# Patient Record
Sex: FEMALE | Race: WHITE | NOT HISPANIC OR LATINO | Employment: OTHER | URBAN - METROPOLITAN AREA
[De-identification: names, ages, dates, MRNs, and addresses within clinical notes are randomized per-mention and may not be internally consistent; named-entity substitution may affect disease eponyms.]

---

## 2018-01-01 ENCOUNTER — LAB REQUISITION (OUTPATIENT)
Dept: LAB | Facility: HOSPITAL | Age: 83
End: 2018-01-01
Payer: MEDICARE

## 2018-01-01 DIAGNOSIS — D48.9 NEOPLASM OF UNCERTAIN BEHAVIOR: ICD-10-CM

## 2018-01-01 PROCEDURE — 88305 TISSUE EXAM BY PATHOLOGIST: CPT | Performed by: PATHOLOGY

## 2018-04-30 ENCOUNTER — APPOINTMENT (EMERGENCY)
Dept: RADIOLOGY | Facility: HOSPITAL | Age: 83
DRG: 189 | End: 2018-04-30
Payer: MEDICARE

## 2018-04-30 ENCOUNTER — HOSPITAL ENCOUNTER (INPATIENT)
Facility: HOSPITAL | Age: 83
LOS: 8 days | Discharge: NURSING, REHAB OR LONG-TERM CARE | DRG: 189 | End: 2018-05-08
Attending: EMERGENCY MEDICINE | Admitting: FAMILY MEDICINE
Payer: MEDICARE

## 2018-04-30 DIAGNOSIS — R09.02 HYPOXIA: Primary | ICD-10-CM

## 2018-04-30 DIAGNOSIS — J45.909 ASTHMATIC BRONCHITIS: ICD-10-CM

## 2018-04-30 DIAGNOSIS — J98.01 ACUTE BRONCHOSPASM DUE TO VIRAL INFECTION: ICD-10-CM

## 2018-04-30 DIAGNOSIS — B34.9 ACUTE BRONCHOSPASM DUE TO VIRAL INFECTION: ICD-10-CM

## 2018-04-30 PROBLEM — I87.2 VENOUS INSUFFICIENCY OF BOTH LOWER EXTREMITIES: Status: ACTIVE | Noted: 2018-04-30

## 2018-04-30 PROBLEM — G25.81 RESTLESS LEG: Status: ACTIVE | Noted: 2018-04-30

## 2018-04-30 PROBLEM — M41.9 SCOLIOSIS: Status: ACTIVE | Noted: 2018-04-30

## 2018-04-30 PROBLEM — G25.0 BENIGN ESSENTIAL TREMOR: Status: ACTIVE | Noted: 2018-04-30

## 2018-04-30 PROBLEM — E03.9 HYPOTHYROID: Status: ACTIVE | Noted: 2018-04-30

## 2018-04-30 LAB
ALBUMIN SERPL BCP-MCNC: 3.2 G/DL (ref 3.5–5)
ALP SERPL-CCNC: 82 U/L (ref 46–116)
ALT SERPL W P-5'-P-CCNC: 31 U/L (ref 12–78)
ANION GAP SERPL CALCULATED.3IONS-SCNC: 3 MMOL/L (ref 4–13)
APTT PPP: 30 SECONDS (ref 23–35)
AST SERPL W P-5'-P-CCNC: 35 U/L (ref 5–45)
BASOPHILS # BLD AUTO: 0.03 THOUSANDS/ΜL (ref 0–0.1)
BASOPHILS NFR BLD AUTO: 0 % (ref 0–1)
BILIRUB DIRECT SERPL-MCNC: 0.04 MG/DL (ref 0–0.2)
BILIRUB SERPL-MCNC: 0.3 MG/DL (ref 0.2–1)
BUN SERPL-MCNC: 16 MG/DL (ref 5–25)
CALCIUM SERPL-MCNC: 8.7 MG/DL (ref 8.3–10.1)
CHLORIDE SERPL-SCNC: 96 MMOL/L (ref 100–108)
CO2 SERPL-SCNC: 35 MMOL/L (ref 21–32)
CREAT SERPL-MCNC: 0.47 MG/DL (ref 0.6–1.3)
EOSINOPHIL # BLD AUTO: 0.06 THOUSAND/ΜL (ref 0–0.61)
EOSINOPHIL NFR BLD AUTO: 1 % (ref 0–6)
ERYTHROCYTE [DISTWIDTH] IN BLOOD BY AUTOMATED COUNT: 14.2 % (ref 11.6–15.1)
GFR SERPL CREATININE-BSD FRML MDRD: 87 ML/MIN/1.73SQ M
GLUCOSE SERPL-MCNC: 116 MG/DL (ref 65–140)
HCT VFR BLD AUTO: 43.3 % (ref 34.8–46.1)
HGB BLD-MCNC: 13.6 G/DL (ref 11.5–15.4)
INR PPP: 0.94 (ref 0.86–1.16)
LYMPHOCYTES # BLD AUTO: 1.89 THOUSANDS/ΜL (ref 0.6–4.47)
LYMPHOCYTES NFR BLD AUTO: 22 % (ref 14–44)
MCH RBC QN AUTO: 29.9 PG (ref 26.8–34.3)
MCHC RBC AUTO-ENTMCNC: 31.4 G/DL (ref 31.4–37.4)
MCV RBC AUTO: 95 FL (ref 82–98)
MONOCYTES # BLD AUTO: 1.06 THOUSAND/ΜL (ref 0.17–1.22)
MONOCYTES NFR BLD AUTO: 13 % (ref 4–12)
NEUTROPHILS # BLD AUTO: 5.42 THOUSANDS/ΜL (ref 1.85–7.62)
NEUTS SEG NFR BLD AUTO: 64 % (ref 43–75)
NT-PROBNP SERPL-MCNC: 707 PG/ML
PLATELET # BLD AUTO: 268 THOUSANDS/UL (ref 149–390)
PMV BLD AUTO: 9.9 FL (ref 8.9–12.7)
POTASSIUM SERPL-SCNC: 4.1 MMOL/L (ref 3.5–5.3)
PROT SERPL-MCNC: 7.6 G/DL (ref 6.4–8.2)
PROTHROMBIN TIME: 12.8 SECONDS (ref 12.1–14.4)
RBC # BLD AUTO: 4.55 MILLION/UL (ref 3.81–5.12)
SODIUM SERPL-SCNC: 134 MMOL/L (ref 136–145)
TROPONIN I SERPL-MCNC: 0.04 NG/ML
WBC # BLD AUTO: 8.46 THOUSAND/UL (ref 4.31–10.16)

## 2018-04-30 PROCEDURE — 87205 SMEAR GRAM STAIN: CPT | Performed by: INTERNAL MEDICINE

## 2018-04-30 PROCEDURE — 94640 AIRWAY INHALATION TREATMENT: CPT

## 2018-04-30 PROCEDURE — 99285 EMERGENCY DEPT VISIT HI MDM: CPT

## 2018-04-30 PROCEDURE — 93005 ELECTROCARDIOGRAM TRACING: CPT

## 2018-04-30 PROCEDURE — 85610 PROTHROMBIN TIME: CPT | Performed by: EMERGENCY MEDICINE

## 2018-04-30 PROCEDURE — 87070 CULTURE OTHR SPECIMN AEROBIC: CPT | Performed by: INTERNAL MEDICINE

## 2018-04-30 PROCEDURE — 94664 DEMO&/EVAL PT USE INHALER: CPT

## 2018-04-30 PROCEDURE — 71046 X-RAY EXAM CHEST 2 VIEWS: CPT

## 2018-04-30 PROCEDURE — 94760 N-INVAS EAR/PLS OXIMETRY 1: CPT

## 2018-04-30 PROCEDURE — 80076 HEPATIC FUNCTION PANEL: CPT | Performed by: EMERGENCY MEDICINE

## 2018-04-30 PROCEDURE — 85730 THROMBOPLASTIN TIME PARTIAL: CPT | Performed by: EMERGENCY MEDICINE

## 2018-04-30 PROCEDURE — 85025 COMPLETE CBC W/AUTO DIFF WBC: CPT | Performed by: EMERGENCY MEDICINE

## 2018-04-30 PROCEDURE — 99223 1ST HOSP IP/OBS HIGH 75: CPT | Performed by: INTERNAL MEDICINE

## 2018-04-30 PROCEDURE — 84484 ASSAY OF TROPONIN QUANT: CPT | Performed by: EMERGENCY MEDICINE

## 2018-04-30 PROCEDURE — 87040 BLOOD CULTURE FOR BACTERIA: CPT | Performed by: EMERGENCY MEDICINE

## 2018-04-30 PROCEDURE — 80048 BASIC METABOLIC PNL TOTAL CA: CPT | Performed by: EMERGENCY MEDICINE

## 2018-04-30 PROCEDURE — 36415 COLL VENOUS BLD VENIPUNCTURE: CPT | Performed by: EMERGENCY MEDICINE

## 2018-04-30 PROCEDURE — 83880 ASSAY OF NATRIURETIC PEPTIDE: CPT | Performed by: EMERGENCY MEDICINE

## 2018-04-30 PROCEDURE — 96374 THER/PROPH/DIAG INJ IV PUSH: CPT

## 2018-04-30 RX ORDER — BENZONATATE 100 MG/1
100 CAPSULE ORAL 3 TIMES DAILY PRN
Status: DISCONTINUED | OUTPATIENT
Start: 2018-04-30 | End: 2018-05-02

## 2018-04-30 RX ORDER — METHYLPREDNISOLONE SODIUM SUCCINATE 125 MG/2ML
125 INJECTION, POWDER, LYOPHILIZED, FOR SOLUTION INTRAMUSCULAR; INTRAVENOUS ONCE
Status: COMPLETED | OUTPATIENT
Start: 2018-04-30 | End: 2018-04-30

## 2018-04-30 RX ORDER — TRAMADOL HYDROCHLORIDE 50 MG/1
50 TABLET ORAL 3 TIMES DAILY
COMMUNITY

## 2018-04-30 RX ORDER — LEVOTHYROXINE SODIUM 0.07 MG/1
75 TABLET ORAL
Status: DISCONTINUED | OUTPATIENT
Start: 2018-05-01 | End: 2018-05-08 | Stop reason: HOSPADM

## 2018-04-30 RX ORDER — DOCUSATE SODIUM 100 MG/1
100 CAPSULE, LIQUID FILLED ORAL 2 TIMES DAILY
Status: DISCONTINUED | OUTPATIENT
Start: 2018-04-30 | End: 2018-05-08 | Stop reason: HOSPADM

## 2018-04-30 RX ORDER — PRIMIDONE 50 MG/1
50 TABLET ORAL EVERY 12 HOURS SCHEDULED
Status: DISCONTINUED | OUTPATIENT
Start: 2018-04-30 | End: 2018-05-08 | Stop reason: HOSPADM

## 2018-04-30 RX ORDER — TRAMADOL HYDROCHLORIDE 50 MG/1
50 TABLET ORAL 3 TIMES DAILY
Status: DISCONTINUED | OUTPATIENT
Start: 2018-04-30 | End: 2018-05-08 | Stop reason: HOSPADM

## 2018-04-30 RX ORDER — LEVALBUTEROL 1.25 MG/.5ML
1.25 SOLUTION, CONCENTRATE RESPIRATORY (INHALATION)
Status: DISCONTINUED | OUTPATIENT
Start: 2018-04-30 | End: 2018-04-30

## 2018-04-30 RX ORDER — SODIUM CHLORIDE FOR INHALATION 0.9 %
3 VIAL, NEBULIZER (ML) INHALATION
Status: DISCONTINUED | OUTPATIENT
Start: 2018-04-30 | End: 2018-05-02

## 2018-04-30 RX ORDER — PRIMIDONE 50 MG/1
TABLET ORAL 2 TIMES DAILY
COMMUNITY

## 2018-04-30 RX ORDER — LEVOTHYROXINE SODIUM 0.07 MG/1
75 TABLET ORAL DAILY
COMMUNITY

## 2018-04-30 RX ORDER — LEVALBUTEROL 1.25 MG/.5ML
1.25 SOLUTION, CONCENTRATE RESPIRATORY (INHALATION) EVERY 4 HOURS PRN
Status: DISCONTINUED | OUTPATIENT
Start: 2018-04-30 | End: 2018-04-30

## 2018-04-30 RX ORDER — GUAIFENESIN 600 MG
600 TABLET, EXTENDED RELEASE 12 HR ORAL EVERY 12 HOURS SCHEDULED
Status: DISCONTINUED | OUTPATIENT
Start: 2018-04-30 | End: 2018-05-02

## 2018-04-30 RX ORDER — FUROSEMIDE 40 MG/1
40 TABLET ORAL 2 TIMES DAILY
COMMUNITY
End: 2019-01-01

## 2018-04-30 RX ORDER — CEFPODOXIME PROXETIL 100 MG/1
100 TABLET, FILM COATED ORAL 2 TIMES DAILY
COMMUNITY
End: 2019-01-01

## 2018-04-30 RX ORDER — FUROSEMIDE 40 MG/1
40 TABLET ORAL 2 TIMES DAILY
Status: DISCONTINUED | OUTPATIENT
Start: 2018-04-30 | End: 2018-05-08 | Stop reason: HOSPADM

## 2018-04-30 RX ORDER — BENZONATATE 100 MG/1
100 CAPSULE ORAL 3 TIMES DAILY PRN
COMMUNITY

## 2018-04-30 RX ORDER — PRAMIPEXOLE DIHYDROCHLORIDE 0.12 MG/1
0.12 TABLET ORAL 3 TIMES DAILY
COMMUNITY

## 2018-04-30 RX ORDER — LEVALBUTEROL 1.25 MG/.5ML
1.25 SOLUTION, CONCENTRATE RESPIRATORY (INHALATION)
Status: DISCONTINUED | OUTPATIENT
Start: 2018-04-30 | End: 2018-05-08 | Stop reason: HOSPADM

## 2018-04-30 RX ORDER — PRAMIPEXOLE DIHYDROCHLORIDE 0.25 MG/1
0.12 TABLET ORAL 3 TIMES DAILY
Status: DISCONTINUED | OUTPATIENT
Start: 2018-04-30 | End: 2018-05-08 | Stop reason: HOSPADM

## 2018-04-30 RX ORDER — DOCUSATE SODIUM 100 MG/1
100 CAPSULE, LIQUID FILLED ORAL 2 TIMES DAILY
COMMUNITY
End: 2019-01-01

## 2018-04-30 RX ORDER — METHYLPREDNISOLONE SODIUM SUCCINATE 40 MG/ML
40 INJECTION, POWDER, LYOPHILIZED, FOR SOLUTION INTRAMUSCULAR; INTRAVENOUS EVERY 12 HOURS SCHEDULED
Status: DISCONTINUED | OUTPATIENT
Start: 2018-04-30 | End: 2018-05-02

## 2018-04-30 RX ORDER — DOXYCYCLINE HYCLATE 100 MG/1
100 CAPSULE ORAL EVERY 12 HOURS SCHEDULED
Status: DISCONTINUED | OUTPATIENT
Start: 2018-04-30 | End: 2018-05-06

## 2018-04-30 RX ORDER — SACCHAROMYCES BOULARDII 250 MG
250 CAPSULE ORAL 2 TIMES DAILY
Status: DISCONTINUED | OUTPATIENT
Start: 2018-04-30 | End: 2018-05-08 | Stop reason: HOSPADM

## 2018-04-30 RX ORDER — L.RHAMNOSUS/B.ANIMALIS(LACTIS) 3B CELL
CAPSULE ORAL
COMMUNITY

## 2018-04-30 RX ORDER — ALBUTEROL SULFATE 2.5 MG/3ML
2.5 SOLUTION RESPIRATORY (INHALATION) EVERY 6 HOURS PRN
Status: DISCONTINUED | OUTPATIENT
Start: 2018-04-30 | End: 2018-05-02

## 2018-04-30 RX ORDER — RIBOFLAVIN (VITAMIN B2) 100 MG
100 TABLET ORAL DAILY
COMMUNITY

## 2018-04-30 RX ADMIN — METHYLPREDNISOLONE SODIUM SUCCINATE 40 MG: 40 INJECTION, POWDER, FOR SOLUTION INTRAMUSCULAR; INTRAVENOUS at 20:39

## 2018-04-30 RX ADMIN — GUAIFENESIN 600 MG: 600 TABLET, EXTENDED RELEASE ORAL at 20:39

## 2018-04-30 RX ADMIN — DOCUSATE SODIUM 100 MG: 100 CAPSULE, LIQUID FILLED ORAL at 19:40

## 2018-04-30 RX ADMIN — METHYLPREDNISOLONE SODIUM SUCCINATE 125 MG: 125 INJECTION, POWDER, FOR SOLUTION INTRAMUSCULAR; INTRAVENOUS at 16:29

## 2018-04-30 RX ADMIN — DOXYCYCLINE 100 MG: 100 CAPSULE ORAL at 20:39

## 2018-04-30 RX ADMIN — IPRATROPIUM BROMIDE 0.5 MG: 0.5 SOLUTION RESPIRATORY (INHALATION) at 16:29

## 2018-04-30 RX ADMIN — PRIMIDONE 50 MG: 50 TABLET ORAL at 20:40

## 2018-04-30 RX ADMIN — BENZONATATE 100 MG: 100 CAPSULE ORAL at 21:15

## 2018-04-30 RX ADMIN — Medication 250 MG: at 19:40

## 2018-04-30 RX ADMIN — PRAMIPEXOLE DIHYDROCHLORIDE 0.12 MG: 0.25 TABLET ORAL at 20:40

## 2018-04-30 RX ADMIN — ISODIUM CHLORIDE 3 ML: 0.03 SOLUTION RESPIRATORY (INHALATION) at 20:48

## 2018-04-30 RX ADMIN — ALBUTEROL SULFATE 5 MG: 2.5 SOLUTION RESPIRATORY (INHALATION) at 16:28

## 2018-04-30 RX ADMIN — LEVALBUTEROL HYDROCHLORIDE 1.25 MG: 1.25 SOLUTION, CONCENTRATE RESPIRATORY (INHALATION) at 20:48

## 2018-04-30 RX ADMIN — FUROSEMIDE 40 MG: 40 TABLET ORAL at 19:39

## 2018-04-30 NOTE — ED PROVIDER NOTES
History  Chief Complaint   Patient presents with    Shortness of Breath     Pt  started with cough on Friday, had cxr was wnl  Pt  reports coughing and congestion getting worse, pox 70% on room air  Pox on 6 liters 99%       History provided by:  Patient and EMS personnel  Shortness of Breath   Severity:  Severe  Onset quality:  Gradual  Duration:  2 days  Timing:  Intermittent  Progression:  Waxing and waning  Chronicity:  New  Context comment:  Patient with O2 saturation of 70 percent per paramedics 80 percent by arrival here patient has been having a cough over the past few days and feeling ill  Relieved by: Pre-hospital duo nebs  Worsened by: Activity  Associated symptoms: cough and wheezing    Associated symptoms: no abdominal pain, no chest pain, no diaphoresis, no fever, no headaches, no neck pain, no rash, no sore throat, no sputum production and no vomiting        None       Past Medical History:   Diagnosis Date    Arthritis     Cellulitis     Disease of thyroid gland     Edema     lower ext   Scoliosis        Past Surgical History:   Procedure Laterality Date    HYSTERECTOMY         History reviewed  No pertinent family history  I have reviewed and agree with the history as documented  Social History   Substance Use Topics    Smoking status: Former Smoker    Smokeless tobacco: Never Used    Alcohol use 0 6 oz/week     1 Glasses of wine per week      Comment: social        Review of Systems   Constitutional: Negative for activity change, chills, diaphoresis and fever  HENT: Negative for congestion, sinus pressure and sore throat  Eyes: Negative for pain and visual disturbance  Respiratory: Positive for cough, shortness of breath and wheezing  Negative for sputum production, chest tightness and stridor  Cardiovascular: Negative for chest pain and palpitations  Gastrointestinal: Negative for abdominal distention, abdominal pain, constipation, diarrhea, nausea and vomiting  Genitourinary: Negative for dysuria and frequency  Musculoskeletal: Negative for neck pain and neck stiffness  Skin: Negative for rash  Neurological: Negative for dizziness, speech difficulty, light-headedness, numbness and headaches  Physical Exam  ED Triage Vitals   Temperature Pulse Respirations Blood Pressure SpO2   04/30/18 1506 04/30/18 1506 04/30/18 1506 04/30/18 1510 04/30/18 1506   98 9 °F (37 2 °C) (!) 109 18 108/54 98 %      Temp Source Heart Rate Source Patient Position - Orthostatic VS BP Location FiO2 (%)   04/30/18 1506 04/30/18 1506 04/30/18 1510 04/30/18 1510 --   Oral Monitor Lying Right arm       Pain Score       04/30/18 1506       No Pain           Orthostatic Vital Signs  Vitals:    04/30/18 1506 04/30/18 1510 04/30/18 1627   BP:  108/54 (!) 114/49   Pulse: (!) 109  98   Patient Position - Orthostatic VS:  Lying Lying       Physical Exam   Constitutional: She is oriented to person, place, and time  She appears well-developed  She appears distressed  HENT:   Head: Normocephalic and atraumatic  Eyes: Pupils are equal, round, and reactive to light  Neck: Normal range of motion  Neck supple  No tracheal deviation present  Cardiovascular: Normal rate, regular rhythm, normal heart sounds and intact distal pulses  No murmur heard  Pulmonary/Chest: No stridor  She is in respiratory distress  She has no decreased breath sounds  She has wheezes  She has rhonchi  She has no rales  Abdominal: Soft  She exhibits no distension  There is no tenderness  There is no rebound and no guarding  Musculoskeletal: Normal range of motion  Neurological: She is alert and oriented to person, place, and time  Skin: Skin is warm and dry  She is not diaphoretic  No erythema  No pallor  Psychiatric: She has a normal mood and affect  Vitals reviewed        ED Medications  Medications    EMS REPLENISHMENT MED ( Does not apply Given to EMS 4/30/18 1510)   ipratropium (ATROVENT) 0 02 % inhalation solution 0 5 mg (0 5 mg Nebulization Given 4/30/18 1629)   albuterol inhalation solution 5 mg (5 mg Nebulization Given 4/30/18 1628)   methylPREDNISolone sodium succinate (Solu-MEDROL) injection 125 mg (125 mg Intravenous Given 4/30/18 1629)       Diagnostic Studies  Results Reviewed     Procedure Component Value Units Date/Time    Blood culture #1 [[id number]] Collected:  04/30/18 1626    Lab Status: In process Specimen:  Blood from Arm, Right Updated:  04/30/18 3474    Basic metabolic panel [[id number]]  (Abnormal) Collected:  04/30/18 1535    Lab Status:  Final result Specimen:  Blood from Arm, Right Updated:  04/30/18 1611     Sodium 134 (L) mmol/L      Potassium 4 1 mmol/L      Chloride 96 (L) mmol/L      CO2 35 (H) mmol/L      Anion Gap 3 (L) mmol/L      BUN 16 mg/dL      Creatinine 0 47 (L) mg/dL      Glucose 116 mg/dL      Calcium 8 7 mg/dL      eGFR 87 ml/min/1 73sq m     Narrative:         National Kidney Disease Education Program recommendations are as follows:  GFR calculation is accurate only with a steady state creatinine  Chronic Kidney disease less than 60 ml/min/1 73 sq  meters  Kidney failure less than 15 ml/min/1 73 sq  meters      Hepatic function panel [[id number]]  (Abnormal) Collected:  04/30/18 1535    Lab Status:  Final result Specimen:  Blood from Arm, Right Updated:  04/30/18 1611     Total Bilirubin 0 30 mg/dL      Bilirubin, Direct 0 04 mg/dL      Alkaline Phosphatase 82 U/L      AST 35 U/L      ALT 31 U/L      Total Protein 7 6 g/dL      Albumin 3 2 (L) g/dL     B-type natriuretic peptide [[id number]]  (Abnormal) Collected:  04/30/18 1535    Lab Status:  Final result Specimen:  Blood from Arm, Right Updated:  04/30/18 1611     NT-proBNP 707 (H) pg/mL     Troponin I [[id number]]  (Normal) Collected:  04/30/18 1535    Lab Status:  Final result Specimen:  Blood from Arm, Right Updated:  04/30/18 1604     Troponin I 0 04 ng/mL     Narrative:         [employer] Chemistry analyzer 99% cutoff is > 0 04 ng/mL in network labs    o cTnI 99% cutoff is useful only when applied to patients in the clinical setting of myocardial ischemia  o cTnI 99% cutoff should be interpreted in the context of clinical history, ECG findings and possibly cardiac imaging to establish correct diagnosis  o cTnI 99% cutoff may be suggestive but clearly not indicative of a coronary event without the clinical setting of myocardial ischemia  Protime-INR [[id number]]  (Normal) Collected:  04/30/18 1535    Lab Status:  Final result Specimen:  Blood from Arm, Right Updated:  04/30/18 1559     Protime 12 8 seconds      INR 0 94    APTT [[id number]]  (Normal) Collected:  04/30/18 1535    Lab Status:  Final result Specimen:  Blood from Arm, Right Updated:  04/30/18 1559     PTT 30 seconds     CBC and differential [[id number]]  (Abnormal) Collected:  04/30/18 1535    Lab Status:  Final result Specimen:  Blood from Arm, Right Updated:  04/30/18 1546     WBC 8 46 Thousand/uL      RBC 4 55 Million/uL      Hemoglobin 13 6 g/dL      Hematocrit 43 3 %      MCV 95 fL      MCH 29 9 pg      MCHC 31 4 g/dL      RDW 14 2 %      MPV 9 9 fL      Platelets 424 Thousands/uL      Neutrophils Relative 64 %      Lymphocytes Relative 22 %      Monocytes Relative 13 (H) %      Eosinophils Relative 1 %      Basophils Relative 0 %      Neutrophils Absolute 5 42 Thousands/µL      Lymphocytes Absolute 1 89 Thousands/µL      Monocytes Absolute 1 06 Thousand/µL      Eosinophils Absolute 0 06 Thousand/µL      Basophils Absolute 0 03 Thousands/µL     Blood culture #2 [[id number]] Collected:  04/30/18 1535    Lab Status: In process Specimen:  Blood from Arm, Right Updated:  04/30/18 1539                 XR chest 2 views   Final Result by Sawyer Olivares MD (04/30 1636)      No significant infiltrate or pleural effusion identified              Workstation performed: [id number]                    Procedures  Procedures       Phone Contacts  ED Phone Contact    ED Course  ED Course as of Apr 30 1701 Mon Apr 30, 2018   1605  No evidence of pneumonia on x-ray patient afebrile, no leukocytosis, will treat as a bronchospasm, will add Solu-Medrol, will continue  albuterol                                MDM  Number of Diagnoses or Management Options  Acute bronchospasm due to viral infection: new and requires workup  Hypoxia: new and requires workup  Diagnosis management comments:       Initial ED assessment:   69-year-old female brought in hypoxic in respiratory distress, cough productive of sputum    Initial DDx includes but is not limited to:    Pneumonia, bronchitis, congestive heart failure, bronchospasm, no formal diagnosis of COPD, but patient former smoker, possible COPD exacerbation    Initial ED plan:    Blood work, chest x-ray, nasal cannula oxygen due to hypoxia, patient will require admission to hospital        Final ED summary/disposition:   After evaluation and workup in the emergency department,  Hypoxia improved with nasal cannula oxygen, no evidence of pneumonia will admit for continued breathing treatments        Amount and/or Complexity of Data Reviewed  Clinical lab tests: reviewed and ordered  Tests in the radiology section of CPT®: ordered and reviewed  Decide to obtain previous medical records or to obtain history from someone other than the patient: yes  Obtain history from someone other than the patient: yes  Review and summarize past medical records: yes  Discuss the patient with other providers: yes  Independent visualization of images, tracings, or specimens: yes      The patient presented with a condition in which there was a high probability of imminent or life-threatening deterioration, and critical care services (excluding separately billable procedures) totalled 30-74 minutes          Disposition  Final diagnoses:   Hypoxia   Acute bronchospasm due to viral infection     Time reflects when diagnosis was documented in both MDM as applicable and the Disposition within this note     Time User Action Codes Description Comment    4/30/2018  4:59 PM Oly Torres [R09 02] Hypoxia     4/30/2018  4:59 PM Govindirina Al, [facility] [J98 01, J34 9] Acute bronchospasm due to viral infection       ED Disposition     ED Disposition Condition Comment    Admit  Case was discussed with DR Julienne Lerner  and the patient's admission status was agreed to be Admission Status: inpatient status to the service of Dr Julienne Lerner   Follow-up Information    None       Patient's Medications    No medications on file     No discharge procedures on file      ED Provider  Electronically Signed by           Sandy Morales DO  04/30/18 3735

## 2018-04-30 NOTE — ED NOTES
Dr Dorado Spore decreased oxygen to 4 liters nc   Pox 95%     Mayco Plummer, RN  04/30/18 151 Tawny Rios, POLLY  04/30/18 2330

## 2018-04-30 NOTE — H&P
H&P- Chiquita Oconnor 9/14/1926, 80 y o  female MRN: [id number]    Unit/Bed#: -01 Encounter: [id number]    Primary Care Provider: Carletha Lennox, DO   Date and time admitted to hospital: 4/30/2018  2:53 PM        * Asthmatic bronchitis   Assessment & Plan    Suspect acute viral infection/bronchitis with underlying COPD,   h/o tobacco use from age [age] less then a pack/day  Admit for inpatient treatment given hypoxia on admission and failure of outpatient treatment  Change ABX to doxycycline 100mg BID  IV solumedrol 40mg Q12h, taper when breathing improves  Continue bronchodilators around the clock and PRN  Add mucinex  Check sputum culture  Outpatient PFT when condition resolves  Scoliosis   Assessment & Plan    Known diagnosis  Likely contributing to shortness of breath given its severity        Venous insufficiency of both lower extremities   Assessment & Plan    Continue home dose of lasix        Restless leg   Assessment & Plan    Continue mirapex        Hypothyroid   Assessment & Plan    Continue levothyroxine        Benign essential tremor   Assessment & Plan    Continue primidone              History of Present Illness     HPI:  Chiquita Oconnor is a 80 y o  female who presents with shortness of breath since Friday  She saw her PCP who prescribed an antibiotic and cough suppressant  She took two doses and has not improved  She was noted to be hypoxic upon arrival to ED and given bronchodilators and IV steroid with improvement  She reports white sputum when coughing  Denies nasal congestion, sore throat, body aches  Denies weight gain, edema  Denies travel or sick contacts  Denies exposure to toxins, gases, dusts, or fumes  Review of Systems   All other systems reviewed and are negative  Historical Information   Past Medical History:   Diagnosis Date    Arthritis     Cellulitis     Disease of thyroid gland     Edema     lower ext      Scoliosis      Past Surgical History: Procedure Laterality Date    HYSTERECTOMY       Social History   History   Alcohol Use    0 6 oz/week    1 Glasses of wine per week     Comment: social     History   Drug Use No     History   Smoking Status    Former Smoker   Smokeless Tobacco    Never Used     Family History: non-contributory    Meds/Allergies   all medications and allergies reviewed  Allergies   Allergen Reactions    Sulfa Antibiotics        Objective   Vitals: Blood pressure 109/55, pulse 95, temperature 99 9 °F (37 7 °C), temperature source Rectal, resp  rate 18, weight 65 4 kg (144 lb 2 9 oz), SpO2 94 %  No intake or output data in the 24 hours ending 04/30/18 1910    Invasive Devices     Peripheral Intravenous Line            Peripheral IV 04/30/18 Left Forearm less than 1 day                Physical Exam   Constitutional: She is oriented to person, place, and time  She appears well-developed and well-nourished  HENT:   Head: Normocephalic and atraumatic  Eyes: EOM are normal  Pupils are equal, round, and reactive to light  No scleral icterus  Neck: Neck supple  No JVD present  Cardiovascular: Normal rate and regular rhythm  Pulmonary/Chest: She has wheezes  She has no rales  Abdominal: Soft  She exhibits no distension  There is no tenderness  There is no rebound  Musculoskeletal: She exhibits deformity  She exhibits no edema  Neurological: She is alert and oriented to person, place, and time  Skin: Skin is warm and dry  Lab Results: I have personally reviewed pertinent reports  Imaging: I have personally reviewed pertinent films in PACS  EKG, Pathology, and Other Studies: I have personally reviewed pertinent reports  Code Status: Level 1 - Full Code  Advance Directive and Living Will:      Power of :    POLST:      Counseling / Coordination of Care  Total floor / unit time spent today 45 minutes    Greater than 50% of total time was spent with the patient and / or family counseling and / or coordination of care  A description of the counseling / coordination of care: discussed plan of care with the patient at the bedside

## 2018-04-30 NOTE — ASSESSMENT & PLAN NOTE
Suspect acute viral infection/bronchitis with underlying COPD,   h/o tobacco use from age [age] less then a pack/day  Admit for inpatient treatment given hypoxia on admission and failure of outpatient treatment  Change ABX to doxycycline 100mg BID  IV solumedrol 40mg Q12h, taper when breathing improves  Continue bronchodilators around the clock and PRN  Add mucinex  Check sputum culture  Outpatient PFT when condition resolves

## 2018-05-01 PROBLEM — J96.01 ACUTE RESPIRATORY FAILURE WITH HYPOXIA (HCC): Status: ACTIVE | Noted: 2018-05-01

## 2018-05-01 LAB
ANION GAP SERPL CALCULATED.3IONS-SCNC: 3 MMOL/L (ref 4–13)
ATRIAL RATE: 115 BPM
ATRIAL RATE: 116 BPM
BASOPHILS # BLD AUTO: 0.01 THOUSANDS/ΜL (ref 0–0.1)
BASOPHILS NFR BLD AUTO: 0 % (ref 0–1)
BUN SERPL-MCNC: 16 MG/DL (ref 5–25)
CALCIUM SERPL-MCNC: 8.8 MG/DL (ref 8.3–10.1)
CHLORIDE SERPL-SCNC: 97 MMOL/L (ref 100–108)
CO2 SERPL-SCNC: 37 MMOL/L (ref 21–32)
CREAT SERPL-MCNC: 0.53 MG/DL (ref 0.6–1.3)
EOSINOPHIL # BLD AUTO: 0.01 THOUSAND/ΜL (ref 0–0.61)
EOSINOPHIL NFR BLD AUTO: 0 % (ref 0–6)
ERYTHROCYTE [DISTWIDTH] IN BLOOD BY AUTOMATED COUNT: 14.2 % (ref 11.6–15.1)
GFR SERPL CREATININE-BSD FRML MDRD: 83 ML/MIN/1.73SQ M
GLUCOSE SERPL-MCNC: 125 MG/DL (ref 65–140)
HCT VFR BLD AUTO: 41.5 % (ref 34.8–46.1)
HGB BLD-MCNC: 13.1 G/DL (ref 11.5–15.4)
LYMPHOCYTES # BLD AUTO: 0.98 THOUSANDS/ΜL (ref 0.6–4.47)
LYMPHOCYTES NFR BLD AUTO: 15 % (ref 14–44)
MCH RBC QN AUTO: 30.5 PG (ref 26.8–34.3)
MCHC RBC AUTO-ENTMCNC: 31.6 G/DL (ref 31.4–37.4)
MCV RBC AUTO: 97 FL (ref 82–98)
MONOCYTES # BLD AUTO: 0.67 THOUSAND/ΜL (ref 0.17–1.22)
MONOCYTES NFR BLD AUTO: 10 % (ref 4–12)
NEUTROPHILS # BLD AUTO: 4.87 THOUSANDS/ΜL (ref 1.85–7.62)
NEUTS SEG NFR BLD AUTO: 75 % (ref 43–75)
P AXIS: 71 DEGREES
P AXIS: 85 DEGREES
PLATELET # BLD AUTO: 280 THOUSANDS/UL (ref 149–390)
PMV BLD AUTO: 10 FL (ref 8.9–12.7)
POTASSIUM SERPL-SCNC: 4.2 MMOL/L (ref 3.5–5.3)
PR INTERVAL: 154 MS
PR INTERVAL: 156 MS
QRS AXIS: -4 DEGREES
QRS AXIS: -6 DEGREES
QRSD INTERVAL: 74 MS
QRSD INTERVAL: 74 MS
QT INTERVAL: 300 MS
QT INTERVAL: 318 MS
QTC INTERVAL: 410 MS
QTC INTERVAL: 433 MS
RBC # BLD AUTO: 4.29 MILLION/UL (ref 3.81–5.12)
SODIUM SERPL-SCNC: 137 MMOL/L (ref 136–145)
T WAVE AXIS: -7 DEGREES
T WAVE AXIS: 3 DEGREES
VENTRICULAR RATE: 111 BPM
VENTRICULAR RATE: 112 BPM
WBC # BLD AUTO: 6.54 THOUSAND/UL (ref 4.31–10.16)

## 2018-05-01 PROCEDURE — 94760 N-INVAS EAR/PLS OXIMETRY 1: CPT

## 2018-05-01 PROCEDURE — 94640 AIRWAY INHALATION TREATMENT: CPT

## 2018-05-01 PROCEDURE — 94664 DEMO&/EVAL PT USE INHALER: CPT

## 2018-05-01 PROCEDURE — 85025 COMPLETE CBC W/AUTO DIFF WBC: CPT | Performed by: INTERNAL MEDICINE

## 2018-05-01 PROCEDURE — 94668 MNPJ CHEST WALL SBSQ: CPT

## 2018-05-01 PROCEDURE — 99232 SBSQ HOSP IP/OBS MODERATE 35: CPT | Performed by: INTERNAL MEDICINE

## 2018-05-01 PROCEDURE — 80048 BASIC METABOLIC PNL TOTAL CA: CPT | Performed by: INTERNAL MEDICINE

## 2018-05-01 PROCEDURE — 93010 ELECTROCARDIOGRAM REPORT: CPT | Performed by: INTERNAL MEDICINE

## 2018-05-01 RX ADMIN — LEVALBUTEROL HYDROCHLORIDE 1.25 MG: 1.25 SOLUTION, CONCENTRATE RESPIRATORY (INHALATION) at 08:49

## 2018-05-01 RX ADMIN — ISODIUM CHLORIDE 3 ML: 0.03 SOLUTION RESPIRATORY (INHALATION) at 01:12

## 2018-05-01 RX ADMIN — LEVALBUTEROL HYDROCHLORIDE 1.25 MG: 1.25 SOLUTION, CONCENTRATE RESPIRATORY (INHALATION) at 01:12

## 2018-05-01 RX ADMIN — Medication 250 MG: at 08:58

## 2018-05-01 RX ADMIN — Medication 250 MG: at 19:33

## 2018-05-01 RX ADMIN — PRAMIPEXOLE DIHYDROCHLORIDE 0.12 MG: 0.25 TABLET ORAL at 08:58

## 2018-05-01 RX ADMIN — METHYLPREDNISOLONE SODIUM SUCCINATE 40 MG: 40 INJECTION, POWDER, FOR SOLUTION INTRAMUSCULAR; INTRAVENOUS at 08:59

## 2018-05-01 RX ADMIN — PRAMIPEXOLE DIHYDROCHLORIDE 0.12 MG: 0.25 TABLET ORAL at 16:10

## 2018-05-01 RX ADMIN — DOCUSATE SODIUM 100 MG: 100 CAPSULE, LIQUID FILLED ORAL at 19:33

## 2018-05-01 RX ADMIN — METHYLPREDNISOLONE SODIUM SUCCINATE 40 MG: 40 INJECTION, POWDER, FOR SOLUTION INTRAMUSCULAR; INTRAVENOUS at 22:21

## 2018-05-01 RX ADMIN — PRIMIDONE 50 MG: 50 TABLET ORAL at 08:59

## 2018-05-01 RX ADMIN — ISODIUM CHLORIDE 3 ML: 0.03 SOLUTION RESPIRATORY (INHALATION) at 20:23

## 2018-05-01 RX ADMIN — GUAIFENESIN 600 MG: 600 TABLET, EXTENDED RELEASE ORAL at 22:20

## 2018-05-01 RX ADMIN — TRAMADOL HYDROCHLORIDE 50 MG: 50 TABLET ORAL at 08:59

## 2018-05-01 RX ADMIN — DOCUSATE SODIUM 100 MG: 100 CAPSULE, LIQUID FILLED ORAL at 08:59

## 2018-05-01 RX ADMIN — ISODIUM CHLORIDE 3 ML: 0.03 SOLUTION RESPIRATORY (INHALATION) at 13:36

## 2018-05-01 RX ADMIN — ISODIUM CHLORIDE 3 ML: 0.03 SOLUTION RESPIRATORY (INHALATION) at 08:49

## 2018-05-01 RX ADMIN — LEVALBUTEROL HYDROCHLORIDE 1.25 MG: 1.25 SOLUTION, CONCENTRATE RESPIRATORY (INHALATION) at 13:36

## 2018-05-01 RX ADMIN — ENOXAPARIN SODIUM 40 MG: 40 INJECTION SUBCUTANEOUS at 08:59

## 2018-05-01 RX ADMIN — DOXYCYCLINE 100 MG: 100 CAPSULE ORAL at 08:59

## 2018-05-01 RX ADMIN — DOXYCYCLINE 100 MG: 100 CAPSULE ORAL at 22:19

## 2018-05-01 RX ADMIN — BENZONATATE 100 MG: 100 CAPSULE ORAL at 05:20

## 2018-05-01 RX ADMIN — PRAMIPEXOLE DIHYDROCHLORIDE 0.12 MG: 0.25 TABLET ORAL at 22:19

## 2018-05-01 RX ADMIN — PRIMIDONE 50 MG: 50 TABLET ORAL at 22:21

## 2018-05-01 RX ADMIN — GUAIFENESIN 600 MG: 600 TABLET, EXTENDED RELEASE ORAL at 08:58

## 2018-05-01 RX ADMIN — TRAMADOL HYDROCHLORIDE 50 MG: 50 TABLET ORAL at 16:10

## 2018-05-01 RX ADMIN — LEVALBUTEROL HYDROCHLORIDE 1.25 MG: 1.25 SOLUTION, CONCENTRATE RESPIRATORY (INHALATION) at 20:23

## 2018-05-01 RX ADMIN — FUROSEMIDE 40 MG: 40 TABLET ORAL at 19:33

## 2018-05-01 RX ADMIN — LEVOTHYROXINE SODIUM 75 MCG: 75 TABLET ORAL at 05:20

## 2018-05-01 NOTE — CASE MANAGEMENT
Initial Clinical Review    Admission: Date/Time/Statement: 4/30/18 @ 1700     Orders Placed This Encounter   Procedures    Inpatient Admission (expected length of stay for this patient is greater than two midnights)     Standing Status:   Standing     Number of Occurrences:   1     Order Specific Question:   Admitting Physician     Answer:   Enoc Mc [[id number]]     Order Specific Question:   Level of Care     Answer:   Med Surg [[id number]]     Order Specific Question:   Estimated length of stay     Answer:   More than 2 Midnights     Order Specific Question:   Certification     Answer:   I certify that inpatient services are medically necessary for this patient for a duration of greater than two midnights  See H&P and MD Progress Notes for additional information about the patient's course of treatment  ED: Date/Time/Mode of Arrival:   ED Arrival Information     Expected Arrival Acuity Means of Arrival Escorted By Service Admission Type    - 4/30/2018 14:53 [facility]gent [facility] [address] Emergency    Arrival Complaint    sob          Chief Complaint:   Chief Complaint   Patient presents with    Shortness of Breath     Pt  started with cough on Friday, had cxr was wnl  Pt  reports coughing and congestion getting worse, pox 70% on room air  Pox on 6 liters 99%       History of Illness: 80 y o  female who presents with shortness of breath since Friday  She saw her PCP who prescribed an antibiotic and cough suppressant  She took two doses and has not improved  She was noted to be hypoxic upon arrival to ED and given bronchodilators and IV steroid with improvement  She reports white sputum when coughing      ED Vital Signs:   ED Triage Vitals   Temperature Pulse Respirations Blood Pressure SpO2   04/30/18 1506 04/30/18 1506 04/30/18 1506 04/30/18 1510 04/30/18 1506   98 9 °F (37 2 °C) (!) 109 18 108/54 98 %      Temp Source Heart Rate Source Patient Position - Orthostatic VS BP Location FiO2 (%) 04/30/18 1506 04/30/18 1506 04/30/18 1510 04/30/18 1510 --   Oral Monitor Lying Right arm       Pain Score       04/30/18 1506       No Pain        Wt Readings from Last 1 Encounters:   04/30/18 65 4 kg (144 lb 2 9 oz)       Vital Signs (abnormal):  Low sat 85%  Maximum pulse 109  Maximum respiratory rate 25  Exam respiratory distress  Wheezes  Rhonchi  Abnormal Labs/Diagnostic Test Results:   Na 134  Cl 96  CO2-35  Anion gap 3  Bun 16  Creatinine 0 47  Albumin 3 2  NT-proBNP 707    Labs 5/1- cl 97  CO2-37  Anion gap 3  Bun 16  Creatinine 0 53  ED Treatment:   Medication Administration from 04/30/2018 1453 to 04/30/2018 1820       Date/Time Order Dose Route Action Action by Comments     04/30/2018 1510  EMS REPLENISHMENT MED 0  Does not apply Given to EMS Marielos Silverio RN      04/30/2018 1629 ipratropium (ATROVENT) 0 02 % inhalation solution 0 5 mg 0 5 mg Nebulization Given Hansel Calderon RN      04/30/2018 1628 albuterol inhalation solution 5 mg 5 mg Nebulization Given Hansel Calderon RN      04/30/2018 1629 methylPREDNISolone sodium succinate (Solu-MEDROL) injection 125 mg 125 mg Intravenous Given Hansle Calderon RN           Past Medical/Surgical History:    Active Ambulatory Problems     Diagnosis Date Noted    No Active Ambulatory Problems     Resolved Ambulatory Problems     Diagnosis Date Noted    No Resolved Ambulatory Problems     Past Medical History:   Diagnosis Date    Arthritis     Cellulitis     Disease of thyroid gland     Edema     Scoliosis        Admitting Diagnosis: Shortness of breath [R06 02]  Hypoxia [R09 02]  Acute bronchospasm due to viral infection [J98 01, B34 9]    Age/Sex: 80 y o  female    Assessment/Plan:   Asthmatic bronchitis   Assessment & Plan     Suspect acute viral infection/bronchitis with underlying COPD,   h/o tobacco use from age [age] less then a pack/day  Admit for inpatient treatment given hypoxia on admission and failure of outpatient treatment  Change ABX to doxycycline 100mg BID  IV solumedrol 40mg Q12h, taper when breathing improves  Continue bronchodilators around the clock and PRN  Add mucinex  Check sputum culture  Outpatient PFT when condition resolves           Scoliosis   Assessment & Plan     Known diagnosis  Likely contributing to shortness of breath given its severity          Venous insufficiency of both lower extremities   Assessment & Plan     Continue home dose of lasix          Restless leg   Assessment & Plan     Continue mirapex          Hypothyroid   Assessment & Plan     Continue levothyroxine          Benign essential tremor   Assessment & Plan     Continue primidone         Admission Orders: 4/30/2018  1701 INPATIENT   Scheduled Meds:   Current Facility-Administered Medications:  albuterol 2 5 mg Nebulization Q6H PRN Cipriano Fuentes MD   benzonatate 100 mg Oral TID PRN Charlene Aguirre MD   docusate sodium 100 mg Oral BID Charlene Aguirre MD   doxycycline hyclate 100 mg Oral Q12H [address] Charlene Aguirre MD   enoxaparin 40 mg Subcutaneous Daily Charlene Aguirre MD   furosemide 40 mg Oral BID Charlene Aguirre MD   guaiFENesin 600 mg Oral Q12H [address] Charlene Aguirre MD   levalbuterol 1 25 mg Nebulization Q6H Cipriano Fuentes MD   levothyroxine 75 mcg Oral Early Morning Charlene Aguirre MD   methylPREDNISolone sodium succinate 40 mg Intravenous Q12H Lazarus Glenn, MD   pramipexole 0 125 mg Oral TID Charlene Aguirre MD   primidone 50 mg Oral Q12H Lazarus Glenn, MD   saccharomyces boulardii 250 mg Oral BID Charlene Aguirre MD   sodium chloride 3 mL Nebulization Q6H Cipriano Fuentes MD   traMADol 50 mg Oral TID Charlene Aguirre MD     Continuous Infusions:    PRN Meds:   albuterol    Benzonatate - used x 2    Pulse oximetry with ambulation  scds  Respiratory protocol - oxygen 4 liters     Oscillatory positive expiratory pressure tid

## 2018-05-01 NOTE — PLAN OF CARE
Problem: DISCHARGE PLANNING - CARE MANAGEMENT  Goal: Discharge to post-acute care or home with appropriate resources  INTERVENTIONS:  - Conduct assessment to determine patient/family and health care team treatment goals, and need for post-acute services based on payer coverage, community resources, and patient preferences, and barriers to discharge  - Address psychosocial, clinical, and financial barriers to discharge as identified in assessment in conjunction with the patient/family and health care team  - Arrange appropriate level of post-acute services according to patients   needs and preference and payer coverage in collaboration with the physician and health care team  - Communicate with and update the patient/family, physician, and health care team regarding progress on the discharge plan  - Arrange appropriate transportation to post-acute venues  Outcome: Progressing  LOS: 1 day, Bundle Patient: No, 30 day Readmission: No    CM met with pt at bedside  Pt is alert, oriented  Pt states she lives in [facility]  Pt states she uses a walker and also has a WC  Pt needs assistance with bathing and some help with dressing herself  Pt stated she has had a VN in past but does not recall the name of the agency  She stated she was in a STR long ago  Pt's medications are provide by [facility]  Pt denies hx of alcohol, drug or MH  Pt's health care rep is her daughter, Em Fox  Pt stated she is retired and collect SS benefits and that she no longer drives  CM name and role reviewed and Discharge Checklist provided  Encouraged patient and caregiver to review prior to discharge        CM reviewed discharge planning process including the following: identifying caregivers at home, preference for d/c planning needs, [facility] Meds to Bed program, availability of treatment team to discuss questions or concerns patient and/or family may have regarding diagnosis, plan of care, old or new medications and discharge planning   CM will continue to follow for care coordination and update assessment as necessary

## 2018-05-01 NOTE — PROGRESS NOTES
[facility] Internal Medicine Progress Note  Patient: Toribio Kenny 80 y o  female   MRN: [id number]  PCP: Kevin Barbosa DO  Unit/Bed#: -[facility] Encounter: [id number]  Date Of Visit: 18    Assessment:    Principal Problem:    Asthmatic bronchitis  Active Problems:    Scoliosis    Benign essential tremor    Restless leg    Venous insufficiency of both lower extremities    Hypothyroid    Acute respiratory failure with hypoxia ([facility] [address] )      Plan:    · Acute hypoxic respiratory failure likely due to acute bronchitis continue supplemental oxygen, wean as tolerated to keep O2 sats more than 90-92%  · Acute bronchitis continue, doxycycline to complete 5 day course Solu-Medrol 40 mg q 12 hours, respiratory treatments, supportive care  · Scoliosis stable  · Hypothyroid  · Lower extremity chronic venous insufficiency  · Benign essential tremor  · Out of bed as able, ambulation as able       VTE Pharmacologic Prophylaxis:   Pharmacologic: Enoxaparin (Lovenox)  Mechanical VTE Prophylaxis in Place: Yes    Patient Centered Rounds: I have performed bedside rounds with nursing staff today  Discussions with Specialists or Other Care Team Provider:     Education and Discussions with Family / Patient: pt, called and updated daughter Matthew Ventura over phone    Time Spent for Care: 30 minutes  More than 50% of total time spent on counseling and coordination of care as described above      Current Length of Stay: 1 day(s)    Current Patient Status: Inpatient   Certification Statement: The patient will continue to require additional inpatient hospital stay due to As mentioned    Discharge Plan / Estimated Discharge Date:  Likely discharge next 24-48 hours clinical and symptomatic improvement    Code Status: Level 1 - Full Code      Subjective:     Patient reports mild improvement in her dyspnea and cough  History chart labs medications reviewed    Objective:     Vitals:   Temp (24hrs), Av °F (37 2 °C), Min:98 4 °F (36 9 °C), Max:99 9 °F (37 7 °C)    HR:  [] 88  Resp:  [18-25] 19  BP: (106-115)/(49-57) 109/56  SpO2:  [85 %-98 %] 93 %  Body mass index is 31 2 kg/m²  Input and Output Summary (last 24 hours):     No intake or output data in the 24 hours ending 05/01/18 1352    Physical Exam:     Physical Exam    Comfortably sitting up in chair  Neck supple  Lungs bilateral rhonchi diminished breath sounds at bases  Heart sounds S1-S2 noted  Abdomen soft nontender  Pulses present  Awake alert obeys simple commands  No rash    Additional Data:     Labs:      Results from last 7 days  Lab Units 05/01/18  0458   WBC Thousand/uL 6 54   HEMOGLOBIN g/dL 13 1   HEMATOCRIT % 41 5   PLATELETS Thousands/uL 280   NEUTROS PCT % 75   LYMPHS PCT % 15   MONOS PCT % 10   EOS PCT % 0       Results from last 7 days  Lab Units 05/01/18  0458 04/30/18  1535   SODIUM mmol/L 137 134*   POTASSIUM mmol/L 4 2 4 1   CHLORIDE mmol/L 97* 96*   CO2 mmol/L 37* 35*   BUN mg/dL 16 16   CREATININE mg/dL 0 53* 0 47*   CALCIUM mg/dL 8 8 8 7   TOTAL PROTEIN g/dL  --  7 6   BILIRUBIN TOTAL mg/dL  --  0 30   ALK PHOS U/L  --  82   ALT U/L  --  31   AST U/L  --  35   GLUCOSE RANDOM mg/dL 125 116       Results from last 7 days  Lab Units 04/30/18  1535   INR  0 94       * I Have Reviewed All Lab Data Listed Above  * Additional Pertinent Lab Tests Reviewed:  [facility] Admission Reviewed    Imaging:    Imaging Reports Reviewed Today Include:   Imaging Personally Reviewed by Myself Includes:  Chest x-ray personally reviewed no active lung disease scoliosis noted    Recent Cultures (last 7 days):           Last 24 Hours Medication List:     Current Facility-Administered Medications:  albuterol 2 5 mg Nebulization Q6H PRN Nahid Elmore MD   benzonatate 100 mg Oral TID PRN Heather Silverman MD   docusate sodium 100 mg Oral BID Heather Silverman MD   doxycycline hyclate 100 mg Oral Q12H Noemi Rebollar MD   enoxaparin 40 mg Subcutaneous Daily Heather Silverman MD furosemide 40 mg Oral BID Howard Atkinson MD   guaiFENesin 600 mg Oral Q12H Robert Black MD   levalbuterol 1 25 mg Nebulization Q6H Laxmi Luu MD   levothyroxine 75 mcg Oral Early Morning Howard Atkinson MD   methylPREDNISolone sodium succinate 40 mg Intravenous Q12H [facility] Howard Atkinson MD   pramipexole 0 125 mg Oral TID Howard Atkinson MD   primidone 50 mg Oral Q12H [facility] Howard Atkinson MD   saccharomyces boulardii 250 mg Oral BID Howard Atkinson MD   sodium chloride 3 mL Nebulization Q6H Laxmi Luu MD   traMADol 50 mg Oral TID Howard Atkinson MD        Today, Patient Was Seen By: Dom Adam MD    ** Please Note: This note has been constructed using a voice recognition system   **
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
## 2018-05-01 NOTE — PLAN OF CARE
Problem: PAIN - ADULT  Goal: Verbalizes/displays adequate comfort level or baseline comfort level  Interventions:  - Encourage patient to monitor pain and request assistance  - Assess pain using appropriate pain scale  - Administer analgesics based on type and severity of pain and evaluate response  - Implement non-pharmacological measures as appropriate and evaluate response  - Consider cultural and social influences on pain and pain management  - Notify physician/advanced practitioner if interventions unsuccessful or patient reports new pain  Outcome: Progressing      Problem: SAFETY ADULT  Goal: Patient will remain free of falls  INTERVENTIONS:  - Assess patient frequently for physical needs  -  Identify cognitive and physical deficits and behaviors that affect risk of falls    -  [place] fall precautions as indicated by assessment   - Educate patient/family on patient safety including physical limitations  - Instruct patient to call for assistance with activity based on assessment  - Modify environment to reduce risk of injury  - Consider OT/PT consult to assist with strengthening/mobility  Outcome: Progressing    Goal: Maintain or return to baseline ADL function  INTERVENTIONS:  -  Assess patient's ability to carry out ADLs; assess patient's baseline for ADL function and identify physical deficits which impact ability to perform ADLs (bathing, care of mouth/teeth, toileting, grooming, dressing, etc )  - Assess/evaluate cause of self-care deficits   - Assess range of motion  - Assess patient's mobility; develop plan if impaired  - Assess patient's need for assistive devices and provide as appropriate  - Encourage maximum independence but intervene and supervise when necessary  ¯ Involve family in performance of ADLs  ¯ Assess for home care needs following discharge   ¯ Request OT consult to assist with ADL evaluation and planning for discharge  ¯ Provide patient education as appropriate  Outcome: Progressing    Goal: Maintain or return mobility status to optimal level  INTERVENTIONS:  - Assess patient's baseline mobility status (ambulation, transfers, stairs, etc )    - Identify cognitive and physical deficits and behaviors that affect mobility  - Identify mobility aids required to assist with transfers and/or ambulation (gait belt, sit-to-stand, lift, walker, cane, etc )  - [place] fall precautions as indicated by assessment  - Record patient progress and toleration of activity level on Mobility SBAR; progress patient to next Phase/Stage  - Instruct patient to call for assistance with activity based on assessment  - Request Rehabilitation consult to assist with strengthening/weightbearing, etc   Outcome: Progressing      Problem: Nutrition/Hydration-ADULT  Goal: Nutrient/Hydration intake appropriate for improving, restoring or maintaining nutritional needs  Monitor and assess patient's nutrition/hydration status for malnutrition (ex- brittle hair, bruises, dry skin, pale skin and conjunctiva, muscle wasting, smooth red tongue, and disorientation)  Collaborate with interdisciplinary team and initiate plan and interventions as ordered  Monitor patient's weight and dietary intake as ordered or per policy  Utilize nutrition screening tool and intervene per policy  Determine patient's food preferences and provide high-protein, high-caloric foods as appropriate       INTERVENTIONS:  - Monitor oral intake, urinary output, labs, and treatment plans  - Assess nutrition and hydration status and recommend course of action  - Evaluate amount of meals eaten  - Assist patient with eating if necessary   - Allow adequate time for meals  - Recommend/ encourage appropriate diets, oral nutritional supplements, and vitamin/mineral supplements  - Order, calculate, and assess calorie counts as needed  - Recommend, monitor, and adjust tube feedings and TPN/PPN based on assessed needs  - Assess need for intravenous fluids  - Provide specific nutrition/hydration education as appropriate  - Include patient/family/caregiver in decisions related to nutrition  Outcome: Progressing

## 2018-05-01 NOTE — PHYSICIAN ADVISOR
This patient is a 80 y o  y/o female who is admitted to the hospital for Shortness of Breath (Pt  started with cough on Friday, had cxr was wnl  Pt  reports coughing and congestion getting worse, pox 70% on room air  Pox on 6 liters 99%)       The patient presented to the ED on 4/30/18 at 1453 and was admitted to the hospital on 4/30/2018 1453  History of Present Illness includes: Rosa Elena Sandoval is a 80 y o  female who presents with shortness of breath since Friday  She saw her PCP who prescribed an antibiotic and cough suppressant  She took two doses and has not improved  She was noted to be hypoxic upon arrival to ED and given bronchodilators and IV steroid with improvement  She reports white sputum when coughing  Denies nasal congestion, sore throat, body aches  Denies weight gain, edema  Denies travel or sick contacts  Denies exposure to toxins, gases, dusts, or fumes  Vital signs in the ER are as follows ED Triage Vitals   Temperature Pulse Respirations Blood Pressure SpO2   04/30/18 1506 04/30/18 1506 04/30/18 1506 04/30/18 1510 04/30/18 1506   98 9 °F (37 2 °C) (!) 109 18 108/54 98 %      Temp Source Heart Rate Source Patient Position - Orthostatic VS BP Location FiO2 (%)   04/30/18 1506 04/30/18 1506 04/30/18 1510 04/30/18 1510 --   Oral Monitor Lying Right arm       Pain Score       04/30/18 1506       No Pain             Treatment in the ER included: basic labs, imaging, nebs, IV steroids  XR chest 2 views   Final Result by Rozetta Nissen, MD (04/30 1636)       No significant infiltrate or pleural effusion identified  The patient is admitted as INPATIENT  and has remained hospitalized for 1 day(s)  Last vital signs were Blood pressure 109/56, pulse 88, temperature 99 1 °F (37 3 °C), temperature source Oral, resp  rate 19, height 4' 9" (1 448 m), weight 65 4 kg (144 lb 2 9 oz), SpO2 93 %       Treatment includes: IV steroids Q12 hrs, nebs, po abx, resp protocol and supplemental O2  Results include:       0  Lab Value Date/Time   TROPONINI 0 04 04/30/2018 1535                 Results from last 7 days  Lab Units 05/01/18  0458 04/30/18  1535   WBC Thousand/uL 6 54 8 46   HEMOGLOBIN g/dL 13 1 13 6   HEMATOCRIT % 41 5 43 3   PLATELETS Thousands/uL 280 268         Results from last 7 days  Lab Units 05/01/18  0458 04/30/18  1535   SODIUM mmol/L 137 134*   POTASSIUM mmol/L 4 2 4 1   CHLORIDE mmol/L 97* 96*   CO2 mmol/L 37* 35*   BUN mg/dL 16 16   CREATININE mg/dL 0 53* 0 47*   GLUCOSE RANDOM mg/dL 125 116   CALCIUM mg/dL 8 8 8 7       Recent Cultures: The patient is appropriate for  Inpatient Admission  The rationale is as follows: The patient is a 81 y/o female who presented with SOB and hypoxia  She was admitted for acute bronchitis and required IV steroids Q12 hrs, nebs, po abx, resp protocol and supplemental O2  There is documentation by the admitting physician that the patient would require greater than two midnight stay based on medical necessity  Hospitalization is necessary to prevent further deterioration of her clinical condition  After review of the medical chart, labs, imaging, and notes - I agree that the patient meets criteria for INPATIENT ADMISSION

## 2018-05-02 PROCEDURE — 99223 1ST HOSP IP/OBS HIGH 75: CPT | Performed by: INTERNAL MEDICINE

## 2018-05-02 PROCEDURE — 94668 MNPJ CHEST WALL SBSQ: CPT

## 2018-05-02 PROCEDURE — 87631 RESP VIRUS 3-5 TARGETS: CPT | Performed by: NURSE PRACTITIONER

## 2018-05-02 PROCEDURE — 94640 AIRWAY INHALATION TREATMENT: CPT

## 2018-05-02 PROCEDURE — 94760 N-INVAS EAR/PLS OXIMETRY 1: CPT

## 2018-05-02 PROCEDURE — 99232 SBSQ HOSP IP/OBS MODERATE 35: CPT | Performed by: INTERNAL MEDICINE

## 2018-05-02 RX ORDER — PANTOPRAZOLE SODIUM 40 MG/1
40 TABLET, DELAYED RELEASE ORAL
Status: DISCONTINUED | OUTPATIENT
Start: 2018-05-03 | End: 2018-05-08 | Stop reason: HOSPADM

## 2018-05-02 RX ORDER — METHYLPREDNISOLONE SODIUM SUCCINATE 40 MG/ML
40 INJECTION, POWDER, LYOPHILIZED, FOR SOLUTION INTRAMUSCULAR; INTRAVENOUS EVERY 8 HOURS SCHEDULED
Status: DISCONTINUED | OUTPATIENT
Start: 2018-05-02 | End: 2018-05-04

## 2018-05-02 RX ORDER — BUDESONIDE 0.5 MG/2ML
0.5 INHALANT ORAL
Status: DISCONTINUED | OUTPATIENT
Start: 2018-05-02 | End: 2018-05-08 | Stop reason: HOSPADM

## 2018-05-02 RX ORDER — BENZONATATE 100 MG/1
200 CAPSULE ORAL 3 TIMES DAILY PRN
Status: DISCONTINUED | OUTPATIENT
Start: 2018-05-02 | End: 2018-05-02

## 2018-05-02 RX ORDER — IPRATROPIUM BROMIDE AND ALBUTEROL SULFATE 2.5; .5 MG/3ML; MG/3ML
3 SOLUTION RESPIRATORY (INHALATION)
Status: DISCONTINUED | OUTPATIENT
Start: 2018-05-02 | End: 2018-05-02

## 2018-05-02 RX ORDER — GUAIFENESIN 600 MG
1200 TABLET, EXTENDED RELEASE 12 HR ORAL EVERY 12 HOURS SCHEDULED
Status: DISCONTINUED | OUTPATIENT
Start: 2018-05-02 | End: 2018-05-08 | Stop reason: HOSPADM

## 2018-05-02 RX ORDER — BENZONATATE 100 MG/1
100 CAPSULE ORAL 3 TIMES DAILY
Status: DISCONTINUED | OUTPATIENT
Start: 2018-05-02 | End: 2018-05-08 | Stop reason: HOSPADM

## 2018-05-02 RX ADMIN — LEVALBUTEROL HYDROCHLORIDE 1.25 MG: 1.25 SOLUTION, CONCENTRATE RESPIRATORY (INHALATION) at 07:51

## 2018-05-02 RX ADMIN — DOXYCYCLINE 100 MG: 100 CAPSULE ORAL at 09:06

## 2018-05-02 RX ADMIN — Medication 250 MG: at 17:52

## 2018-05-02 RX ADMIN — TRAMADOL HYDROCHLORIDE 50 MG: 50 TABLET ORAL at 09:07

## 2018-05-02 RX ADMIN — METHYLPREDNISOLONE SODIUM SUCCINATE 40 MG: 40 INJECTION, POWDER, FOR SOLUTION INTRAMUSCULAR; INTRAVENOUS at 14:35

## 2018-05-02 RX ADMIN — IPRATROPIUM BROMIDE 0.5 MG: 0.5 SOLUTION RESPIRATORY (INHALATION) at 20:03

## 2018-05-02 RX ADMIN — TRAMADOL HYDROCHLORIDE 50 MG: 50 TABLET ORAL at 00:33

## 2018-05-02 RX ADMIN — GUAIFENESIN 1200 MG: 600 TABLET, EXTENDED RELEASE ORAL at 09:06

## 2018-05-02 RX ADMIN — ALBUTEROL SULFATE 2.5 MG: 2.5 SOLUTION RESPIRATORY (INHALATION) at 06:26

## 2018-05-02 RX ADMIN — BENZONATATE 100 MG: 100 CAPSULE ORAL at 17:51

## 2018-05-02 RX ADMIN — FUROSEMIDE 40 MG: 40 TABLET ORAL at 09:07

## 2018-05-02 RX ADMIN — LEVOTHYROXINE SODIUM 75 MCG: 75 TABLET ORAL at 06:14

## 2018-05-02 RX ADMIN — LEVALBUTEROL HYDROCHLORIDE 1.25 MG: 1.25 SOLUTION, CONCENTRATE RESPIRATORY (INHALATION) at 01:18

## 2018-05-02 RX ADMIN — PRIMIDONE 50 MG: 50 TABLET ORAL at 21:19

## 2018-05-02 RX ADMIN — PRIMIDONE 50 MG: 50 TABLET ORAL at 09:07

## 2018-05-02 RX ADMIN — PRAMIPEXOLE DIHYDROCHLORIDE 0.12 MG: 0.25 TABLET ORAL at 21:19

## 2018-05-02 RX ADMIN — LEVALBUTEROL HYDROCHLORIDE 1.25 MG: 1.25 SOLUTION, CONCENTRATE RESPIRATORY (INHALATION) at 20:03

## 2018-05-02 RX ADMIN — METHYLPREDNISOLONE SODIUM SUCCINATE 40 MG: 40 INJECTION, POWDER, FOR SOLUTION INTRAMUSCULAR; INTRAVENOUS at 09:07

## 2018-05-02 RX ADMIN — GUAIFENESIN 1200 MG: 600 TABLET, EXTENDED RELEASE ORAL at 21:19

## 2018-05-02 RX ADMIN — DOCUSATE SODIUM 100 MG: 100 CAPSULE, LIQUID FILLED ORAL at 09:07

## 2018-05-02 RX ADMIN — PRAMIPEXOLE DIHYDROCHLORIDE 0.12 MG: 0.25 TABLET ORAL at 09:06

## 2018-05-02 RX ADMIN — METHYLPREDNISOLONE SODIUM SUCCINATE 40 MG: 40 INJECTION, POWDER, FOR SOLUTION INTRAMUSCULAR; INTRAVENOUS at 21:19

## 2018-05-02 RX ADMIN — IPRATROPIUM BROMIDE 0.5 MG: 0.5 SOLUTION RESPIRATORY (INHALATION) at 13:32

## 2018-05-02 RX ADMIN — BENZONATATE 100 MG: 100 CAPSULE ORAL at 21:20

## 2018-05-02 RX ADMIN — BENZONATATE 100 MG: 100 CAPSULE ORAL at 06:14

## 2018-05-02 RX ADMIN — ISODIUM CHLORIDE 3 ML: 0.03 SOLUTION RESPIRATORY (INHALATION) at 07:51

## 2018-05-02 RX ADMIN — FUROSEMIDE 40 MG: 40 TABLET ORAL at 18:05

## 2018-05-02 RX ADMIN — DOCUSATE SODIUM 100 MG: 100 CAPSULE, LIQUID FILLED ORAL at 17:52

## 2018-05-02 RX ADMIN — ISODIUM CHLORIDE 3 ML: 0.03 SOLUTION RESPIRATORY (INHALATION) at 01:18

## 2018-05-02 RX ADMIN — BUDESONIDE 0.5 MG: 0.5 INHALANT RESPIRATORY (INHALATION) at 14:10

## 2018-05-02 RX ADMIN — ENOXAPARIN SODIUM 40 MG: 40 INJECTION SUBCUTANEOUS at 09:08

## 2018-05-02 RX ADMIN — LEVALBUTEROL HYDROCHLORIDE 1.25 MG: 1.25 SOLUTION, CONCENTRATE RESPIRATORY (INHALATION) at 13:32

## 2018-05-02 RX ADMIN — Medication 250 MG: at 09:06

## 2018-05-02 RX ADMIN — PRAMIPEXOLE DIHYDROCHLORIDE 0.12 MG: 0.25 TABLET ORAL at 17:51

## 2018-05-02 RX ADMIN — DOXYCYCLINE 100 MG: 100 CAPSULE ORAL at 21:18

## 2018-05-02 RX ADMIN — BUDESONIDE 0.5 MG: 0.5 INHALANT RESPIRATORY (INHALATION) at 20:03

## 2018-05-02 NOTE — CONSULTS
Consultation - Pulmonary Medicine   Twan Gary McMorrow 80 y o  female MRN: [id number]  Unit/Bed#: -01 Encounter: [id number]      Assessment/Plan:    Acute hypoxic respiratory failure due to asthmatic bronchitis, likely viral in etiology   Titrate oxygen to maintain saturations greater than or equal to 88%  Out of bed as tolerated  Continue flutter valve and add incentive spirometer  Continue doxycycline and follow up sputum culture  Send influenza/RSV PCR  Continue Solu-Medrol 40 milligrams IV every 8 hours, which was increased today  Continue Xopenex/Atrovent every 6 hours and add b i d  Pulmicort  She will need to rinse her mouth after use of this  Intractable cough due to above   Scheduled Tessalon and continue Mucinex  Add PPI for GERD as potential aggravating factor  Dysphagia   Speech evaluation  Outpatient follow-up pending hospital course  Discussed with Internal Medicine and nursing  History of Present Illness   Physician Requesting Consult: Deanne Vega MD  Reason for Consult / Principal Problem: Hypoxia, asthmatic bronchitis  Hx and PE limited by: None  Chief Complaint: "I have a cough "  HPI: Mely Staton is a 80 y o   female who presented to [facility] with complaints of worsening cough and hypoxia  Leona Younger reports that she started with a cough last Friday  Prior to that, she had been feeling well  She denies any sick contacts  She reports the cough was productive of clear mucus, but denies any hemoptysis  The cough became progressively worse over the last few days now producing thicker yellow mucus  She was brought to the emergency department due to hypoxia  EMS found her pulse ox in the 70s on room air  She denies any chest pain or tightness  She denies any fevers, chills, or night sweats  She denies any changes in appetite or weight  She denies any nausea, vomiting, or diarrhea, but does have some acid reflux symptoms  She denies any sore throat, myalgias, joint pains, or rashes  She denies any post-nasal drip or headache  She does report that she has had some difficulty swallowing as of the last day or so  She denies any prior lung problems  She denies frequent bronchitis or pneumonia is  She has never used any inhaled therapies or oxygen  She is short of breath with her coughing episodes  Inpatient consult to Pulmonology  Consult performed by: Won Woodruff ordered by: Daren Burrell        Review of Systems   All other systems reviewed and are negative  A full 12-point review of systems was completed and is negative except for those outlined in the HPI  Historical Information   Past Medical History:   Diagnosis Date    Arthritis     Cellulitis     Disease of thyroid gland     Edema     lower ext   Scoliosis      Past Surgical History:   Procedure Laterality Date    HYSTERECTOMY       Social History   History   Alcohol Use    0 6 oz/week    1 Glasses of wine per week     Comment: social     History   Drug Use No     History   Smoking Status    Former Smoker    Packs/day: 0 25    Years: 10 00    Types: Cigarettes    Start date: 80    Quit date: 1955   Smokeless Tobacco    Never Used     Occupational History: Worked in [id number]  Traveled a lot because her  was a   Lives in assisted living now      Family History:   Family History   Problem Relation Age of Onset    Family history unknown: Yes       Meds/Allergies   all current active meds have been reviewed, pertinent pulmonary meds have been reviewed, current meds:   Current Facility-Administered Medications   Medication Dose Route Frequency    benzonatate (TESSALON PERLES) capsule 100 mg  100 mg Oral TID    budesonide (PULMICORT) inhalation solution 0 5 mg  0 5 mg Nebulization Q12H    docusate sodium (COLACE) capsule 100 mg  100 mg Oral BID    doxycycline hyclate (VIBRAMYCIN) capsule 100 mg  100 mg Oral Q12H [address]    enoxaparin (LOVENOX) subcutaneous injection 40 mg  40 mg Subcutaneous Daily    furosemide (LASIX) tablet 40 mg  40 mg Oral BID    guaiFENesin (MUCINEX) 12 hr tablet 1,200 mg  1,200 mg Oral Q12H [facility]    ipratropium (ATROVENT) 0 02 % inhalation solution 0 5 mg  0 5 mg Nebulization Q6H    levalbuterol (XOPENEX) inhalation solution 1 25 mg  1 25 mg Nebulization Q6H    levothyroxine tablet 75 mcg  75 mcg Oral Early Morning    methylPREDNISolone sodium succinate (Solu-MEDROL) injection 40 mg  40 mg Intravenous Q8H [address]    [START ON 5/3/2018] pantoprazole (PROTONIX) EC tablet 40 mg  40 mg Oral Early Morning    pramipexole (MIRAPEX) tablet 0 125 mg  0 125 mg Oral TID    primidone (MYSOLINE) tablet 50 mg  50 mg Oral Q12H [facility]    saccharomyces boulardii (FLORASTOR) capsule 250 mg  250 mg Oral BID    sodium chloride 0 9 % inhalation solution 3 mL  3 mL Nebulization Q6H    traMADol (ULTRAM) tablet 50 mg  50 mg Oral TID    and PTA meds:   Prior to Admission Medications   Prescriptions Last Dose Informant Patient Reported? Taking?    Acetaminophen (ARTHRITIS PAIN RELIEVER PO)   Yes Yes   Sig: Take 650 mg by mouth every 6 (six) hours as needed   Ascorbic Acid (VITAMIN C) 100 MG tablet   Yes Yes   Sig: Take 100 mg by mouth daily   Calcium Carb-Cholecalciferol (CALCIUM+D3) 600-800 MG-UNIT TABS   Yes Yes   Sig: Take by mouth 2 (two) times a day   Cranberry 125 MG TABS   Yes Yes   Sig: Take by mouth   Liniments (SALONPAS PAIN RELIEF PATCH EX)   Yes Yes   Sig: Apply topically daily as needed   Probiotic Product ([address]) CAPS   Yes Yes   Sig: Take by mouth   benzonatate (TESSALON PERLES) 100 mg capsule   Yes Yes   Sig: Take 100 mg by mouth 3 (three) times a day as needed for cough   cefpodoxime (VANTIN) 100 mg tablet   Yes Yes   Sig: Take 100 mg by mouth 2 (two) times a day   docusate sodium (COLACE) 100 mg capsule   Yes Yes   Sig: Take 100 mg by mouth 2 (two) times a day   furosemide (LASIX) 40 mg tablet   Yes Yes   Sig: Take 40 mg by mouth 2 (two) times a day   levothyroxine 75 mcg tablet   Yes Yes   Sig: Take 75 mcg by mouth daily   pramipexole (MIRAPEX) 0 125 mg tablet   Yes Yes   Sig: Take 0 125 mg by mouth 3 (three) times a day   primidone (MYSOLINE) 50 mg tablet   Yes Yes   Sig: Take by mouth 2 (two) times a day   traMADol (ULTRAM) 50 mg tablet   Yes Yes   Sig: Take 50 mg by mouth 3 (three) times a day      Facility-Administered Medications: None       Allergies   Allergen Reactions    Sulfa Antibiotics        Objective   Vitals: Blood pressure 125/60, pulse (!) 112, temperature 99 3 °F (37 4 °C), temperature source Oral, resp  rate 21, height 4' 9" (1 448 m), weight 65 4 kg (144 lb 2 9 oz), SpO2 95 %  2L NC,Body mass index is 31 2 kg/m²  Intake/Output Summary (Last 24 hours) at 05/02/18 1331  Last data filed at 05/02/18 1100   Gross per 24 hour   Intake              960 ml   Output             1400 ml   Net             -440 ml     Invasive Devices     Peripheral Intravenous Line            Peripheral IV 04/30/18 Left Forearm 1 day                Physical Exam   Constitutional: She is oriented to person, place, and time  She appears well-developed and well-nourished  She is cooperative  Non-toxic appearance  No distress  HENT:   Head: Normocephalic and atraumatic  Mouth/Throat: No oropharyngeal exudate  Eyes: EOM are normal  No scleral icterus  Neck: Neck supple  No JVD present  No tracheal deviation present  Cardiovascular: Normal rate, regular rhythm, S1 normal and S2 normal   Exam reveals no gallop and no friction rub  No murmur heard  Pulmonary/Chest: Effort normal  No accessory muscle usage or stridor  No tachypnea  No respiratory distress  She has no decreased breath sounds  She has wheezes  She has rhonchi  She has rales  She exhibits no tenderness  Abdominal: Soft  Bowel sounds are normal  She exhibits no distension  There is no tenderness   There is no rebound and no guarding  Musculoskeletal: She exhibits no edema  Neurological: She is alert and oriented to person, place, and time  She has normal strength  No sensory deficit  GCS eye subscore is 4  GCS verbal subscore is 5  GCS motor subscore is 6  Skin: Skin is warm and dry  No abrasion, no ecchymosis, no lesion and no rash noted  She is not diaphoretic  No cyanosis or erythema  Psychiatric: She has a normal mood and affect  Her speech is normal and behavior is normal    Vitals reviewed  Lab Results:   Lab Results   Component Value Date    WBC 6 54 05/01/2018    HGB 13 1 05/01/2018    HCT 41 5 05/01/2018    MCV 97 05/01/2018     05/01/2018     Lab Results   Component Value Date    GLUCOSE 125 05/01/2018    CALCIUM 8 8 05/01/2018     05/01/2018    K 4 2 05/01/2018    CO2 37 (H) 05/01/2018    CL 97 (L) 05/01/2018    BUN 16 05/01/2018    CREATININE 0 53 (L) 05/01/2018     Imaging Studies: I have personally reviewed pertinent reports   , I have personally reviewed pertinent films in PACS and CXR shows no acute pulmonary infiltrate, effusion, or mass  EKG, Pathology, and Other Studies: None    Pulmonary Results (PFTs, PSG): None    VTE Prophylaxis: Sequential compression device (Venodyne)  and Enoxaparin (Lovenox)    Code Status: Level 1 - Full Code    None    Portions of the record may have been created with voice recognition software  Occasional wrong word or "sound a like" substitutions may have occurred due to the inherent limitations of voice recognition software  Read the chart carefully and recognize, using context, where substitutions have occurred

## 2018-05-02 NOTE — PROGRESS NOTES
[facility] Progress Note  Patient: Noel Ruth 80 y o  female   MRN: [id number]  PCP: Nico Ruiz DO  Unit/Bed#: -01 Encounter: [id number]  Date Of Visit: 18    Assessment:    Principal Problem:    Asthmatic bronchitis  Active Problems:    Scoliosis    Benign essential tremor    Restless leg    Venous insufficiency of both lower extremities    Hypothyroid    Acute respiratory failure with hypoxia ([facility] [address] )      Plan:    · Acute hypoxic respiratory failure continue supplemental oxygen, wean as tolerated to keep O2 sats more than 90-92%  · Acute bronchitis on doxycycline, will increase Solu-Medrol to 40 mg q 8 hours, respiratory treatments, will request Pulmonary inputs, chest therapy, flutter valve  · Scoliosis stable  · Hypothyroid  · Out of bed as able       VTE Pharmacologic Prophylaxis:   Pharmacologic: Enoxaparin (Lovenox)  Mechanical VTE Prophylaxis in Place: Yes    Patient Centered Rounds: I have performed bedside rounds with nursing staff today  Discussions with Specialists or Other Care Team Provider:  Pulmonary    Education and Discussions with Family / Patient:  Discussed with the patient, granddaughter at bedside updated    Time Spent for Care: 30 minutes  More than 50% of total time spent on counseling and coordination of care as described above      Current Length of Stay: 2 day(s)    Current Patient Status: Inpatient   Certification Statement: The patient will continue to require additional inpatient hospital stay due to As mentioned    Discharge Plan / Estimated Discharge Date:  Acute hypoxic respiratory failure, acute bronchitis requiring IV medications close monitoring as outlined    Code Status: Level 1 - Full Code      Subjective:     Complains of cough worsening shortness of breath and chest tightness and chest discomfort      Objective:     Vitals:   Temp (24hrs), Av 7 °F (37 1 °C), Min:98 3 °F (36 8 °C), Max:99 3 °F (37 4 °C)    HR:  [] 112  Resp:  [18-21] 21  BP: ()/(52-60) 125/60  SpO2:  [76 %-98 %] 96 %  Body mass index is 31 2 kg/m²  Input and Output Summary (last 24 hours): Intake/Output Summary (Last 24 hours) at 05/02/18 1335  Last data filed at 05/02/18 1100   Gross per 24 hour   Intake              960 ml   Output             1400 ml   Net             -440 ml       Physical Exam:     Physical Exam    Dyspneic at rest, but able to complete sentences  Neck supple  Lungs bilateral extensive rhonchi crackles diminished breath sounds at bases  Heart sounds S1-S2 noted  Abdomen soft nontender  Pulses present  Awake obeys simple commands  No rash  No pedal edema    Additional Data:     Labs:      Results from last 7 days  Lab Units 05/01/18  0458   WBC Thousand/uL 6 54   HEMOGLOBIN g/dL 13 1   HEMATOCRIT % 41 5   PLATELETS Thousands/uL 280   NEUTROS PCT % 75   LYMPHS PCT % 15   MONOS PCT % 10   EOS PCT % 0       Results from last 7 days  Lab Units 05/01/18  0458 04/30/18  1535   SODIUM mmol/L 137 134*   POTASSIUM mmol/L 4 2 4 1   CHLORIDE mmol/L 97* 96*   CO2 mmol/L 37* 35*   BUN mg/dL 16 16   CREATININE mg/dL 0 53* 0 47*   CALCIUM mg/dL 8 8 8 7   TOTAL PROTEIN g/dL  --  7 6   BILIRUBIN TOTAL mg/dL  --  0 30   ALK PHOS U/L  --  82   ALT U/L  --  31   AST U/L  --  35   GLUCOSE RANDOM mg/dL 125 116       Results from last 7 days  Lab Units 04/30/18  1535   INR  0 94       * I Have Reviewed All Lab Data Listed Above  * Additional Pertinent Lab Tests Reviewed: All Labs Within Last 24 Hours Reviewed    Imaging:    Imaging Reports Reviewed Today Include:   Imaging Personally Reviewed by Myself Includes:     Recent Cultures (last 7 days):       Results from last 7 days  Lab Units 04/30/18  2144 04/30/18  1626 04/30/18  1535   BLOOD CULTURE   --  No Growth at 24 hrs  No Growth at 24 hrs     GRAM STAIN RESULT  2+ Epithelial cells per low power field  Rare Polys  2+ Gram positive cocci in pairs  Rare Gram negative rods  --   -- Last 24 Hours Medication List:     Current Facility-Administered Medications:  benzonatate 100 mg Oral TID Candance Brownie, CRNP   budesonide 0 5 mg Nebulization Q12H ARNIE Malhotra   docusate sodium 100 mg Oral BID Marin Clement MD   doxycycline hyclate 100 mg Oral Q12H Frannie Hyatt MD   enoxaparin 40 mg Subcutaneous Daily Marin Clement MD   furosemide 40 mg Oral BID Marin Clement MD   guaiFENesin 1,200 mg Oral Q12H [address] Patricia Blum MD   ipratropium 0 5 mg Nebulization Q6H Patricia Blum MD   levalbuterol 1 25 mg Nebulization Q6H Adriane Larsen MD   levothyroxine 75 mcg Oral Early Morning Marin Clement MD   methylPREDNISolone sodium succinate 40 mg Intravenous [facility] Patricia Blum MD   [START ON 5/3/2018] pantoprazole 40 mg Oral Early Morning Candance Brownie, CRNP   pramipexole 0 125 mg Oral TID Marin Clement MD   primidone 50 mg Oral Q12H Frannie Hyatt MD   saccharomyces boulardii 250 mg Oral BID Marin Clement MD   sodium chloride 3 mL Nebulization Q6H Adriane Larsen MD   traMADol 50 mg Oral TID Marin Clement MD        Today, Patient Was Seen By: Patricia Blum MD    ** Please Note: This note has been constructed using a voice recognition system   **

## 2018-05-03 LAB
BACTERIA SPT RESP CULT: NORMAL
FLUAV AG SPEC QL: NORMAL
FLUBV AG SPEC QL: NORMAL
GRAM STN SPEC: NORMAL
RSV B RNA SPEC QL NAA+PROBE: NORMAL

## 2018-05-03 PROCEDURE — 94640 AIRWAY INHALATION TREATMENT: CPT

## 2018-05-03 PROCEDURE — 93005 ELECTROCARDIOGRAM TRACING: CPT | Performed by: PHYSICIAN ASSISTANT

## 2018-05-03 PROCEDURE — 94668 MNPJ CHEST WALL SBSQ: CPT

## 2018-05-03 PROCEDURE — 99232 SBSQ HOSP IP/OBS MODERATE 35: CPT | Performed by: INTERNAL MEDICINE

## 2018-05-03 PROCEDURE — G8978 MOBILITY CURRENT STATUS: HCPCS

## 2018-05-03 PROCEDURE — G8979 MOBILITY GOAL STATUS: HCPCS

## 2018-05-03 PROCEDURE — 94760 N-INVAS EAR/PLS OXIMETRY 1: CPT

## 2018-05-03 PROCEDURE — 92610 EVALUATE SWALLOWING FUNCTION: CPT

## 2018-05-03 PROCEDURE — 97163 PT EVAL HIGH COMPLEX 45 MIN: CPT

## 2018-05-03 RX ADMIN — PRIMIDONE 50 MG: 50 TABLET ORAL at 09:54

## 2018-05-03 RX ADMIN — PRAMIPEXOLE DIHYDROCHLORIDE 0.12 MG: 0.25 TABLET ORAL at 09:54

## 2018-05-03 RX ADMIN — PRAMIPEXOLE DIHYDROCHLORIDE 0.12 MG: 0.25 TABLET ORAL at 21:29

## 2018-05-03 RX ADMIN — BUDESONIDE 0.5 MG: 0.5 INHALANT RESPIRATORY (INHALATION) at 20:12

## 2018-05-03 RX ADMIN — LEVOTHYROXINE SODIUM 75 MCG: 75 TABLET ORAL at 06:12

## 2018-05-03 RX ADMIN — LEVALBUTEROL HYDROCHLORIDE 1.25 MG: 1.25 SOLUTION, CONCENTRATE RESPIRATORY (INHALATION) at 13:21

## 2018-05-03 RX ADMIN — IPRATROPIUM BROMIDE 0.5 MG: 0.5 SOLUTION RESPIRATORY (INHALATION) at 01:06

## 2018-05-03 RX ADMIN — GUAIFENESIN 1200 MG: 600 TABLET, EXTENDED RELEASE ORAL at 21:28

## 2018-05-03 RX ADMIN — DOCUSATE SODIUM 100 MG: 100 CAPSULE, LIQUID FILLED ORAL at 09:53

## 2018-05-03 RX ADMIN — LEVALBUTEROL HYDROCHLORIDE 1.25 MG: 1.25 SOLUTION, CONCENTRATE RESPIRATORY (INHALATION) at 01:06

## 2018-05-03 RX ADMIN — PANTOPRAZOLE SODIUM 40 MG: 40 TABLET, DELAYED RELEASE ORAL at 06:12

## 2018-05-03 RX ADMIN — Medication 250 MG: at 17:10

## 2018-05-03 RX ADMIN — BENZONATATE 100 MG: 100 CAPSULE ORAL at 09:52

## 2018-05-03 RX ADMIN — DOXYCYCLINE 100 MG: 100 CAPSULE ORAL at 09:52

## 2018-05-03 RX ADMIN — LEVALBUTEROL HYDROCHLORIDE 1.25 MG: 1.25 SOLUTION, CONCENTRATE RESPIRATORY (INHALATION) at 20:12

## 2018-05-03 RX ADMIN — BENZONATATE 100 MG: 100 CAPSULE ORAL at 17:00

## 2018-05-03 RX ADMIN — ENOXAPARIN SODIUM 40 MG: 40 INJECTION SUBCUTANEOUS at 09:51

## 2018-05-03 RX ADMIN — METHYLPREDNISOLONE SODIUM SUCCINATE 40 MG: 40 INJECTION, POWDER, FOR SOLUTION INTRAMUSCULAR; INTRAVENOUS at 06:12

## 2018-05-03 RX ADMIN — LEVALBUTEROL HYDROCHLORIDE 1.25 MG: 1.25 SOLUTION, CONCENTRATE RESPIRATORY (INHALATION) at 08:02

## 2018-05-03 RX ADMIN — METHYLPREDNISOLONE SODIUM SUCCINATE 40 MG: 40 INJECTION, POWDER, FOR SOLUTION INTRAMUSCULAR; INTRAVENOUS at 21:30

## 2018-05-03 RX ADMIN — FUROSEMIDE 40 MG: 40 TABLET ORAL at 09:53

## 2018-05-03 RX ADMIN — IPRATROPIUM BROMIDE 0.5 MG: 0.5 SOLUTION RESPIRATORY (INHALATION) at 20:12

## 2018-05-03 RX ADMIN — BUDESONIDE 0.5 MG: 0.5 INHALANT RESPIRATORY (INHALATION) at 08:03

## 2018-05-03 RX ADMIN — GUAIFENESIN 1200 MG: 600 TABLET, EXTENDED RELEASE ORAL at 09:52

## 2018-05-03 RX ADMIN — Medication 250 MG: at 09:52

## 2018-05-03 RX ADMIN — METHYLPREDNISOLONE SODIUM SUCCINATE 40 MG: 40 INJECTION, POWDER, FOR SOLUTION INTRAMUSCULAR; INTRAVENOUS at 14:39

## 2018-05-03 RX ADMIN — FUROSEMIDE 40 MG: 40 TABLET ORAL at 17:00

## 2018-05-03 RX ADMIN — DOCUSATE SODIUM 100 MG: 100 CAPSULE, LIQUID FILLED ORAL at 17:00

## 2018-05-03 RX ADMIN — PRAMIPEXOLE DIHYDROCHLORIDE 0.12 MG: 0.25 TABLET ORAL at 17:00

## 2018-05-03 RX ADMIN — IPRATROPIUM BROMIDE 0.5 MG: 0.5 SOLUTION RESPIRATORY (INHALATION) at 13:21

## 2018-05-03 RX ADMIN — PRIMIDONE 50 MG: 50 TABLET ORAL at 21:28

## 2018-05-03 RX ADMIN — DOXYCYCLINE 100 MG: 100 CAPSULE ORAL at 21:28

## 2018-05-03 RX ADMIN — BENZONATATE 100 MG: 100 CAPSULE ORAL at 21:28

## 2018-05-03 RX ADMIN — IPRATROPIUM BROMIDE 0.5 MG: 0.5 SOLUTION RESPIRATORY (INHALATION) at 08:02

## 2018-05-03 NOTE — PROGRESS NOTES
[facility] Progress Note  Patient: Nellie Lane 80 y o  female   MRN: [id number]  PCP: Pepe Vanegas DO  Unit/Bed#: -01 Encounter: [id number]  Date Of Visit: 18    Assessment:    Principal Problem:    Asthmatic bronchitis  Active Problems:    Scoliosis    Benign essential tremor    Restless leg    Venous insufficiency of both lower extremities    Hypothyroid    Acute respiratory failure with hypoxia (HCC)      Plan:    · Acute hypoxic respiratory failure continue supplemental oxygen, wean as tolerated to keep O2 sats more than 90-92%  · Acute bronchitis likely viral continue IV Solu-Medrol 40 mg q 8 hours, doxycycline to complete 7 day course, respiratory treatments, charge therapy, flutter valve, incentive spirometry, supportive care, pulmonary input noted  · Scoliosis  · Hypothyroid  · Out of bed as able       VTE Pharmacologic Prophylaxis:   Pharmacologic: Enoxaparin (Lovenox)  Mechanical VTE Prophylaxis in Place: Yes    Patient Centered Rounds: I have performed bedside rounds with nursing staff today  Discussions with Specialists or Other Care Team Provider:     Education and Discussions with Family / Patient:  Discussed with the patient    Time Spent for Care: 30 minutes  More than 50% of total time spent on counseling and coordination of care as described above      Current Length of Stay: 3 day(s)    Current Patient Status: Inpatient   Certification Statement: The patient will continue to require additional inpatient hospital stay due to As mentioned    Discharge Plan / Estimated Discharge Date: Acute respiratory failure with hypoxia, acute bronchitis requiring IV medications close monitoring as outlined    Code Status: Level 3 - DNAR and DNI      Subjective:      Complains of cough mucopurulent sputum and dyspnea  Complains of fatigue generalized weakness     Objective:     Vitals:   Temp (24hrs), Av °F (37 2 °C), Min:98 2 °F (36 8 °C), Max:100 2 °F (37 9 °C)    HR:  [] 94  Resp:  [18-22] 18  BP: (104-127)/(50-61) 120/60  SpO2:  [92 %-96 %] 94 %  Body mass index is 31 2 kg/m²  Input and Output Summary (last 24 hours): Intake/Output Summary (Last 24 hours) at 05/03/18 1314  Last data filed at 05/02/18 2000   Gross per 24 hour   Intake                0 ml   Output              700 ml   Net             -700 ml       Physical Exam:     Physical Exam    Dyspneic at rest but able to complete sentences  Neck supple  Lungs bilateral rhonchi improved since yesterday diminished breath sounds at bases  Heart sounds S1-S2 noted  Abdomen soft nontender  Pulses present  Awake alert obeys simple commands  No rash  No pedal edema    Additional Data:     Labs:      Results from last 7 days  Lab Units 05/01/18  0458   WBC Thousand/uL 6 54   HEMOGLOBIN g/dL 13 1   HEMATOCRIT % 41 5   PLATELETS Thousands/uL 280   NEUTROS PCT % 75   LYMPHS PCT % 15   MONOS PCT % 10   EOS PCT % 0       Results from last 7 days  Lab Units 05/01/18  0458 04/30/18  1535   SODIUM mmol/L 137 134*   POTASSIUM mmol/L 4 2 4 1   CHLORIDE mmol/L 97* 96*   CO2 mmol/L 37* 35*   BUN mg/dL 16 16   CREATININE mg/dL 0 53* 0 47*   CALCIUM mg/dL 8 8 8 7   TOTAL PROTEIN g/dL  --  7 6   BILIRUBIN TOTAL mg/dL  --  0 30   ALK PHOS U/L  --  82   ALT U/L  --  31   AST U/L  --  35   GLUCOSE RANDOM mg/dL 125 116       Results from last 7 days  Lab Units 04/30/18  1535   INR  0 94       * I Have Reviewed All Lab Data Listed Above  * Additional Pertinent Lab Tests Reviewed: All Labs Within Last 24 Hours Reviewed    Imaging:    Imaging Reports Reviewed Today Include:   Imaging Personally Reviewed by Myself Includes:     Recent Cultures (last 7 days):       Results from last 7 days  Lab Units 05/02/18  1444 04/30/18  2144 04/30/18  1626 04/30/18  1535   BLOOD CULTURE   --   --  No Growth at 48 hrs  No Growth at 48 hrs     SPUTUM CULTURE   --  Culture too young- will reincubate  --   --    GRAM STAIN RESULT   --  2+ Epithelial cells per low power field  Rare Polys  2+ Gram positive cocci in pairs  Rare Gram negative rods  --   --    INFLUENZA A PCR  None Detected  --   --   --    INFLUENZA B PCR  None Detected  --   --   --    RSV PCR  None Detected  --   --   --        Last 24 Hours Medication List:     Current Facility-Administered Medications:  benzonatate 100 mg Oral TID ARNIE Luz   budesonide 0 5 mg Nebulization Q12H ARNIE Malhotra   docusate sodium 100 mg Oral BID Rex Becker MD   doxycycline hyclate 100 mg Oral Q12H [address] Rex Becker MD   enoxaparin 40 mg Subcutaneous Daily Rex Becker MD   furosemide 40 mg Oral BID Rex Becker MD   guaiFENesin 1,200 mg Oral Q12H [address] Zachary Mcgowan MD   ipratropium 0 5 mg Nebulization Q6H Zachary Mcgowan MD   levalbuterol 1 25 mg Nebulization Q6H Cleo Cantu MD   levothyroxine 75 mcg Oral Early Morning Rex Becker MD   methylPREDNISolone sodium succinate 40 mg Intravenous Q8H Armand Hare MD   pantoprazole 40 mg Oral Early Morning ARNIE Luz   pramipexole 0 125 mg Oral TID Rex Becker MD   primidone 50 mg Oral Q12H Azeb Cunningham MD   saccharomyces boulardii 250 mg Oral BID Rex Becker MD   traMADol 50 mg Oral TID Rex Becker MD        Today, Patient Was Seen By: Zachary Mcgowan MD    ** Please Note: This note has been constructed using a voice recognition system   **

## 2018-05-03 NOTE — SPEECH THERAPY NOTE
Speech-Language Pathology Bedside Swallow Evaluation        Patient Name: Jae Massey    [id number] Date: 5/3/2018     Problem List  Patient Active Problem List   Diagnosis    Asthmatic bronchitis    Scoliosis    Benign essential tremor    Restless leg    Venous insufficiency of both lower extremities    Hypothyroid    Acute respiratory failure with hypoxia [facility])       Past Medical History  Past Medical History:   Diagnosis Date    Arthritis     Cellulitis     Disease of thyroid gland     Edema     lower ext   Scoliosis        Past Surgical History  Past Surgical History:   Procedure Laterality Date    HYSTERECTOMY         Summary    Oral and pharyngeal stages of swallowing appeared WNL  No overt s/s aspiration  Most of pt's symptoms/ complaints are most likely related to esophageal issues, suspect esophageal dysmotility  Diet Recommendations: regular diet and thin liquids     Recommended Form of Meds: whole with liquid       Other Recommendations/ considerations: No follow up tx needed  Current Medical Status  Pt is a 80 y o  female who presented to [address] [place] [address] 4/30/18 with asthmatic bronchitis  Pt  presents with shortness of breath since Friday  She saw her PCP who prescribed an antibiotic and cough suppressant  She took two doses and has not improved  She was noted to be hypoxic upon arrival to ED and given bronchodilators and IV steroid with improvement  She reports white sputum when coughing  Denies nasal congestion, sore throat, body aches  Past medical history:   Please see H&P for details      Special Studies:  CXR: 4/30/18  No significant infiltrate or pleural effusion identified      Social/Education/Vocational Hx:  Pt lives in assisted living facility      Swallow Information   Current Risks for Dysphagia & Aspiration: viral infection, bronchitis     Current Symptoms/Concerns: c/o dysphagia    Current Diet: regular diet and thin liquids      Baseline Diet: regular diet and thin liquids      Baseline Assessment   Behavior/Cognition: alert    Speech/Language Status: able to participate in conversation and able to follow commands    Patient Positioning: upright in chair       Swallow Mechanism Exam   Facial: symmetrical  Labial: WFL  Lingual: WFL  Velum: unable to visualize  Mandible: adequate ROM  Dentition: adequate  Vocal quality:clear/adequate   Volitional Cough: strong/productive   Respiratory: NC      Consistencies Assessed and Performance   Consistencies Administered: pt seen at breakfast  c/o not liking the food  Pt ate cream of wheat, blueberry muffin and drank hot tea by cup  Oral Stage: pt w/ adequate bolus retrieval, mastication, manipulation, oral control and transfer of all consistencies  Pharyngeal Stage: Swallows were timely and complete  Pt w/ delayed moist cough x1, suspect not related to swallowing or aspiration risk  Esophageal Concerns: pt c/o thicker and dry food going down slowly   Belching was noted during meal        Results Reviewed with: patient and RN

## 2018-05-03 NOTE — PLAN OF CARE
Problem: PHYSICAL THERAPY ADULT  Goal: Performs mobility at highest level of function for planned discharge setting  See evaluation for individualized goals  Treatment/Interventions: Functional transfer training, LE strengthening/ROM, Therapeutic exercise, Endurance training, Patient/family training, Equipment eval/education, Bed mobility, Gait training  Equipment Recommended: Other (Comment) (roller walker)       See flowsheet documentation for full assessment, interventions and recommendations  Prognosis: Fair  Problem List: Decreased strength, Decreased endurance, Decreased range of motion, Impaired balance, Decreased mobility, Decreased coordination, Decreased safety awareness, Impaired hearing  Assessment: Pt presents with shortness of breath since Friday  She saw her PCP who prescribed an antibiotic and cough suppressant  She took two doses and has not improved  She was noted to be hypoxic upon arrival to ED and given bronchodilators and IV steroid with improvement  She reports white sputum when coughing  Dx: acute hypoxic respiratory failure  order placed for PT eval and tx, w/ activity order of up and OOB as tolerated  pt presents w/ comorbidities of arthritis, cellulitis, LE edema, and scoliosis and personal factors of ambulating w/ assistive device, inability to perform ADLs and advanced age  pt presents w/ weakness, decreased ROM, decreased endurance, impaired balance, gait deviations, impaired hearing, impaired coordination, decreased safety awareness and fall risk  these impairments are evident in findings from physical examination (weakness, decreased ROM and impaired coordination), mobility assessment (need for min to mod assist w/ all phases of mobility when usually mobilizing independently, tolerance to only 12 feet of ambulation, need for cuing for mobility and breathing technique), and Barthel Index: 40/100  pt needed input for task focus and mobility technique   pt is at risk for falls due to physical and safety awareness deficits  pt's clinical presentation is unstable/unpredictable (evident in tachycardia, need for assist w/ all phases of mobility when usually mobilizing independently, tolerance to only 12 feet of ambulation, need for supplemental oxygen in order to maintain oxygen saturation, need for input for mobility technique/safety)  pt needs inpatient PT tx to improve mobility deficits  discharge recommendation is for IP rehab to reduce fall risk and maximize level of functional independence  Recommendation: Post acute IP rehab          See flowsheet documentation for full assessment

## 2018-05-03 NOTE — PROGRESS NOTES
Progress Note - Pulmonary   Chucky Riff 80 y o  female MRN: [id number]  Unit/Bed#: -01 Encounter: [id number]    Assessment/Plan:    Acute hypoxic respiratory failure due to asthmatic bronchitis, likely viral in etiology              Titrate oxygen to maintain saturations greater than or equal to 88%  Out of bed as tolerated  Continue flutter valve and incentive spirometer  Continue doxycycline and follow-up sputum culture  Follow-up influenza/RSV PCR  Continue Solu-Medrol 40 milligrams IV every 8 hours today  Continue Xopenex/Atrovent every 6 hours and b i d  Pulmicort  She will need to rinse her mouth after use of this      Intractable cough due to above              Scheduled Tessalon and continue Mucinex  Continue PPI for GERD as potential aggravating factor      Dysphagia              Speech evaluation pending      Outpatient follow-up pending hospital course  Discussed with Internal Medicine  Subjective: Boris Branch feels slightly better today in regard to cough, but SOB is unchanged  She is sitting OOB in the chair  She denies chest pain or hemoptysis  Objective:     Vitals: Blood pressure 120/60, pulse 94, temperature 98 2 °F (36 8 °C), temperature source Oral, resp  rate 18, height 4' 9" (1 448 m), weight 65 4 kg (144 lb 2 9 oz), SpO2 94 %  5L NC,Body mass index is 31 2 kg/m²  Intake/Output Summary (Last 24 hours) at 05/03/18 1014  Last data filed at 05/02/18 2000   Gross per 24 hour   Intake              200 ml   Output              700 ml   Net             -500 ml       Invasive Devices     Peripheral Intravenous Line            Peripheral IV 04/30/18 Left Forearm 2 days                Physical Exam:     Physical Exam   Constitutional: She is oriented to person, place, and time  She appears well-developed and well-nourished  She is cooperative  Non-toxic appearance  No distress     HENT:   Head: Normocephalic and atraumatic  Mouth/Throat: No oropharyngeal exudate  Eyes: EOM are normal  No scleral icterus  Neck: Neck supple  No JVD present  No tracheal deviation present  Cardiovascular: Normal rate, regular rhythm, S1 normal and S2 normal   Exam reveals no gallop and no friction rub  No murmur heard  Pulmonary/Chest: Effort normal  No accessory muscle usage or stridor  No tachypnea  No respiratory distress  She has no decreased breath sounds  She has wheezes  She has no rhonchi  She has rales  She exhibits no tenderness  Abdominal: Soft  Bowel sounds are normal  She exhibits no distension  There is no tenderness  There is no rebound and no guarding  Musculoskeletal: She exhibits no edema  Neurological: She is alert and oriented to person, place, and time  She has normal strength  No sensory deficit  GCS eye subscore is 4  GCS verbal subscore is 5  GCS motor subscore is 6  Skin: Skin is warm and dry  No abrasion, no ecchymosis, no lesion and no rash noted  She is not diaphoretic  No cyanosis or erythema  Psychiatric: She has a normal mood and affect  Her speech is normal and behavior is normal    Vitals reviewed      Labs:     Sputum culture pending  Influenza/RSV PCR pending  Blood Cultures NG x 48 hours    Imaging and other studies: None today

## 2018-05-03 NOTE — PHYSICAL THERAPY NOTE
PHYSICAL THERAPY EVALUATION NOTE    Patient Name: Dana Prescott  [id number] Date: 5/3/2018  AGE:   80 y o  Mrn:   [id number]  ADMIT DX:  Shortness of breath [R06 02]  Hypoxia [R09 02]  Acute bronchospasm due to viral infection [J98 01, B34 9]    Past Medical History:   Diagnosis Date    Arthritis     Cellulitis     Disease of thyroid gland     Edema     lower ext   Scoliosis      Length Of Stay: 3  PHYSICAL THERAPY EVALUATION :   05/03/18 1701   Pain Assessment   Pain Assessment No/denies pain   Home Living   Type of Home Assisted living  [employer])   Additional Comments ambulates w/ roller walker or mobilizes w/ wheelchair  received assist w/ ADLs  no falls in last 6 months  no history of supplemental oxygen use  Prior Function   Comments pt seen sitting in chair w/ family present  agreed to PT eval  denied pain or dizziness  pt needed input for task focus  Restrictions/Precautions   Other Precautions Chair Alarm;O2;Fall Risk;Hard of hearing   General   Additional Pertinent History 5/2/18 at 19:25, heart rate ws 109 BPM    Family/Caregiver Present Yes   Cognition   Arousal/Participation Cooperative   Orientation Level Disoriented to person;Disoriented to place; Disoriented to time  (pt was identified w/ full name and birth date)   Following Commands Follows one step commands with increased time or repetition   Comments 5L oxygen via nasal cannula   resting pulse ox 95% and 66 BPM, active 91% and 84 BPM     RUE Assessment   RUE Assessment WFL  (3/5)   LUE Assessment   LUE Assessment WFL  (3/5)   RLE Assessment   RLE Assessment X  (3/5, knee flexion and ankle dorsiflexion 3-/5)   LLE Assessment   LLE Assessment WFL  (3/5)   Coordination   Movements are Fluid and Coordinated 0   Coordination and Movement Description impaired coordination all extremities   Light Touch   RLE Light Touch Grossly intact   LLE Light Touch Grossly intact   Transfers   Sit to Stand 4  Minimal assistance   Additional items Assist x 1; Increased time required;Verbal cues  (for hand placement, LE positioning)   Stand to Sit 4  Minimal assistance   Additional items Assist x 1;Verbal cues  (for body positioning, hand placement)   Ambulation/Elevation   Gait pattern R Foot drag;R Knee Ryanne; Shuffling; Inconsistent gretchen; Foward flexed; Excessively slow   Gait Assistance 3  Moderate assist   Additional items Assist x 1;Verbal cues; Tactile cues  (for walker positioning, posture, breathing technique)   Assistive Device Rolling walker   Distance 12, 4 feet w/ seated rest break x 3 minutes  (additional not possible due to fatigue)   Balance   Static Sitting Fair   Dynamic Sitting Poor +   Static Standing Poor +   Dynamic Standing Poor   Ambulatory Poor  (w/ roller walker)   Activity Tolerance   Activity Tolerance Patient limited by fatigue   [facility] [address] [facility]   Assessment   Prognosis Fair   Problem List Decreased strength;Decreased endurance;Decreased range of motion; Impaired balance;Decreased mobility; Decreased coordination;Decreased safety awareness; Impaired hearing   Assessment Pt presents with shortness of breath since Friday  She saw her PCP who prescribed an antibiotic and cough suppressant  She took two doses and has not improved  She was noted to be hypoxic upon arrival to ED and given bronchodilators and IV steroid with improvement  She reports white sputum when coughing  Dx: acute hypoxic respiratory failure  order placed for PT eval and tx, w/ activity order of up and OOB as tolerated  pt presents w/ comorbidities of arthritis, cellulitis, LE edema, and scoliosis and personal factors of ambulating w/ assistive device, inability to perform ADLs and advanced age   pt presents w/ weakness, decreased ROM, decreased endurance, impaired balance, gait deviations, impaired hearing, impaired coordination, decreased safety awareness and fall risk  these impairments are evident in findings from physical examination (weakness, decreased ROM and impaired coordination), mobility assessment (need for min to mod assist w/ all phases of mobility when usually mobilizing independently, tolerance to only 12 feet of ambulation, need for cuing for mobility and breathing technique), and Barthel Index: 40/100  pt needed input for task focus and mobility technique  pt is at risk for falls due to physical and safety awareness deficits  pt's clinical presentation is unstable/unpredictable (evident in tachycardia, need for assist w/ all phases of mobility when usually mobilizing independently, tolerance to only 12 feet of ambulation, need for supplemental oxygen in order to maintain oxygen saturation, need for input for mobility technique/safety)  pt needs inpatient PT tx to improve mobility deficits  discharge recommendation is for IP rehab to reduce fall risk and maximize level of functional independence  Goals   Patient Goals pt did not state goals when asked   STG Expiration Date 05/13/18   Short Term Goal #1 pt will: Increase bilateral LE strength 1/2 grade to facilitate independent mobility, Perform all bed mobility tasks w/ supervision to decrease fall risk factors, Perform all transfers w/ supervision to improve independence, Ambulate 150 ft  with roller walker w/ supervision w/o LOB, Increase ambulatory balance 1 grade to decrease risk for falls, Complete exercise program independently, Tolerate 3 hr OOB to faciliate upright tolerance and Improve Barthel Index score to 65 or greater to facilitate independence   Treatment Day 0   Plan   Treatment/Interventions Functional transfer training;LE strengthening/ROM; Therapeutic exercise; Endurance training;Patient/family training;Equipment eval/education; Bed mobility;Gait training   PT Frequency 5x/wk   Recommendation   Recommendation Post acute IP rehab   Equipment Recommended Other (Comment)  (roller walker) Barthel Index   Feeding 10   Bathing 0   Grooming Score 0   Dressing Score 0   Bladder Score 5   Bowels Score 10   Toilet Use Score 5   Transfers (Bed/Chair) Score 10   Mobility (Level Surface) Score 0   Stairs Score 0   Barthel Index Score 40     Skilled PT recommended while in hospital and upon DC to progress pt toward treatment goals       Helen Obrien, PT

## 2018-05-04 LAB
ATRIAL RATE: 91 BPM
P AXIS: 77 DEGREES
PR INTERVAL: 148 MS
QRS AXIS: -8 DEGREES
QRSD INTERVAL: 80 MS
QT INTERVAL: 368 MS
QTC INTERVAL: 452 MS
T WAVE AXIS: 12 DEGREES
TROPONIN I SERPL-MCNC: 0.03 NG/ML
TROPONIN I SERPL-MCNC: 0.04 NG/ML
TROPONIN I SERPL-MCNC: 0.05 NG/ML
VENTRICULAR RATE: 91 BPM

## 2018-05-04 PROCEDURE — 99232 SBSQ HOSP IP/OBS MODERATE 35: CPT | Performed by: INTERNAL MEDICINE

## 2018-05-04 PROCEDURE — 94760 N-INVAS EAR/PLS OXIMETRY 1: CPT

## 2018-05-04 PROCEDURE — 97530 THERAPEUTIC ACTIVITIES: CPT

## 2018-05-04 PROCEDURE — 93010 ELECTROCARDIOGRAM REPORT: CPT | Performed by: INTERNAL MEDICINE

## 2018-05-04 PROCEDURE — 97110 THERAPEUTIC EXERCISES: CPT

## 2018-05-04 PROCEDURE — 97116 GAIT TRAINING THERAPY: CPT

## 2018-05-04 PROCEDURE — 94668 MNPJ CHEST WALL SBSQ: CPT

## 2018-05-04 PROCEDURE — 94640 AIRWAY INHALATION TREATMENT: CPT

## 2018-05-04 PROCEDURE — 84484 ASSAY OF TROPONIN QUANT: CPT | Performed by: PHYSICIAN ASSISTANT

## 2018-05-04 RX ORDER — NITROGLYCERIN 0.4 MG/1
0.4 TABLET SUBLINGUAL
Status: DISCONTINUED | OUTPATIENT
Start: 2018-05-04 | End: 2018-05-08 | Stop reason: HOSPADM

## 2018-05-04 RX ORDER — METHYLPREDNISOLONE SODIUM SUCCINATE 40 MG/ML
40 INJECTION, POWDER, LYOPHILIZED, FOR SOLUTION INTRAMUSCULAR; INTRAVENOUS EVERY 12 HOURS SCHEDULED
Status: DISCONTINUED | OUTPATIENT
Start: 2018-05-04 | End: 2018-05-05

## 2018-05-04 RX ADMIN — METHYLPREDNISOLONE SODIUM SUCCINATE 40 MG: 40 INJECTION, POWDER, FOR SOLUTION INTRAMUSCULAR; INTRAVENOUS at 20:24

## 2018-05-04 RX ADMIN — FUROSEMIDE 40 MG: 40 TABLET ORAL at 17:46

## 2018-05-04 RX ADMIN — GUAIFENESIN 1200 MG: 600 TABLET, EXTENDED RELEASE ORAL at 08:49

## 2018-05-04 RX ADMIN — FUROSEMIDE 40 MG: 40 TABLET ORAL at 08:50

## 2018-05-04 RX ADMIN — IPRATROPIUM BROMIDE 0.5 MG: 0.5 SOLUTION RESPIRATORY (INHALATION) at 13:37

## 2018-05-04 RX ADMIN — DOXYCYCLINE 100 MG: 100 CAPSULE ORAL at 20:23

## 2018-05-04 RX ADMIN — DOCUSATE SODIUM 100 MG: 100 CAPSULE, LIQUID FILLED ORAL at 08:50

## 2018-05-04 RX ADMIN — LEVOTHYROXINE SODIUM 75 MCG: 75 TABLET ORAL at 06:55

## 2018-05-04 RX ADMIN — BENZONATATE 100 MG: 100 CAPSULE ORAL at 20:23

## 2018-05-04 RX ADMIN — LEVALBUTEROL HYDROCHLORIDE 1.25 MG: 1.25 SOLUTION, CONCENTRATE RESPIRATORY (INHALATION) at 13:37

## 2018-05-04 RX ADMIN — PRAMIPEXOLE DIHYDROCHLORIDE 0.12 MG: 0.25 TABLET ORAL at 17:42

## 2018-05-04 RX ADMIN — ENOXAPARIN SODIUM 40 MG: 40 INJECTION SUBCUTANEOUS at 08:49

## 2018-05-04 RX ADMIN — METHYLPREDNISOLONE SODIUM SUCCINATE 40 MG: 40 INJECTION, POWDER, FOR SOLUTION INTRAMUSCULAR; INTRAVENOUS at 06:55

## 2018-05-04 RX ADMIN — Medication 250 MG: at 08:50

## 2018-05-04 RX ADMIN — BENZONATATE 100 MG: 100 CAPSULE ORAL at 08:50

## 2018-05-04 RX ADMIN — DOCUSATE SODIUM 100 MG: 100 CAPSULE, LIQUID FILLED ORAL at 17:41

## 2018-05-04 RX ADMIN — NITROGLYCERIN 0.4 MG: 0.4 TABLET SUBLINGUAL at 00:19

## 2018-05-04 RX ADMIN — DOXYCYCLINE 100 MG: 100 CAPSULE ORAL at 08:49

## 2018-05-04 RX ADMIN — LEVALBUTEROL HYDROCHLORIDE 1.25 MG: 1.25 SOLUTION, CONCENTRATE RESPIRATORY (INHALATION) at 20:45

## 2018-05-04 RX ADMIN — BUDESONIDE 0.5 MG: 0.5 INHALANT RESPIRATORY (INHALATION) at 07:24

## 2018-05-04 RX ADMIN — LEVALBUTEROL HYDROCHLORIDE 1.25 MG: 1.25 SOLUTION, CONCENTRATE RESPIRATORY (INHALATION) at 07:24

## 2018-05-04 RX ADMIN — TRAMADOL HYDROCHLORIDE 50 MG: 50 TABLET ORAL at 08:50

## 2018-05-04 RX ADMIN — Medication 250 MG: at 17:41

## 2018-05-04 RX ADMIN — GUAIFENESIN 1200 MG: 600 TABLET, EXTENDED RELEASE ORAL at 20:23

## 2018-05-04 RX ADMIN — PRAMIPEXOLE DIHYDROCHLORIDE 0.12 MG: 0.25 TABLET ORAL at 20:22

## 2018-05-04 RX ADMIN — PRAMIPEXOLE DIHYDROCHLORIDE 0.12 MG: 0.25 TABLET ORAL at 08:50

## 2018-05-04 RX ADMIN — IPRATROPIUM BROMIDE 0.5 MG: 0.5 SOLUTION RESPIRATORY (INHALATION) at 20:45

## 2018-05-04 RX ADMIN — LEVALBUTEROL HYDROCHLORIDE 1.25 MG: 1.25 SOLUTION, CONCENTRATE RESPIRATORY (INHALATION) at 01:11

## 2018-05-04 RX ADMIN — BENZONATATE 100 MG: 100 CAPSULE ORAL at 17:41

## 2018-05-04 RX ADMIN — IPRATROPIUM BROMIDE 0.5 MG: 0.5 SOLUTION RESPIRATORY (INHALATION) at 07:24

## 2018-05-04 RX ADMIN — PRIMIDONE 50 MG: 50 TABLET ORAL at 08:49

## 2018-05-04 RX ADMIN — PANTOPRAZOLE SODIUM 40 MG: 40 TABLET, DELAYED RELEASE ORAL at 06:55

## 2018-05-04 RX ADMIN — BUDESONIDE 0.5 MG: 0.5 INHALANT RESPIRATORY (INHALATION) at 20:45

## 2018-05-04 RX ADMIN — PRIMIDONE 50 MG: 50 TABLET ORAL at 20:23

## 2018-05-04 RX ADMIN — IPRATROPIUM BROMIDE 0.5 MG: 0.5 SOLUTION RESPIRATORY (INHALATION) at 01:11

## 2018-05-04 NOTE — PHYSICAL THERAPY NOTE
PHYSICAL THERAPY NOTE    Patient Name: Doroteo Roa  [id number]'[id number] Date: 5/4/2018 05/04/18 9323   Pain Assessment   Pain Assessment No/denies pain   Pain Score No Pain   Restrictions/Precautions   Other Precautions Chair Alarm;O2;Fall Risk;Hard of hearing   General   Family/Caregiver Present No   Subjective   Subjective Patient seated in recliner on 5L O2 via nc and is agreeable to therapy session  Pt denies any pain but states "I feel tired and weak "   Bed Mobility   Supine to Sit Unable to assess   Sit to Supine Unable to assess   Additional Comments Patient seatd OOB in recliner pre and post session on 5 L O2 via nc with chair alarm intact and call bell and belongings in reach  Transfers   Sit to Stand 4  Minimal assistance  (contact guard )   Additional items Assist x 1; Increased time required;Verbal cues  (for hand placement)   Stand to Sit 4  Minimal assistance   Additional items Assist x 1; Armrests; Increased time required;Verbal cues  (for hand placement and to control descend into chair)   Ambulation/Elevation   Gait pattern R Foot drag;R Knee Ryanne; Shuffling; Inconsistent gretchen; Foward flexed; Excessively slow   Gait Assistance 4  Minimal assist   Additional items Assist x 1;Verbal cues; Tactile cues  (for walker positioning, upright posture, breathing technique)   Assistive Device Rolling walker   Distance 20 feet, 10 feet   Balance   Static Sitting Fair   Dynamic Sitting Poor +   Static Standing Fair -   Dynamic Standing Poor   Ambulatory Poor  (with roller walker)   Activity Tolerance   Activity Tolerance Patient limited by fatigue   Nurse Made Aware spoke to [facility]   Exercises   Glute Sets Sitting;10 reps;AROM; Bilateral  (x 2 sets)   Hip Flexion Sitting;10 reps;AROM; Bilateral  (x 2 sets)   Hip Adduction Sitting;10 reps;AROM; Bilateral  (x 2 sets)   Knee AROM Long Arc Quad Sitting;10 reps;AROM; Bilateral  (x 2 sets)   Ankle Pumps Sitting;20 reps;AROM; Bilateral   Assessment   Prognosis Fair   Problem List Decreased strength;Decreased endurance;Decreased range of motion; Impaired balance;Decreased mobility; Decreased coordination;Decreased safety awareness; Impaired hearing   Assessment Patient demonstrated ability to ambulate further however remains limited to short distances secondary to fatigue  Required contact guard assistance for sit to stand transfers however remains min a of 1 for stand to sit as patient rapidly descends into chair despite verbal cuing  Provided patient with instruction for breathing technqiue as well as posture during ambulation  Patient expresses SOB with mobillity on 5L O2 via nc, SpO2 at rest 95% and decreases to 90-91% with activity  Patient remains limited by fatigue limiting any further ambulation  Continue to focus on gait progression and transfer safety to maximize return to PLOF  Goals   Patient Goals did not state   STG Expiration Date 05/13/18   Short Term Goal #1 Per last PT goal assessment  Jenny Valdes pt will: Increase bilateral LE strength 1/2 grade to facilitate independent mobility, Perform all bed mobility tasks w/ supervision to decrease fall risk factors, Perform all transfers w/ supervision to improve independence, Ambulate 150 ft  with roller walker w/ supervision w/o LOB, Increase ambulatory balance 1 grade to decrease risk for falls, Complete exercise program independently, Tolerate 3 hr OOB to faciliate upright tolerance and Improve Barthel Index score to 65 or greater to facilitate independence  Treatment Day 1   Plan   Treatment/Interventions Functional transfer training;LE strengthening/ROM; Therapeutic exercise; Endurance training;Patient/family training;Equipment eval/education; Bed mobility;Gait training   PT Frequency 5x/wk   Recommendation   Recommendation Post acute IP rehab   Equipment Recommended Other (Comment)  (roller walker)       Mere Ramos, PTA

## 2018-05-04 NOTE — PROGRESS NOTES
[facility] Internal Medicine Progress Note  Patient: Leo Spear 80 y o  female   MRN: [id number]  PCP: Wicho Stover DO  Unit/Bed#: -[facility] Encounter: [id number]  Date Of Visit: 05/04/18    Assessment:    Principal Problem:    Asthmatic bronchitis  Active Problems:    Scoliosis    Benign essential tremor    Restless leg    Venous insufficiency of both lower extremities    Hypothyroid    Acute respiratory failure with hypoxia ([facility] [address] )      Plan:      · Acute hypoxic respiratory failure continue supplemental oxygen wean as tolerated to keep O2 sats more than 90%  · Acute bronchitis likely viral on IV Solu-Medrol now at q 12 hours, continue doxycycline to complete 7 day course, continue respiratory treatments, incentive spirometer, flutter valve pulmonary following  · Scoliosis  · Hypothyroid  · Out of bed as able      VTE Pharmacologic Prophylaxis:   Pharmacologic: Enoxaparin (Lovenox)  Mechanical VTE Prophylaxis in Place: Yes    Patient Centered Rounds: I have performed bedside rounds with nursing staff today  Discussions with Specialists or Other Care Team Provider:  Pulmonary Physician assistant    Education and Discussions with Family / Patient:  Patient, called and updated daughter Brandon Painting in detail    Time Spent for Care: 30 minutes  More than 50% of total time spent on counseling and coordination of care as described above      Current Length of Stay: 4 day(s)    Current Patient Status: Inpatient   Certification Statement: The patient will continue to require additional inpatient hospital stay due to As mentioned    Discharge Plan / Estimated Discharge Date:  Acute hypoxic respiratory failure, acute bronchitis requiring IV Solu-Medrol as outlined    Code Status: Level 3 - DNAR and DNI      Subjective:     Complains of cough and shortness of breath slightly improved  Appetite poor  Reports feeling lethargic and tired and fatigued with generalized weakness    Objective:     Vitals:   Temp (24hrs), Av 6 °F (37 °C), Min:98 °F (36 7 °C), Max:99 7 °F (37 6 °C)    HR:  [93-99] 99  Resp:  [18-20] 18  BP: (102-135)/(53-70) 135/65  SpO2:  [92 %-97 %] 94 %  Body mass index is 31 2 kg/m²  Input and Output Summary (last 24 hours): Intake/Output Summary (Last 24 hours) at 18 1257  Last data filed at 18 1001   Gross per 24 hour   Intake              700 ml   Output                0 ml   Net              700 ml       Physical Exam:     Physical Exam    Dyspneic at rest but able to complete sentences  Neck supple  Lungs diminished breath sounds bilaterally bilateral rhonchi improved since yesterday  Heart sounds S1-S2 noted  Abdomen soft nontender  Pulses present  No pedal edema  Awake obeys simple commands  No rash    Additional Data:     Labs:      Results from last 7 days  Lab Units 18  0458   WBC Thousand/uL 6 54   HEMOGLOBIN g/dL 13 1   HEMATOCRIT % 41 5   PLATELETS Thousands/uL 280   NEUTROS PCT % 75   LYMPHS PCT % 15   MONOS PCT % 10   EOS PCT % 0       Results from last 7 days  Lab Units 18  0458 18  1535   SODIUM mmol/L 137 134*   POTASSIUM mmol/L 4 2 4 1   CHLORIDE mmol/L 97* 96*   CO2 mmol/L 37* 35*   BUN mg/dL 16 16   CREATININE mg/dL 0 53* 0 47*   CALCIUM mg/dL 8 8 8 7   TOTAL PROTEIN g/dL  --  7 6   BILIRUBIN TOTAL mg/dL  --  0 30   ALK PHOS U/L  --  82   ALT U/L  --  31   AST U/L  --  35   GLUCOSE RANDOM mg/dL 125 116       Results from last 7 days  Lab Units 18  1535   INR  0 94       * I Have Reviewed All Lab Data Listed Above  * Additional Pertinent Lab Tests Reviewed: All Labs Within Last 24 Hours Reviewed    Imaging:    Imaging Reports Reviewed Today Include:   Imaging Personally Reviewed by Myself Includes:     Recent Cultures (last 7 days):       Results from last 7 days  Lab Units 18  1444 18  2144 18  1626 18  1535   BLOOD CULTURE   --   --  No Growth at 72 hrs  No Growth at 72 hrs     SPUTUM CULTURE   --  Culture too young- will reincubate  --   --    GRAM STAIN RESULT   --  2+ Epithelial cells per low power field  Rare Polys  2+ Gram positive cocci in pairs  Rare Gram negative rods  --   --    INFLUENZA A PCR  None Detected  --   --   --    INFLUENZA B PCR  None Detected  --   --   --    RSV PCR  None Detected  --   --   --        Last 24 Hours Medication List:     Current Facility-Administered Medications:  benzonatate 100 mg Oral TID Candance Brownie, CRNP   budesonide 0 5 mg Nebulization Q12H ARNIE Malhotra   docusate sodium 100 mg Oral BID Marin Clement MD   doxycycline hyclate 100 mg Oral Q12H Frannie Hyatt MD   enoxaparin 40 mg Subcutaneous Daily Marin Clement MD   furosemide 40 mg Oral BID Marin Clement MD   guaiFENesin 1,200 mg Oral Q12H [address] Patricia Blum MD   ipratropium 0 5 mg Nebulization Q6H Patricia Blum MD   levalbuterol 1 25 mg Nebulization Q6H Adriane Larsen MD   levothyroxine 75 mcg Oral Early Morning Marin Clement MD   methylPREDNISolone sodium succinate 40 mg Intravenous Q12H [address] Candance Brownie, CRNP   nitroglycerin 0 4 mg Sublingual Q5 Min PRN Pallavi Vann PA-C   pantoprazole 40 mg Oral Early Morning Candance Brownie, CRNP   pramipexole 0 125 mg Oral TID Marin Clement MD   primidone 50 mg Oral Q12H Frannie Hyatt MD   saccharomyces boulardii 250 mg Oral BID Marin Clement MD   traMADol 50 mg Oral TID Marin Clement MD        Today, Patient Was Seen By: Patricia Blum MD    ** Please Note: This note has been constructed using a voice recognition system   **

## 2018-05-04 NOTE — PROGRESS NOTES
Progress Note - Pulmonary   Toribio Kenny 80 y o  female MRN: [id number]  Unit/Bed#: -01 Encounter: [id number]    Assessment/Plan:    Acute hypoxic respiratory failure due to asthmatic bronchitis, likely viral in etiology - improved              Titrate oxygen to maintain saturations greater than or equal to 88%              Out of bed as tolerated               Continue flutter valve and incentive spirometer               Continue doxycycline and follow-up sputum culture               Continue Solu-Medrol 40 milligrams IV, but decreased to twice daily dosing  Hopeful transition to prednisone in next 48 to 72 hours  She feels anxious with the prednisone; therefore, depending on her improvement, may be able to use expedited taper               Continue Xopenex/Atrovent every 6 hours and b i d  Pulmicort   She will need to rinse her mouth after use of this  May benefit from 8 to 12 weeks and inhaler of ICS/LABA but; however, will evaluate closer to discharge  She wants to limit medications needed      Intractable cough due to above              Scheduled Tessalon and continue Mucinex               Continue PPI for GERD as potential aggravating factor      Dysphagia              Speech evaluation noted  Least restrictive diet and aspiration precautions      Outpatient follow-up pending hospital course   Discussed with Internal Medicine and patient's family at bedside  Jhonny Landa remains a Level 2 DNAR/DNI  Her son-in-law has her living well and will bring it in  Counseling/Coordination of Care: Total floor / unit time spent today 50 minutes  Greater than 50% of total time was spent with the patient and / or family counseling and / or coordination of care  A description of the counseling / coordination of care: Discussion with the care team and care planning as above, as well as extensive discussion with the patient's daughter and son-in-law at the bedside    We discussed code status, current care plan, and plans for discharge  Subjective: Hunter Tijerina is sitting out of bed in the chair today with her daughter and son-in-law at bedside  She reports that she feels about the same, but does agree that her cough/sputum production and shortness of breath is improved  She continues to expectorate clear mucus  Swallowing is improved  She denies chest pain  She is anxious that she is not getting better quicker  Objective:     Vitals: Blood pressure 135/65, pulse 99, temperature 98 1 °F (36 7 °C), temperature source Oral, resp  rate 18, height 4' 9" (1 448 m), weight 65 4 kg (144 lb 2 9 oz), SpO2 94 %  5 liters nasal cannula,Body mass index is 31 2 kg/m²  Intake/Output Summary (Last 24 hours) at 05/04/18 1133  Last data filed at 05/04/18 1001   Gross per 24 hour   Intake              700 ml   Output                0 ml   Net              700 ml       Invasive Devices     Peripheral Intravenous Line            Peripheral IV 05/04/18 Left Forearm less than 1 day                Physical Exam:     Physical Exam   Constitutional: She is oriented to person, place, and time  She appears well-developed and well-nourished  She is cooperative  Non-toxic appearance  No distress  HENT:   Head: Normocephalic and atraumatic  Mouth/Throat: No oropharyngeal exudate  Eyes: EOM are normal  No scleral icterus  Neck: Neck supple  No JVD present  No tracheal deviation present  Cardiovascular: Normal rate, regular rhythm, S1 normal and S2 normal   Exam reveals no gallop and no friction rub  No murmur heard  Pulmonary/Chest: Effort normal  No accessory muscle usage or stridor  No tachypnea  No respiratory distress  She has no decreased breath sounds  She has no wheezes  She has no rhonchi  She has rales  She exhibits no tenderness  Abdominal: Soft  Bowel sounds are normal  She exhibits no distension  There is no tenderness  There is no rebound and no guarding  Musculoskeletal: She exhibits no edema  Neurological: She is alert and oriented to person, place, and time  She has normal strength  No sensory deficit  GCS eye subscore is 4  GCS verbal subscore is 5  GCS motor subscore is 6  Skin: Skin is warm and dry  No abrasion, no ecchymosis, no lesion and no rash noted  She is not diaphoretic  No cyanosis or erythema  Psychiatric: She has a normal mood and affect  Her speech is normal and behavior is normal    Vitals reviewed      Labs:  Sputum culture pending    Influenza/RSV PCR negative    Imaging and other studies: None today

## 2018-05-04 NOTE — PLAN OF CARE
Problem: PHYSICAL THERAPY ADULT  Goal: Performs mobility at highest level of function for planned discharge setting  See evaluation for individualized goals  Treatment/Interventions: Functional transfer training, LE strengthening/ROM, Therapeutic exercise, Endurance training, Patient/family training, Equipment eval/education, Bed mobility, Gait training  Equipment Recommended: Other (Comment) (roller walker)       See flowsheet documentation for full assessment, interventions and recommendations  Outcome: Progressing  Prognosis: Fair  Problem List: Decreased strength, Decreased endurance, Decreased range of motion, Impaired balance, Decreased mobility, Decreased coordination, Decreased safety awareness, Impaired hearing  Assessment: Patient demonstrated ability to ambulate further however remains limited to short distances secondary to fatigue  Required contact guard assistance for sit to stand transfers however remains min a of 1 for stand to sit as patient rapidly descends into chair despite verbal cuing  Provided patient with instruction for breathing technqiue as well as posture during ambulation  Patient expresses SOB with mobillity on 5L O2 via nc, SpO2 at rest 95% and decreases to 90-91% with activity  Patient remains limited by fatigue limiting any further ambulation  Continue to focus on gait progression and transfer safety to maximize return to PLOF  Recommendation: Post acute IP rehab          See flowsheet documentation for full assessment

## 2018-05-04 NOTE — CASE MANAGEMENT
Continued Stay Review    Date: 5/4/2018    Vital Signs: /65 (BP Location: Right arm)   Pulse 99   Temp 98 1 °F (36 7 °C) (Oral)   Resp 18   Ht 4' 9" (1 448 m)   Wt 65 4 kg (144 lb 2 9 oz)   SpO2 94%   BMI 31 20 kg/m²     Medications:   Scheduled Meds:   Current Facility-Administered Medications:  benzonatate 100 mg Oral TID [facility] Giuseppe, ARNIE   budesonide 0 5 mg Nebulization Q12H ARNIE Malhotra   docusate sodium 100 mg Oral BID Gabo Hernandez MD   doxycycline hyclate 100 mg Oral Q12H Jazmín Jennings MD   enoxaparin 40 mg Subcutaneous Daily Gabo Hernandez MD   furosemide 40 mg Oral BID Gabo Hernandez MD   guaiFENesin 1,200 mg Oral Q12H [facility] Mare Sanches MD   ipratropium 0 5 mg Nebulization Q6H Mare Sanches MD   levalbuterol 1 25 mg Nebulization Q6H Sweetie Gonzalez MD   levothyroxine 75 mcg Oral Early Morning Gabo Hernandez MD   methylPREDNISolone sodium succinate 40 mg Intravenous Q12H [facility] Giuseppe, ARNIE   nitroglycerin 0 4 mg Sublingual Q5 Min PRN Kaila Vences PA-C   pantoprazole 40 mg Oral Early Morning Meredeth Coldfederica, CRNP   pramipexole 0 125 mg Oral TID Gabo Hernandez MD   primidone 50 mg Oral Q12H [address], MD   saccharomyces boulardii 250 mg Oral BID Gabo Hernandez MD   traMADol 50 mg Oral TID Gabo Hernandez MD     Continuous Infusions:    PRN Meds: nitroglycerin sl - used x 1  Abnormal Labs/Diagnostic Results:   Maximum troponin 0 05    Age/Sex: 80 y o  female     Assessment/Plan:  Per pulmonary: Acute hypoxic respiratory failure due to asthmatic bronchitis, likely viral in etiology - improved              Titrate oxygen to maintain saturations greater than or equal to 88%              Out of bed as tolerated               Continue flutter valve and incentive spirometer               Continue doxycycline and follow-up sputum culture               Continue Solu-Medrol 40 milligrams IV, but decreased to twice daily dosing    Hopeful transition to prednisone in next 48 to 72 hours  She feels anxious with the prednisone; therefore, depending on her improvement, may be able to use expedited taper               Continue Xopenex/Atrovent every 6 hours and b i d  Pulmicort   She will need to rinse her mouth after use of this  May benefit from 8 to 12 weeks and inhaler of ICS/LABA but; however, will evaluate closer to discharge  She wants to limit medications needed      Intractable cough due to above              Scheduled Tessalon and continue Mucinex               Continue PPI for GERD as potential aggravating factor      Dysphagia              Speech evaluation noted  Least restrictive diet and aspiration precautions      Discharge Plan: PT recommends post acute IP rehab

## 2018-05-04 NOTE — PLAN OF CARE
DISCHARGE PLANNING - CARE MANAGEMENT     Discharge to post-acute care or home with appropriate resources Progressing        Nutrition/Hydration-ADULT     Nutrient/Hydration intake appropriate for improving, restoring or maintaining nutritional needs Progressing        PAIN - ADULT     Verbalizes/displays adequate comfort level or baseline comfort level Progressing        Potential for Falls     Patient will remain free of falls Progressing        SAFETY ADULT     Patient will remain free of falls Progressing     Maintain or return to baseline ADL function Progressing     Maintain or return mobility status to optimal level Progressing

## 2018-05-05 ENCOUNTER — APPOINTMENT (INPATIENT)
Dept: RADIOLOGY | Facility: HOSPITAL | Age: 83
DRG: 189 | End: 2018-05-05
Payer: MEDICARE

## 2018-05-05 LAB
BACTERIA BLD CULT: NORMAL
BACTERIA BLD CULT: NORMAL

## 2018-05-05 PROCEDURE — 94640 AIRWAY INHALATION TREATMENT: CPT

## 2018-05-05 PROCEDURE — 94668 MNPJ CHEST WALL SBSQ: CPT

## 2018-05-05 PROCEDURE — 71046 X-RAY EXAM CHEST 2 VIEWS: CPT

## 2018-05-05 PROCEDURE — 94760 N-INVAS EAR/PLS OXIMETRY 1: CPT

## 2018-05-05 PROCEDURE — 99233 SBSQ HOSP IP/OBS HIGH 50: CPT | Performed by: INTERNAL MEDICINE

## 2018-05-05 RX ORDER — PREDNISONE 20 MG/1
40 TABLET ORAL DAILY
Status: DISCONTINUED | OUTPATIENT
Start: 2018-05-06 | End: 2018-05-08 | Stop reason: HOSPADM

## 2018-05-05 RX ADMIN — BUDESONIDE 0.5 MG: 0.5 INHALANT RESPIRATORY (INHALATION) at 07:17

## 2018-05-05 RX ADMIN — IPRATROPIUM BROMIDE 0.5 MG: 0.5 SOLUTION RESPIRATORY (INHALATION) at 01:48

## 2018-05-05 RX ADMIN — IPRATROPIUM BROMIDE 0.5 MG: 0.5 SOLUTION RESPIRATORY (INHALATION) at 13:55

## 2018-05-05 RX ADMIN — TRAMADOL HYDROCHLORIDE 50 MG: 50 TABLET ORAL at 08:16

## 2018-05-05 RX ADMIN — FUROSEMIDE 40 MG: 40 TABLET ORAL at 08:17

## 2018-05-05 RX ADMIN — BENZONATATE 100 MG: 100 CAPSULE ORAL at 08:16

## 2018-05-05 RX ADMIN — IPRATROPIUM BROMIDE 0.5 MG: 0.5 SOLUTION RESPIRATORY (INHALATION) at 19:24

## 2018-05-05 RX ADMIN — PRAMIPEXOLE DIHYDROCHLORIDE 0.12 MG: 0.25 TABLET ORAL at 16:39

## 2018-05-05 RX ADMIN — PRAMIPEXOLE DIHYDROCHLORIDE 0.12 MG: 0.25 TABLET ORAL at 08:17

## 2018-05-05 RX ADMIN — Medication 250 MG: at 08:17

## 2018-05-05 RX ADMIN — METHYLPREDNISOLONE SODIUM SUCCINATE 40 MG: 40 INJECTION, POWDER, FOR SOLUTION INTRAMUSCULAR; INTRAVENOUS at 08:16

## 2018-05-05 RX ADMIN — PRAMIPEXOLE DIHYDROCHLORIDE 0.12 MG: 0.25 TABLET ORAL at 20:56

## 2018-05-05 RX ADMIN — FUROSEMIDE 40 MG: 40 TABLET ORAL at 17:42

## 2018-05-05 RX ADMIN — LEVALBUTEROL HYDROCHLORIDE 1.25 MG: 1.25 SOLUTION, CONCENTRATE RESPIRATORY (INHALATION) at 13:55

## 2018-05-05 RX ADMIN — BENZONATATE 100 MG: 100 CAPSULE ORAL at 16:40

## 2018-05-05 RX ADMIN — DOXYCYCLINE 100 MG: 100 CAPSULE ORAL at 08:16

## 2018-05-05 RX ADMIN — BENZONATATE 100 MG: 100 CAPSULE ORAL at 20:55

## 2018-05-05 RX ADMIN — LEVALBUTEROL HYDROCHLORIDE 1.25 MG: 1.25 SOLUTION, CONCENTRATE RESPIRATORY (INHALATION) at 01:48

## 2018-05-05 RX ADMIN — PANTOPRAZOLE SODIUM 40 MG: 40 TABLET, DELAYED RELEASE ORAL at 06:05

## 2018-05-05 RX ADMIN — ENOXAPARIN SODIUM 40 MG: 40 INJECTION SUBCUTANEOUS at 08:16

## 2018-05-05 RX ADMIN — BUDESONIDE 0.5 MG: 0.5 INHALANT RESPIRATORY (INHALATION) at 19:24

## 2018-05-05 RX ADMIN — PRIMIDONE 50 MG: 50 TABLET ORAL at 08:17

## 2018-05-05 RX ADMIN — LEVOTHYROXINE SODIUM 75 MCG: 75 TABLET ORAL at 06:04

## 2018-05-05 RX ADMIN — Medication 250 MG: at 17:42

## 2018-05-05 RX ADMIN — DOCUSATE SODIUM 100 MG: 100 CAPSULE, LIQUID FILLED ORAL at 08:17

## 2018-05-05 RX ADMIN — LEVALBUTEROL HYDROCHLORIDE 1.25 MG: 1.25 SOLUTION, CONCENTRATE RESPIRATORY (INHALATION) at 07:17

## 2018-05-05 RX ADMIN — IPRATROPIUM BROMIDE 0.5 MG: 0.5 SOLUTION RESPIRATORY (INHALATION) at 07:17

## 2018-05-05 RX ADMIN — PRIMIDONE 50 MG: 50 TABLET ORAL at 20:56

## 2018-05-05 RX ADMIN — LEVALBUTEROL HYDROCHLORIDE 1.25 MG: 1.25 SOLUTION, CONCENTRATE RESPIRATORY (INHALATION) at 19:24

## 2018-05-05 RX ADMIN — DOXYCYCLINE 100 MG: 100 CAPSULE ORAL at 20:56

## 2018-05-05 RX ADMIN — GUAIFENESIN 1200 MG: 600 TABLET, EXTENDED RELEASE ORAL at 08:16

## 2018-05-05 RX ADMIN — GUAIFENESIN 1200 MG: 600 TABLET, EXTENDED RELEASE ORAL at 20:55

## 2018-05-05 NOTE — ASSESSMENT & PLAN NOTE
· Continue with the supplemental oxygen to keep saturation above 90%  · Add incentive spirometry  · Will obtain a chest x-ray given the patient is complaining of more shortness of breath and not feeling well today

## 2018-05-05 NOTE — ASSESSMENT & PLAN NOTE
Suspect acute viral infection/bronchitis with underlying COPD,   h/o tobacco use from age [age] less then a pack/day  Continue with the antibiotics to finish the course  Continue with the steroids-patient symptomatically not getting better so will continue with the current dose of steroids    Will obtain a chest x-ray

## 2018-05-05 NOTE — PROGRESS NOTES
Pt is resting comfortably in the chair  Pt has no complaints  Pt shows no signs of distress  Will continue to monitor

## 2018-05-05 NOTE — PROGRESS NOTES
Progress Note - Jose Bowen 9/14/1926, 80 y o  female MRN: [id number]    Unit/Bed#: -01 Encounter: [id number]    Primary Care Provider: Kirsty Tadeo DO   Date and time admitted to hospital: 4/30/2018  2:53 PM        Acute respiratory failure with hypoxia (HCC)   Assessment & Plan    · Continue with the supplemental oxygen to keep saturation above 90%  · Add incentive spirometry  · Will obtain a chest x-ray given the patient is complaining of more shortness of breath and not feeling well today        Hypothyroid   Assessment & Plan    Continue levothyroxine        Venous insufficiency of both lower extremities   Assessment & Plan    Continue home dose of lasix        Restless leg   Assessment & Plan    Continue mirapex        Benign essential tremor   Assessment & Plan    Continue primidone        Scoliosis   Assessment & Plan    Known diagnosis  Likely contributing to shortness of breath given its severity        * Asthmatic bronchitis   Assessment & Plan    Suspect acute viral infection/bronchitis with underlying COPD,   h/o tobacco use from age [age] less then a pack/day  Continue with the antibiotics to finish the course  Continue with the steroids-patient symptomatically not getting better so will continue with the current dose of steroids  Will obtain a chest x-ray          VTE Pharmacologic Prophylaxis:   Pharmacologic: Enoxaparin (Lovenox)  Mechanical VTE Prophylaxis in Place: Yes    Patient Centered Rounds: I have performed bedside rounds with nursing staff today  Discussions with Specialists or Other Care Team Provider:     Education and Discussions with Family / Patient:  Updated patient    Time Spent for Care: 30 minutes  More than 50% of total time spent on counseling and coordination of care as described above      Current Length of Stay: 5 day(s)    Current Patient Status: Inpatient   Certification Statement: The patient will continue to require additional inpatient hospital stay due to Bronchitis requiring IV steroids    Discharge Plan:   need rehabilitation    Code Status: Level 3 - DNAR and DNI      Subjective:   Patient seen and examined  Patient reported that she is feeling very tired today  Patient feels like she is doing worse than she did yesterday  Cough present but no significant expectoration  Objective:     Vitals:   Temp (24hrs), Av 3 °F (36 8 °C), Min:98 °F (36 7 °C), Max:98 8 °F (37 1 °C)    HR:  [] 73  Resp:  [18] 18  BP: (107-121)/(58-76) 110/62  SpO2:  [91 %-100 %] 98 %  Body mass index is 31 2 kg/m²  Input and Output Summary (last 24 hours):     No intake or output data in the 24 hours ending 18 1427    Physical Exam:     Physical Exam   Constitutional: She appears well-developed and well-nourished  HENT:   Head: Normocephalic and atraumatic  Neck: Normal range of motion  Neck supple  Cardiovascular: Normal rate and regular rhythm  No murmur heard  Pulmonary/Chest: Effort normal  She has wheezes (Faint occasional wheezing present)  She has rales  Abdominal: Soft  Bowel sounds are normal  She exhibits no distension  There is no tenderness  Musculoskeletal: Normal range of motion  She exhibits no edema  Neurological: She is alert  No cranial nerve deficit  Skin: Skin is warm and dry  No erythema         Additional Data:     Labs:      Results from last 7 days  Lab Units 18  0458   WBC Thousand/uL 6 54   HEMOGLOBIN g/dL 13 1   HEMATOCRIT % 41 5   PLATELETS Thousands/uL 280   NEUTROS PCT % 75   LYMPHS PCT % 15   MONOS PCT % 10   EOS PCT % 0       Results from last 7 days  Lab Units 18  0458 18  1535   SODIUM mmol/L 137 134*   POTASSIUM mmol/L 4 2 4 1   CHLORIDE mmol/L 97* 96*   CO2 mmol/L 37* 35*   BUN mg/dL 16 16   CREATININE mg/dL 0 53* 0 47*   CALCIUM mg/dL 8 8 8 7   TOTAL PROTEIN g/dL  --  7 6   BILIRUBIN TOTAL mg/dL  --  0 30   ALK PHOS U/L  --  82   ALT U/L  --  31   AST U/L  --  35   GLUCOSE RANDOM mg/dL 125 116 Results from last 7 days  Lab Units 04/30/18  1535   INR  0 94       * I Have Reviewed All Lab Data Listed Above  * Additional Pertinent Lab Tests Reviewed: Delfino [address] Admission Reviewed    Imaging:    Imaging Reports Reviewed Today Include:  Chest x-ray  Imaging Personally Reviewed by Myself Includes:     Recent Cultures (last 7 days):       Results from last 7 days  Lab Units 05/02/18  1444 04/30/18  2144 04/30/18  1626 04/30/18  1535   BLOOD CULTURE   --   --  No Growth After 4 Days  No Growth After 4 Days     SPUTUM CULTURE   --  Culture too young- will reincubate  --   --    GRAM STAIN RESULT   --  2+ Epithelial cells per low power field  Rare Polys  2+ Gram positive cocci in pairs  Rare Gram negative rods  --   --    INFLUENZA A PCR  None Detected  --   --   --    INFLUENZA B PCR  None Detected  --   --   --    RSV PCR  None Detected  --   --   --        Last 24 Hours Medication List:     Current Facility-Administered Medications:  benzonatate 100 mg Oral TID ARNIE Devi   budesonide 0 5 mg Nebulization Q12H ARNIE Malhotra   docusate sodium 100 mg Oral BID Gregory Palomo MD   doxycycline hyclate 100 mg Oral Q12H Eli Priest MD   enoxaparin 40 mg Subcutaneous Daily Gregory Palomo MD   furosemide 40 mg Oral BID Gregory Palomo MD   guaiFENesin 1,200 mg Oral Q12H [address] Deanne Vega MD   ipratropium 0 5 mg Nebulization Q6H Deanne Vega MD   levalbuterol 1 25 mg Nebulization Q6H Jagdeep Maradiaga MD   levothyroxine 75 mcg Oral Early Morning Gregory Palomo MD   methylPREDNISolone sodium succinate 40 mg Intravenous Q12H [address] ARNIE Devi   nitroglycerin 0 4 mg Sublingual Q5 Min PRN Aleksander Brown PA-C   pantoprazole 40 mg Oral Early Morning ARNIE Devi   pramipexole 0 125 mg Oral TID Gregory Palomo MD   primidone 50 mg Oral Q12H [address] Gregory Palomo MD   saccharomyces boulardii 250 mg Oral BID Gregory Palomo MD   traMADol 50 mg Oral TID Rex Becker MD        Today, Patient Was Seen By: Cleo Cantu MD    ** Please Note: Dictation voice to text software may have been used in the creation of this document   **

## 2018-05-06 PROCEDURE — 99233 SBSQ HOSP IP/OBS HIGH 50: CPT | Performed by: INTERNAL MEDICINE

## 2018-05-06 PROCEDURE — 94668 MNPJ CHEST WALL SBSQ: CPT

## 2018-05-06 PROCEDURE — 99232 SBSQ HOSP IP/OBS MODERATE 35: CPT | Performed by: FAMILY MEDICINE

## 2018-05-06 PROCEDURE — 94760 N-INVAS EAR/PLS OXIMETRY 1: CPT

## 2018-05-06 PROCEDURE — 94664 DEMO&/EVAL PT USE INHALER: CPT

## 2018-05-06 PROCEDURE — 94640 AIRWAY INHALATION TREATMENT: CPT

## 2018-05-06 RX ADMIN — GUAIFENESIN 1200 MG: 600 TABLET, EXTENDED RELEASE ORAL at 21:21

## 2018-05-06 RX ADMIN — FUROSEMIDE 40 MG: 40 TABLET ORAL at 17:39

## 2018-05-06 RX ADMIN — ENOXAPARIN SODIUM 40 MG: 40 INJECTION SUBCUTANEOUS at 09:53

## 2018-05-06 RX ADMIN — LEVOTHYROXINE SODIUM 75 MCG: 75 TABLET ORAL at 06:22

## 2018-05-06 RX ADMIN — IPRATROPIUM BROMIDE 0.5 MG: 0.5 SOLUTION RESPIRATORY (INHALATION) at 13:54

## 2018-05-06 RX ADMIN — PREDNISONE 40 MG: 20 TABLET ORAL at 09:54

## 2018-05-06 RX ADMIN — BENZONATATE 100 MG: 100 CAPSULE ORAL at 15:41

## 2018-05-06 RX ADMIN — PRAMIPEXOLE DIHYDROCHLORIDE 0.12 MG: 0.25 TABLET ORAL at 15:41

## 2018-05-06 RX ADMIN — Medication 250 MG: at 09:53

## 2018-05-06 RX ADMIN — BUDESONIDE 0.5 MG: 0.5 INHALANT RESPIRATORY (INHALATION) at 07:06

## 2018-05-06 RX ADMIN — DOXYCYCLINE 100 MG: 100 CAPSULE ORAL at 09:53

## 2018-05-06 RX ADMIN — IPRATROPIUM BROMIDE 0.5 MG: 0.5 SOLUTION RESPIRATORY (INHALATION) at 01:36

## 2018-05-06 RX ADMIN — BENZONATATE 100 MG: 100 CAPSULE ORAL at 21:21

## 2018-05-06 RX ADMIN — Medication 250 MG: at 17:39

## 2018-05-06 RX ADMIN — IPRATROPIUM BROMIDE 0.5 MG: 0.5 SOLUTION RESPIRATORY (INHALATION) at 07:06

## 2018-05-06 RX ADMIN — PRIMIDONE 50 MG: 50 TABLET ORAL at 21:20

## 2018-05-06 RX ADMIN — PANTOPRAZOLE SODIUM 40 MG: 40 TABLET, DELAYED RELEASE ORAL at 06:22

## 2018-05-06 RX ADMIN — LEVALBUTEROL HYDROCHLORIDE 1.25 MG: 1.25 SOLUTION, CONCENTRATE RESPIRATORY (INHALATION) at 07:06

## 2018-05-06 RX ADMIN — PRIMIDONE 50 MG: 50 TABLET ORAL at 09:53

## 2018-05-06 RX ADMIN — BENZONATATE 100 MG: 100 CAPSULE ORAL at 09:54

## 2018-05-06 RX ADMIN — BUDESONIDE 0.5 MG: 0.5 INHALANT RESPIRATORY (INHALATION) at 21:09

## 2018-05-06 RX ADMIN — FUROSEMIDE 40 MG: 40 TABLET ORAL at 09:54

## 2018-05-06 RX ADMIN — PRAMIPEXOLE DIHYDROCHLORIDE 0.12 MG: 0.25 TABLET ORAL at 09:54

## 2018-05-06 RX ADMIN — GUAIFENESIN 1200 MG: 600 TABLET, EXTENDED RELEASE ORAL at 09:53

## 2018-05-06 RX ADMIN — LEVALBUTEROL HYDROCHLORIDE 1.25 MG: 1.25 SOLUTION, CONCENTRATE RESPIRATORY (INHALATION) at 13:54

## 2018-05-06 RX ADMIN — IPRATROPIUM BROMIDE 0.5 MG: 0.5 SOLUTION RESPIRATORY (INHALATION) at 21:10

## 2018-05-06 RX ADMIN — PRAMIPEXOLE DIHYDROCHLORIDE 0.12 MG: 0.25 TABLET ORAL at 21:20

## 2018-05-06 RX ADMIN — LEVALBUTEROL HYDROCHLORIDE 1.25 MG: 1.25 SOLUTION, CONCENTRATE RESPIRATORY (INHALATION) at 01:36

## 2018-05-06 RX ADMIN — LEVALBUTEROL HYDROCHLORIDE 1.25 MG: 1.25 SOLUTION, CONCENTRATE RESPIRATORY (INHALATION) at 21:10

## 2018-05-06 NOTE — ASSESSMENT & PLAN NOTE
· Continue with the supplemental oxygen to keep saturation above 90%  · Add incentive spirometry  · Chest x-ray reviewed  · Discussed with the patient about the importance of doing the incentive spirometry, wean off of the oxygen as tolerated

## 2018-05-06 NOTE — ASSESSMENT & PLAN NOTE
Suspect acute viral infection/bronchitis with underlying COPD,   h/o tobacco use from age [age] less then a pack/day  Finished the course of antibiotics  Continue with the steroids  Pulmonology on board

## 2018-05-06 NOTE — PROGRESS NOTES
Progress Note - Rosa Elena Held 9/14/1926, 80 y o  female MRN: [id number]    Unit/Bed#: -01 Encounter: [id number]    Primary Care Provider: Radha Keenan DO   Date and time admitted to hospital: 4/30/2018  2:53 PM        Acute respiratory failure with hypoxia (HCC)   Assessment & Plan    · Continue with the supplemental oxygen to keep saturation above 90%  · Add incentive spirometry  · Chest x-ray reviewed  · Discussed with the patient about the importance of doing the incentive spirometry, wean off of the oxygen as tolerated        Hypothyroid   Assessment & Plan    Continue levothyroxine        Venous insufficiency of both lower extremities   Assessment & Plan    Continue home dose of lasix        Restless leg   Assessment & Plan    Continue mirapex        Benign essential tremor   Assessment & Plan    Continue primidone        Scoliosis   Assessment & Plan    Known diagnosis  Likely contributing to shortness of breath/hypoxia given its severity        * Asthmatic bronchitis   Assessment & Plan    Suspect acute viral infection/bronchitis with underlying COPD,   h/o tobacco use from age [age] less then a pack/day  Finished the course of antibiotics  Continue with the steroids  Pulmonology on board              VTE Pharmacologic Prophylaxis:   Pharmacologic: Heparin  Mechanical VTE Prophylaxis in Place: Yes    Patient Centered Rounds: I have performed bedside rounds with nursing staff today  Discussions with Specialists or Other Care Team Provider:  Pulmonology    Education and Discussions with Family / Patient: called daughter, -no answer, no option to leave a voicemail either    Time Spent for Care: 30 minutes  More than 50% of total time spent on counseling and coordination of care as described above      Current Length of Stay: 6 day(s)    Current Patient Status: Inpatient   Certification Statement: The patient will continue to require additional inpatient hospital stay due to Pending placement    Discharge Plan:     Code Status: Level 3 - DNAR and DNI      Subjective:   Patient seen and examined  Patient reported that she feels very tired  She reported that she is not eating much since she does not like the food from here  Agreeable to go to rehabilitation    Objective:     Vitals:   Temp (24hrs), Av 3 °F (36 8 °C), Min:98 °F (36 7 °C), Max:99 1 °F (37 3 °C)    HR:  [86-98] 98  Resp:  [18] 18  BP: (108-141)/(57-74) 141/74  SpO2:  [95 %-99 %] 96 %  Body mass index is 31 2 kg/m²  Input and Output Summary (last 24 hours):     No intake or output data in the 24 hours ending 18 1457    Physical Exam:     Physical Exam   Constitutional: She appears well-developed and well-nourished  HENT:   Head: Normocephalic  Eyes: Pupils are equal, round, and reactive to light  Pulmonary/Chest: Effort normal  She has no wheezes  She has no rales  Scoliosis noted  Bilateral basal crackles present   Abdominal: Soft  Bowel sounds are normal  She exhibits no distension  There is no tenderness  Musculoskeletal: Normal range of motion  She exhibits no edema  Neurological: She is alert  No cranial nerve deficit  Skin: Skin is warm and dry  No erythema         Additional Data:     Labs:      Results from last 7 days  Lab Units 18  0458   WBC Thousand/uL 6 54   HEMOGLOBIN g/dL 13 1   HEMATOCRIT % 41 5   PLATELETS Thousands/uL 280   NEUTROS PCT % 75   LYMPHS PCT % 15   MONOS PCT % 10   EOS PCT % 0       Results from last 7 days  Lab Units 18  0458 18  1535   SODIUM mmol/L 137 134*   POTASSIUM mmol/L 4 2 4 1   CHLORIDE mmol/L 97* 96*   CO2 mmol/L 37* 35*   BUN mg/dL 16 16   CREATININE mg/dL 0 53* 0 47*   CALCIUM mg/dL 8 8 8 7   TOTAL PROTEIN g/dL  --  7 6   BILIRUBIN TOTAL mg/dL  --  0 30   ALK PHOS U/L  --  82   ALT U/L  --  31   AST U/L  --  35   GLUCOSE RANDOM mg/dL 125 116       Results from last 7 days  Lab Units 18  1535   INR  0 94       * I Have Reviewed All Lab Data Listed Above  * Additional Pertinent Lab Tests Reviewed: Delfino [address] Admission Reviewed    Imaging:    Imaging Reports Reviewed Today Include:   Imaging Personally Reviewed by Myself Includes:      Recent Cultures (last 7 days):       Results from last 7 days  Lab Units 05/02/18  1444 04/30/18  2144 04/30/18  1626 04/30/18  1535   BLOOD CULTURE   --   --  No Growth After 5 Days  No Growth After 5 Days  SPUTUM CULTURE   --  Culture too young- will reincubate  --   --    GRAM STAIN RESULT   --  2+ Epithelial cells per low power field  Rare Polys  2+ Gram positive cocci in pairs  Rare Gram negative rods  --   --    INFLUENZA A PCR  None Detected  --   --   --    INFLUENZA B PCR  None Detected  --   --   --    RSV PCR  None Detected  --   --   --        Last 24 Hours Medication List:     Current Facility-Administered Medications:  benzonatate 100 mg Oral TID Candance Brownie, CRNP   budesonide 0 5 mg Nebulization Q12H ARNIE Malhotra   docusate sodium 100 mg Oral BID Marin Clement MD   enoxaparin 40 mg Subcutaneous Daily Marin Clement MD   furosemide 40 mg Oral BID Marin Clement MD   guaiFENesin 1,200 mg Oral Q12H [address] Patricia Blum MD   ipratropium 0 5 mg Nebulization Q6H Patricia Blum MD   levalbuterol 1 25 mg Nebulization Q6H Adriane Larsen MD   levothyroxine 75 mcg Oral Early Morning Marin Clement MD   nitroglycerin 0 4 mg Sublingual Q5 Min PRN [employer], PA-C   pantoprazole 40 mg Oral Early Morning Candance Brownie, CRNP   pramipexole 0 125 mg Oral TID Marin Clement MD   predniSONE 40 mg Oral Daily Bhavana Gerber MD   primidone 50 mg Oral Q12H Frannie Hyatt MD   saccharomyces boulardii 250 mg Oral BID Marin Clement MD   traMADol 50 mg Oral TID Marin Clement MD        Today, Patient Was Seen By: Adriane Larsen MD    ** Please Note: Dictation voice to text software may have been used in the creation of this document   **

## 2018-05-06 NOTE — PROGRESS NOTES
Progress Note - Pulmonary   Tanner Holley 80 y o  female MRN: [id number]  Unit/Bed#: -01 Encounter: [id number]    Assessment/Plan:  Acute hypoxic respiratory failure due to asthmatic bronchitis, likely viral in etiology - improved              Titrate oxygen to maintain saturations greater than or equal to 88%  Check ambulatory pulse oximetry to determine need of supplemental oxygen upon discharge              Out of bed as tolerated               Continue flutter valve and incentive spirometer  Last day of doxycycline today (7/7)              Transition to PO prednisone 40mg x 5 days total course                Continue Xopenex/Atrovent every 6 hours and b i d  Pulmicort       -  She will need to rinse her mouth after use of this      Intractable cough due to above - improving              Scheduled Tessalon and continue Mucinex               Continue PPI for GERD as potential aggravating factor      Dysphagia              Speech evaluation noted               Least restrictive diet and aspiration precautions      Chief Complaint:   Shortness of breath    Subjective:   No acute events overnight  Reports cough has significantly improved  Denies fevers, chills, nausea or vomiting  Objective:     Vitals: Blood pressure 138/71, pulse 88, temperature 98 °F (36 7 °C), temperature source Oral, resp  rate 18, height 4' 9" (1 448 m), weight 65 4 kg (144 lb 2 9 oz), SpO2 96 %  ,Body mass index is 31 2 kg/m²        Intake/Output Summary (Last 24 hours) at 05/06/18 1023  Last data filed at 05/05/18 1300   Gross per 24 hour   Intake              220 ml   Output                0 ml   Net              220 ml       Invasive Devices     Peripheral Intravenous Line            Peripheral IV 05/04/18 Left Forearm 2 days                Physical Exam:   - awake/alert; sitting in chair  -  RRR  -  Crackles at bilateral bases  -  Soft nt/nd  -  Warm well perfused  -  Oriented to person/place/time     Imaging and other studies: I have personally reviewed pertinent films in PACS  Bibasilar  opacities  are  unchanged,  likely  atelectasis  and/or  small  pleural  effusions

## 2018-05-07 PROCEDURE — 97116 GAIT TRAINING THERAPY: CPT

## 2018-05-07 PROCEDURE — 97110 THERAPEUTIC EXERCISES: CPT

## 2018-05-07 PROCEDURE — 94668 MNPJ CHEST WALL SBSQ: CPT

## 2018-05-07 PROCEDURE — 94664 DEMO&/EVAL PT USE INHALER: CPT

## 2018-05-07 PROCEDURE — 97530 THERAPEUTIC ACTIVITIES: CPT

## 2018-05-07 PROCEDURE — 99232 SBSQ HOSP IP/OBS MODERATE 35: CPT | Performed by: FAMILY MEDICINE

## 2018-05-07 PROCEDURE — 99233 SBSQ HOSP IP/OBS HIGH 50: CPT | Performed by: INTERNAL MEDICINE

## 2018-05-07 PROCEDURE — 94640 AIRWAY INHALATION TREATMENT: CPT

## 2018-05-07 PROCEDURE — 94760 N-INVAS EAR/PLS OXIMETRY 1: CPT

## 2018-05-07 RX ADMIN — DOCUSATE SODIUM 100 MG: 100 CAPSULE, LIQUID FILLED ORAL at 17:35

## 2018-05-07 RX ADMIN — PRAMIPEXOLE DIHYDROCHLORIDE 0.12 MG: 0.25 TABLET ORAL at 15:50

## 2018-05-07 RX ADMIN — BENZONATATE 100 MG: 100 CAPSULE ORAL at 15:49

## 2018-05-07 RX ADMIN — IPRATROPIUM BROMIDE 0.5 MG: 0.5 SOLUTION RESPIRATORY (INHALATION) at 19:29

## 2018-05-07 RX ADMIN — BENZONATATE 100 MG: 100 CAPSULE ORAL at 09:55

## 2018-05-07 RX ADMIN — GUAIFENESIN 1200 MG: 600 TABLET, EXTENDED RELEASE ORAL at 20:23

## 2018-05-07 RX ADMIN — PRIMIDONE 50 MG: 50 TABLET ORAL at 09:55

## 2018-05-07 RX ADMIN — LEVALBUTEROL HYDROCHLORIDE 1.25 MG: 1.25 SOLUTION, CONCENTRATE RESPIRATORY (INHALATION) at 13:25

## 2018-05-07 RX ADMIN — BENZONATATE 100 MG: 100 CAPSULE ORAL at 20:23

## 2018-05-07 RX ADMIN — LEVALBUTEROL HYDROCHLORIDE 1.25 MG: 1.25 SOLUTION, CONCENTRATE RESPIRATORY (INHALATION) at 07:40

## 2018-05-07 RX ADMIN — PRIMIDONE 50 MG: 50 TABLET ORAL at 20:23

## 2018-05-07 RX ADMIN — PRAMIPEXOLE DIHYDROCHLORIDE 0.12 MG: 0.25 TABLET ORAL at 20:23

## 2018-05-07 RX ADMIN — PREDNISONE 40 MG: 20 TABLET ORAL at 09:55

## 2018-05-07 RX ADMIN — BUDESONIDE 0.5 MG: 0.5 INHALANT RESPIRATORY (INHALATION) at 07:40

## 2018-05-07 RX ADMIN — Medication 250 MG: at 17:35

## 2018-05-07 RX ADMIN — IPRATROPIUM BROMIDE 0.5 MG: 0.5 SOLUTION RESPIRATORY (INHALATION) at 02:31

## 2018-05-07 RX ADMIN — LEVOTHYROXINE SODIUM 75 MCG: 75 TABLET ORAL at 05:59

## 2018-05-07 RX ADMIN — LEVALBUTEROL HYDROCHLORIDE 1.25 MG: 1.25 SOLUTION, CONCENTRATE RESPIRATORY (INHALATION) at 19:29

## 2018-05-07 RX ADMIN — GUAIFENESIN 1200 MG: 600 TABLET, EXTENDED RELEASE ORAL at 09:55

## 2018-05-07 RX ADMIN — IPRATROPIUM BROMIDE 0.5 MG: 0.5 SOLUTION RESPIRATORY (INHALATION) at 07:40

## 2018-05-07 RX ADMIN — LEVALBUTEROL HYDROCHLORIDE 1.25 MG: 1.25 SOLUTION, CONCENTRATE RESPIRATORY (INHALATION) at 02:31

## 2018-05-07 RX ADMIN — IPRATROPIUM BROMIDE 0.5 MG: 0.5 SOLUTION RESPIRATORY (INHALATION) at 13:24

## 2018-05-07 RX ADMIN — PRAMIPEXOLE DIHYDROCHLORIDE 0.12 MG: 0.25 TABLET ORAL at 09:55

## 2018-05-07 RX ADMIN — PANTOPRAZOLE SODIUM 40 MG: 40 TABLET, DELAYED RELEASE ORAL at 05:59

## 2018-05-07 RX ADMIN — ENOXAPARIN SODIUM 40 MG: 40 INJECTION SUBCUTANEOUS at 09:55

## 2018-05-07 RX ADMIN — Medication 250 MG: at 09:55

## 2018-05-07 RX ADMIN — BUDESONIDE 0.5 MG: 0.5 INHALANT RESPIRATORY (INHALATION) at 19:29

## 2018-05-07 NOTE — SOCIAL WORK
CM was notified that patient will be able to go to [facility] tomorrow (5/8/2018) because they are waiting on d/c   CM called [facility] and set up d/c for 2pm via wheelchair van  CM will inform the family, doctor , and nurse  No medical necessity form is needed for wheelchair [facility] transport

## 2018-05-07 NOTE — ASSESSMENT & PLAN NOTE
Suspect acute viral infection/bronchitis with underlying COPD,   h/o tobacco use from age [age], less then a pack/day  Finished the course of antibiotics  Continue with the steroids- finish  5 day course, day #3  Pulmonology on board

## 2018-05-07 NOTE — PROGRESS NOTES
Progress Note - Padma Rebollar 1926, 80 y o  female MRN: [id number]    Unit/Bed#: -01 Encounter: [id number]    Primary Care Provider: Ivonne Wilde DO   Date and time admitted to hospital: 2018  2:53 PM        Hypothyroid   Assessment & Plan    Continue levothyroxine        Venous insufficiency of both lower extremities   Assessment & Plan    Continue home dose of lasix        Restless leg   Assessment & Plan    Continue mirapex        Benign essential tremor   Assessment & Plan    Continue primidone        Scoliosis   Assessment & Plan    Known diagnosis  Likely contributing to shortness of breath/hypoxia given its severity        * Asthmatic bronchitis   Assessment & Plan    Suspect acute viral infection/bronchitis with underlying COPD,   h/o tobacco use from age [age], less then a pack/day  Finished the course of antibiotics  Continue with the steroids- finish  5 day course, day #3  Pulmonology on board              VTE Pharmacologic Prophylaxis:   Pharmacologic: Enoxaparin (Lovenox)  Mechanical VTE Prophylaxis in Place: Yes    Patient Centered Rounds: I have performed bedside rounds with nursing staff today  Discussions with Specialists or Other Care Team Provider:     Education and Discussions with Family / Patient:     Time Spent for Care: 30 minutes  More than 50% of total time spent on counseling and coordination of care as described above      Current Length of Stay: 7 day(s)    Current Patient Status: Inpatient   Certification Statement: The patient will continue to require additional inpatient hospital stay due to pending placenment tomorrow    Discharge Plan:     Code Status: Level 3 - DNAR and DNI      Subjective:   Patient seen and examined, still feels tired for discharge to rehab tomorrow    Objective:     Vitals:   Temp (24hrs), Av 6 °F (37 °C), Min:98 2 °F (36 8 °C), Max:98 9 °F (37 2 °C)    HR:  [82-92] 82  Resp:  [18-19] 19  BP: (103-119)/(55-62) 105/55  SpO2:  [90 %-96 %] 91 %  Body mass index is 31 2 kg/m²  Input and Output Summary (last 24 hours): Intake/Output Summary (Last 24 hours) at 05/07/18 1901  Last data filed at 05/07/18 1413   Gross per 24 hour   Intake              400 ml   Output              400 ml   Net                0 ml       Physical Exam:     Physical Exam   Constitutional: She appears well-developed and well-nourished  HENT:   Head: Normocephalic and atraumatic  Eyes: EOM are normal  Pupils are equal, round, and reactive to light  Neck: Normal range of motion  Neck supple  Cardiovascular: Normal rate and regular rhythm  No murmur heard  Pulmonary/Chest: Effort normal  No respiratory distress  She has no wheezes  Scoliosis present   Abdominal: Soft  Bowel sounds are normal  She exhibits no distension  There is no tenderness  Musculoskeletal: Normal range of motion  She exhibits no edema  Neurological: She is alert  No cranial nerve deficit  Skin: Skin is warm and dry  No erythema  Additional Data:     Labs:      Results from last 7 days  Lab Units 05/01/18  0458   WBC Thousand/uL 6 54   HEMOGLOBIN g/dL 13 1   HEMATOCRIT % 41 5   PLATELETS Thousands/uL 280   NEUTROS PCT % 75   LYMPHS PCT % 15   MONOS PCT % 10   EOS PCT % 0       Results from last 7 days  Lab Units 05/01/18  0458   SODIUM mmol/L 137   POTASSIUM mmol/L 4 2   CHLORIDE mmol/L 97*   CO2 mmol/L 37*   BUN mg/dL 16   CREATININE mg/dL 0 53*   CALCIUM mg/dL 8 8   GLUCOSE RANDOM mg/dL 125           * I Have Reviewed All Lab Data Listed Above  * Additional Pertinent Lab Tests Reviewed:  [facility]    Imaging:    Imaging Reports Reviewed Today Include:   Imaging Personally Reviewed by Myself Includes:     Recent Cultures (last 7 days):       Results from last 7 days  Lab Units 05/02/18  1444 04/30/18  2144   SPUTUM CULTURE   --  Culture too young- will reincubate   GRAM STAIN RESULT   --  2+ Epithelial cells per low power field Rare Polys  2+ Gram positive cocci in pairs  Rare Gram negative rods   INFLUENZA A PCR  None Detected  --    INFLUENZA B PCR  None Detected  --    RSV PCR  None Detected  --        Last 24 Hours Medication List:     Current Facility-Administered Medications:  benzonatate 100 mg Oral TID Candance Brownie, CRNP   budesonide 0 5 mg Nebulization Q12H ARNIE Malhotra   docusate sodium 100 mg Oral BID Marin Clement MD   enoxaparin 40 mg Subcutaneous Daily Marin Clement MD   furosemide 40 mg Oral BID Marin Clement MD   guaiFENesin 1,200 mg Oral Q12H [address] Patricia Blum MD   ipratropium 0 5 mg Nebulization Q6H Patricia Blum MD   levalbuterol 1 25 mg Nebulization Q6H Adriane Larsen MD   levothyroxine 75 mcg Oral Early Morning Marin Clement MD   nitroglycerin 0 4 mg Sublingual Q5 Min PRN Pallavi Vann PA-C   pantoprazole 40 mg Oral Early Morning Candance Brownie, CRNP   pramipexole 0 125 mg Oral TID Marin Clement MD   predniSONE 40 mg Oral Daily Bhavana Gerber MD   primidone 50 mg Oral Q12H Frannie Hyatt MD   saccharomyces boulardii 250 mg Oral BID Marin Clement MD   traMADol 50 mg Oral TID Marin Clement MD        Today, Patient Was Seen By: Adriane Larsen MD    ** Please Note: Dictation voice to text software may have been used in the creation of this document   **

## 2018-05-07 NOTE — PLAN OF CARE
Problem: DISCHARGE PLANNING - CARE MANAGEMENT  Goal: Discharge to post-acute care or home with appropriate resources  INTERVENTIONS:  - Conduct assessment to determine patient/family and health care team treatment goals, and need for post-acute services based on payer coverage, community resources, and patient preferences, and barriers to discharge  - Address psychosocial, clinical, and financial barriers to discharge as identified in assessment in conjunction with the patient/family and health care team  - Arrange appropriate level of post-acute services according to patients   needs and preference and payer coverage in collaboration with the physician and health care team  - Communicate with and update the patient/family, physician, and health care team regarding progress on the discharge plan  - Arrange appropriate transportation to post-acute venues   Outcome: Progressing  CM received a call from patient son in law, Libby November stating that the family visited [facility] , [address], and [address] yesterday and would prefer that the patient was sent to [facility]  CM placed a referral through [facility] and will wait for a response  CM was also asked by family that patient be transported by wheelchair van at d/c  CM will continue to follow until d/c

## 2018-05-07 NOTE — PROGRESS NOTES
Progress Note - Pulmonary   Phyllistine Pounds 80 y o  female MRN: [id number]  Unit/Bed#: -01 Encounter: [id number]    Assessment/Plan:  Acute hypoxic respiratory failure due to asthmatic bronchitis, likely viral in etiology - improved              Titrate oxygen to maintain saturations greater than or equal to 88%  Check ambulatory pulse oximetry to determine need of supplemental oxygen upon discharge              Out of bed as tolerated               Continue flutter valve and incentive spirometer  complete course of doxycycline              Continue PO prednisone 40mg x 5 days total course                Continue Xopenex/Atrovent every 6 hours and b i d  Pulmicort                             -  She will need to rinse her mouth after use of this      Intractable cough due to above - resolved              Scheduled Tessalon and continue Mucinex               Continue PPI for GERD as potential aggravating factor      Dysphagia              Speech evaluation noted               Least restrictive diet and aspiration precautions  Chief Complaint:   cough    Subjective:   No acute events overnight  Patient is resting comfortably  Reports cough has improved  Reports shortness of breath has improved as well  Denies fevers, chills, nausea or vomiting  Does not enjoy the food here  Objective:     Vitals: Blood pressure 107/58, pulse 82, temperature 98 6 °F (37 °C), temperature source Oral, resp  rate 19, height 4' 9" (1 448 m), weight 65 4 kg (144 lb 2 9 oz), SpO2 90 %  ,Body mass index is 31 2 kg/m²      No intake or output data in the 24 hours ending 05/07/18 1534    Invasive Devices     Peripheral Intravenous Line            Peripheral IV 05/04/18 Left Forearm 3 days                Physical Exam:   -  Sitting in chair  -  Awake/alert/appropriate  -  RRR  -  Crackles at bilateral bases  -  Soft nt/nd; nabs  -  No c/c/e  -  Warm well perfused

## 2018-05-07 NOTE — SOCIAL WORK
CM received a call from patient son in , Edmund Su stating that the family visited [facility] , [facility], and [facility] [address]  yesterday and would prefer that the patient was sent to [facility]  CM placed a referral through [facility] and will wait for a response  CM was also asked by family that patient be transported by wheelchair van at d/c  CM will continue to follow until d/c

## 2018-05-07 NOTE — PLAN OF CARE
Problem: PHYSICAL THERAPY ADULT  Goal: Performs mobility at highest level of function for planned discharge setting  See evaluation for individualized goals  Treatment/Interventions: Functional transfer training, LE strengthening/ROM, Therapeutic exercise, Endurance training, Patient/family training, Equipment eval/education, Bed mobility, Gait training  Equipment Recommended: Other (Comment) (roller walker)       See flowsheet documentation for full assessment, interventions and recommendations  Outcome: Progressing  Prognosis: Fair  Problem List: Decreased strength, Decreased endurance, Decreased range of motion, Impaired balance, Decreased mobility, Decreased coordination, Decreased safety awareness, Impaired hearing  Assessment: Patient remains min a for all transfers with verbal instruction for hand placement and to control descent into chair  Patient demonstrated ability to ambulate 20 feet x 2 trials with 3-4 seated rest break in between  Provided verbal instruction for safe hand placement as well as for breathing technique  Patient with SOB during activity on 1L O2, SpO2 above 90% with good recovery after breathing exercises  Recommendation: Post acute IP rehab          See flowsheet documentation for full assessment

## 2018-05-07 NOTE — PHYSICAL THERAPY NOTE
PHYSICAL THERAPY NOTE      Patient Name: Nellie Lane  VTYTP'T Date: 5/7/2018 05/07/18 1413   Pain Assessment   Pain Assessment No/denies pain   Pain Score No Pain   Restrictions/Precautions   Other Precautions Chair Alarm;O2;Fall Risk;Hard of hearing   General   Family/Caregiver Present No   Subjective   Subjective Patient seated in recliner on 1L O2 via nc upon entry to room and is agreeable to therapy session  Bed Mobility   Supine to Sit Unable to assess   Sit to Supine Unable to assess   Additional Comments Patient seated OOB pre and post session with chair alarm intact and call bell and belongings in reach  Transfers   Sit to Stand 4  Minimal assistance   Additional items Assist x 1; Armrests; Increased time required;Verbal cues   Stand to Sit 4  Minimal assistance   Additional items Assist x 1; Armrests; Increased time required;Verbal cues   Ambulation/Elevation   Gait pattern R Foot drag;R Knee Ryanne; Shuffling; Inconsistent gretchen; Foward flexed; Excessively slow   Gait Assistance 4  Minimal assist   Additional items Assist x 1;Verbal cues  (breathing technique and RW positioning)   Assistive Device Rolling walker   Distance 20 feet x 2   Balance   Static Sitting Fair   Dynamic Sitting Poor +   Static Standing Fair -   Dynamic Standing Poor   Ambulatory Poor  (with roller walker)   Activity Tolerance   Activity Tolerance Patient limited by fatigue   Nurse Made Aware spoke to Charles Mitchell    Exercises   Glute Sets Sitting;10 reps;AROM;20 reps   Hip Flexion Sitting;10 reps;AROM; Bilateral  (x 2 sets)   Hip Adduction Sitting;10 reps;AROM; Bilateral  (x 2 sets)   Knee AROM Long Arc Quad Sitting;10 reps;AROM; Bilateral  (x 2 sets)   Ankle Pumps Sitting;20 reps;AROM; Bilateral   Assessment   Prognosis Fair   Problem List Decreased strength;Decreased endurance;Decreased range of motion; Impaired balance;Decreased mobility; Decreased coordination;Decreased safety awareness; Impaired hearing   Assessment Patient remains min a for all transfers with verbal instruction for hand placement and to control descent into chair  Patient demonstrated ability to ambulate 20 feet x 2 trials with 3-4 seated rest break in between  Provided verbal instruction for safe hand placement as well as for breathing technique  Patient with SOB during activity on 1L O2, SpO2 above 90% with good recovery after breathing exercises  Goals   Patient Goals to get better   STG Expiration Date 05/13/18   Short Term Goal #1 Per last PT goal assessment  Sherre Soulier Sherre Soulier pt will: Increase bilateral LE strength 1/2 grade to facilitate independent mobility, Perform all bed mobility tasks w/ supervision to decrease fall risk factors, Perform all transfers w/ supervision to improve independence, Ambulate 150 ft  with roller walker w/ supervision w/o LOB, Increase ambulatory balance 1 grade to decrease risk for falls, Complete exercise program independently, Tolerate 3 hr OOB to faciliate upright tolerance and Improve Barthel Index score to 65 or greater to facilitate independence  Treatment Day 2   Plan   Treatment/Interventions Functional transfer training;LE strengthening/ROM; Therapeutic exercise; Endurance training;Patient/family training;Equipment eval/education; Bed mobility;Gait training   PT Frequency 5x/wk   Recommendation   Recommendation Post acute IP rehab   Equipment Recommended Other (Comment)  (roller walker)       Donte Hilario, LAKHWINDER

## 2018-05-07 NOTE — PLAN OF CARE
Problem: DISCHARGE PLANNING - CARE MANAGEMENT  Goal: Discharge to post-acute care or home with appropriate resources  INTERVENTIONS:  - Conduct assessment to determine patient/family and health care team treatment goals, and need for post-acute services based on payer coverage, community resources, and patient preferences, and barriers to discharge  - Address psychosocial, clinical, and financial barriers to discharge as identified in assessment in conjunction with the patient/family and health care team  - Arrange appropriate level of post-acute services according to patients   needs and preference and payer coverage in collaboration with the physician and health care team  - Communicate with and update the patient/family, physician, and health care team regarding progress on the discharge plan  - Arrange appropriate transportation to post-acute venues   Outcome: Progressing  CM was notified that patient will be able to go to [facility] tomorrow (5/8/2018) because they are waiting on d/c   CM called [facility] and set up d/c for 2pm via wheelchair van  CM will inform the family, doctor , and nurse  No medical necessity form is needed [facility]

## 2018-05-08 VITALS
TEMPERATURE: 97.6 F | HEART RATE: 76 BPM | SYSTOLIC BLOOD PRESSURE: 132 MMHG | BODY MASS INDEX: 31.11 KG/M2 | DIASTOLIC BLOOD PRESSURE: 66 MMHG | RESPIRATION RATE: 19 BRPM | WEIGHT: 144.18 LBS | OXYGEN SATURATION: 94 % | HEIGHT: 57 IN

## 2018-05-08 PROCEDURE — 94760 N-INVAS EAR/PLS OXIMETRY 1: CPT

## 2018-05-08 PROCEDURE — 99239 HOSP IP/OBS DSCHRG MGMT >30: CPT | Performed by: HOSPITALIST

## 2018-05-08 PROCEDURE — 94640 AIRWAY INHALATION TREATMENT: CPT

## 2018-05-08 RX ORDER — BUDESONIDE 0.5 MG/2ML
0.5 INHALANT ORAL
Qty: 60 ML | Refills: 0 | Status: SHIPPED | OUTPATIENT
Start: 2018-05-08 | End: 2019-01-01

## 2018-05-08 RX ORDER — PREDNISONE 20 MG/1
40 TABLET ORAL DAILY
Qty: 4 TABLET | Refills: 0 | Status: SHIPPED | OUTPATIENT
Start: 2018-05-09 | End: 2019-01-01

## 2018-05-08 RX ORDER — LEVALBUTEROL 1.25 MG/.5ML
1.25 SOLUTION, CONCENTRATE RESPIRATORY (INHALATION) EVERY 6 HOURS PRN
Qty: 1 EACH | Refills: 0 | Status: SHIPPED | OUTPATIENT
Start: 2018-05-08 | End: 2019-01-01

## 2018-05-08 RX ORDER — PANTOPRAZOLE SODIUM 40 MG/1
40 TABLET, DELAYED RELEASE ORAL
Qty: 30 TABLET | Refills: 0 | Status: SHIPPED | OUTPATIENT
Start: 2018-05-09 | End: 2019-01-01

## 2018-05-08 RX ORDER — GUAIFENESIN 1200 MG/1
1200 TABLET, EXTENDED RELEASE ORAL EVERY 12 HOURS SCHEDULED
Qty: 30 TABLET | Refills: 0 | Status: SHIPPED | OUTPATIENT
Start: 2018-05-08 | End: 2019-01-01

## 2018-05-08 RX ORDER — NITROGLYCERIN 0.4 MG/1
0.4 TABLET SUBLINGUAL
Qty: 90 TABLET | Refills: 0 | Status: SHIPPED | OUTPATIENT
Start: 2018-05-08 | End: 2019-01-01

## 2018-05-08 RX ADMIN — BENZONATATE 100 MG: 100 CAPSULE ORAL at 08:53

## 2018-05-08 RX ADMIN — PRIMIDONE 50 MG: 50 TABLET ORAL at 08:53

## 2018-05-08 RX ADMIN — DOCUSATE SODIUM 100 MG: 100 CAPSULE, LIQUID FILLED ORAL at 08:52

## 2018-05-08 RX ADMIN — LEVALBUTEROL HYDROCHLORIDE 1.25 MG: 1.25 SOLUTION, CONCENTRATE RESPIRATORY (INHALATION) at 03:07

## 2018-05-08 RX ADMIN — GUAIFENESIN 1200 MG: 600 TABLET, EXTENDED RELEASE ORAL at 08:53

## 2018-05-08 RX ADMIN — FUROSEMIDE 40 MG: 40 TABLET ORAL at 08:53

## 2018-05-08 RX ADMIN — LEVALBUTEROL HYDROCHLORIDE 1.25 MG: 1.25 SOLUTION, CONCENTRATE RESPIRATORY (INHALATION) at 08:24

## 2018-05-08 RX ADMIN — IPRATROPIUM BROMIDE 0.5 MG: 0.5 SOLUTION RESPIRATORY (INHALATION) at 03:07

## 2018-05-08 RX ADMIN — PRAMIPEXOLE DIHYDROCHLORIDE 0.12 MG: 0.25 TABLET ORAL at 08:53

## 2018-05-08 RX ADMIN — LEVOTHYROXINE SODIUM 75 MCG: 75 TABLET ORAL at 06:25

## 2018-05-08 RX ADMIN — ENOXAPARIN SODIUM 40 MG: 40 INJECTION SUBCUTANEOUS at 08:53

## 2018-05-08 RX ADMIN — Medication 250 MG: at 08:53

## 2018-05-08 RX ADMIN — BUDESONIDE 0.5 MG: 0.5 INHALANT RESPIRATORY (INHALATION) at 08:24

## 2018-05-08 RX ADMIN — PREDNISONE 40 MG: 20 TABLET ORAL at 08:53

## 2018-05-08 RX ADMIN — IPRATROPIUM BROMIDE 0.5 MG: 0.5 SOLUTION RESPIRATORY (INHALATION) at 08:24

## 2018-05-08 RX ADMIN — PANTOPRAZOLE SODIUM 40 MG: 40 TABLET, DELAYED RELEASE ORAL at 06:25

## 2018-05-08 NOTE — ASSESSMENT & PLAN NOTE
· Continue with the supplemental oxygen to keep saturation above 90%  · Resolved    · Add incentive spirometry  · Chest x-ray reviewed  · Discussed with the patient about the importance of doing the incentive spirometry, wean off of the oxygen as tolerated

## 2018-05-08 NOTE — ASSESSMENT & PLAN NOTE
Suspect acute viral infection/bronchitis with underlying COPD,   h/o tobacco use from age [age], less then a pack/day  Finished the course of antibiotics  Continue with the steroids- finish  5 day course, day #4  Pulmonology on board

## 2018-05-08 NOTE — PLAN OF CARE
Problem: DISCHARGE PLANNING - CARE MANAGEMENT  Goal: Discharge to post-acute care or home with appropriate resources  INTERVENTIONS:  - Conduct assessment to determine patient/family and health care team treatment goals, and need for post-acute services based on payer coverage, community resources, and patient preferences, and barriers to discharge  - Address psychosocial, clinical, and financial barriers to discharge as identified in assessment in conjunction with the patient/family and health care team  - Arrange appropriate level of post-acute services according to patients   needs and preference and payer coverage in collaboration with the physician and health care team  - Communicate with and update the patient/family, physician, and health care team regarding progress on the discharge plan  - Arrange appropriate transportation to post-acute venues   Outcome: Completed Date Met: 05/08/18  Patient is medically stable for discharge to [facility] today for STR  Patient has been set up [address] [facility] via [facility] for 2:00 pm as facility requested afternoon admission  IMM was reviewed with patient's family at bedside  CM reviewed the availability of treatment team to discuss questions or concerns patient and/or family may have regarding  understanding medications and recognizing signs and symptoms once discharged  CM also encouraged patient to follow up with all recommended appointments after discharge  Patient advised of importance for patient and family to participate in managing patients medical well being

## 2018-05-08 NOTE — DISCHARGE SUMMARY
Discharge- Skyla Michael 9/14/1926, 80 y o  female MRN: [id number]    Unit/Bed#: -01 Encounter: [id number]    Primary Care Provider: Esau Herr DO   Date and time admitted to hospital: 4/30/2018  2:53 PM        Acute respiratory failure with hypoxia [facility])   Assessment & Plan    · Continue with the supplemental oxygen to keep saturation above 90%  · Resolved    · Add incentive spirometry  · Chest x-ray reviewed  · Discussed with the patient about the importance of doing the incentive spirometry, wean off of the oxygen as tolerated        Hypothyroid   Assessment & Plan    Continue levothyroxine        Venous insufficiency of both lower extremities   Assessment & Plan    Continue home dose of lasix        Restless leg   Assessment & Plan    Continue mirapex        Benign essential tremor   Assessment & Plan    Continue primidone        Scoliosis   Assessment & Plan    Known diagnosis  Likely contributing to shortness of breath/hypoxia given its severity        * Asthmatic bronchitis   Assessment & Plan    Suspect acute viral infection/bronchitis with underlying COPD,   h/o tobacco use from age [age], less then a pack/day  Finished the course of antibiotics  Continue with the steroids- finish  5 day course, day #4  Pulmonology on board                Discharging Physician / Practitioner: Harsh Hagan MD  PCP: Esau Herr DO  Admission Date:   Admission Orders     Ordered        04/30/18 1700  Inpatient Admission (expected length of stay for this patient is greater than two midnights)  Once             Discharge Date: 05/08/18    Resolved Problems  Date Reviewed: 5/8/2018    None          Consultations During Hospital Stay:  · Pulmonary  · PT/OT    Procedures Performed:     · none    Significant Findings / Test Results:     · 5/6 CXR:   Bibasilar opacities are unchanged, likely atelectasis and/or small pleural effusions    Incidental Findings:   · none    Test Results Pending at Discharge (will require follow up):   · none     Outpatient Tests Requested:  · none    Complications:  none    Reason for Admission: shortness of breath  Hospital Course: Tanner Holley is a 80 y o  female patient who originally presented to the hospital on 4/30/2018 due to shortness of breath  Patient developed hypoxic respiratory failure, improved with IV steroid medications  She was found to have acute bronchitis compounding her underlying COPD  She was treated with doxycycline, solumedrol and nebulization treatments as scheduled  Other comorbidities such as hypothyroidism, venous insufficiency of legs, essential tremor were all managed accordingly  P[place] was seen by pulmonary medicine, who recommended that O2 sat be maintained >88%, prednisone for 5 days, wit Xopenex/atrovent q6h and pulmicort bid  Please see above list of diagnoses and related plan for additional information  Condition at Discharge: good     Discharge Day Visit / Exam:     Subjective:  No complaints today, wheezing resolved  Vitals: Blood Pressure: 132/66 (05/08/18 0721)  Pulse: 76 (05/08/18 0721)  Temperature: 97 6 °F (36 4 °C) (05/08/18 0721)  Temp Source: Oral (05/08/18 0721)  Respirations: 19 (05/08/18 0721)  Height: 4' 9" (144 8 cm) (04/30/18 1900)  Weight - Scale: 65 4 kg (144 lb 2 9 oz) (04/30/18 1506)  SpO2: 94 % (05/08/18 0824)  Exam:   Physical Exam   Constitutional: She is oriented to person, place, and time  No distress  HENT:   Head: Normocephalic and atraumatic  Mouth/Throat: Oropharynx is clear and moist    Eyes: EOM are normal  Pupils are equal, round, and reactive to light  Neck: Normal range of motion  No JVD present  Cardiovascular: Normal rate  Exam reveals no gallop and no friction rub  No murmur heard  Pulmonary/Chest: Effort normal and breath sounds normal  No respiratory distress  She has no wheezes  She has no rales  Abdominal: Soft  Bowel sounds are normal  She exhibits no distension   There is no tenderness  There is no rebound and no guarding  Musculoskeletal: Normal range of motion  She exhibits no edema, tenderness or deformity  Neurological: She is alert and oriented to person, place, and time  No cranial nerve deficit  Coordination normal    Skin: Skin is warm  No erythema  Psychiatric: She has a normal mood and affect  Her behavior is normal        Discussion with Family: yes    Discharge instructions/Information to patient and family:   See after visit summary for information provided to patient and family  Provisions for Follow-Up Care:  See after visit summary for information related to follow-up care and any pertinent home health orders  Disposition:     ruslan oliveros     For Discharges to [facility] SNF:   · Not Applicable to this Patient - Not Applicable to this Patient    Planned Readmission: none     Discharge Statement:  I spent 35 minutes discharging the patient  This time was spent on the day of discharge  I had direct contact with the patient on the day of discharge  Greater than 50% of the total time was spent examining patient, answering all patient questions, arranging and discussing plan of care with patient as well as directly providing post-discharge instructions  Additional time then spent on discharge activities  Discharge Medications:  See after visit summary for reconciled discharge medications provided to patient and family        ** Please Note: This note has been constructed using a voice recognition system **

## 2018-05-08 NOTE — SOCIAL WORK
Patient is medically stable for discharge to [facility] today for STR  Patient has been set up [facility] via [facility] for 2:00 pm as facility requested afternoon admission  IMM was reviewed with patient's family at bedside  CM reviewed the availability of treatment team to discuss questions or concerns patient and/or family may have regarding  understanding medications and recognizing signs and symptoms once discharged  CM also encouraged patient to follow up with all recommended appointments after discharge  Patient advised of importance for patient and family to participate in managing patients medical well being

## 2018-05-16 ENCOUNTER — OFFICE VISIT (OUTPATIENT)
Dept: PULMONOLOGY | Facility: CLINIC | Age: 83
End: 2018-05-16
Payer: MEDICARE

## 2018-05-16 VITALS
WEIGHT: 137.4 LBS | TEMPERATURE: 98.6 F | HEIGHT: 57 IN | BODY MASS INDEX: 29.64 KG/M2 | SYSTOLIC BLOOD PRESSURE: 110 MMHG | OXYGEN SATURATION: 95 % | RESPIRATION RATE: 16 BRPM | DIASTOLIC BLOOD PRESSURE: 60 MMHG | HEART RATE: 76 BPM

## 2018-05-16 DIAGNOSIS — J30.9 ALLERGIC RHINITIS: Primary | ICD-10-CM

## 2018-05-16 DIAGNOSIS — J45.41 MODERATE PERSISTENT ASTHMATIC BRONCHITIS WITH ACUTE EXACERBATION: ICD-10-CM

## 2018-05-16 DIAGNOSIS — J96.01 ACUTE RESPIRATORY FAILURE WITH HYPOXIA (HCC): ICD-10-CM

## 2018-05-16 PROCEDURE — 99214 OFFICE O/P EST MOD 30 MIN: CPT | Performed by: PHYSICIAN ASSISTANT

## 2018-05-16 RX ORDER — FLUTICASONE PROPIONATE 50 MCG
1 SPRAY, SUSPENSION (ML) NASAL DAILY
Qty: 16 G | Refills: 0 | Status: SHIPPED | OUTPATIENT
Start: 2018-05-16 | End: 2019-01-01

## 2018-05-16 RX ORDER — CETIRIZINE HYDROCHLORIDE 10 MG/1
5 TABLET, CHEWABLE ORAL DAILY
Qty: 30 TABLET | Refills: 0 | Status: SHIPPED | OUTPATIENT
Start: 2018-05-16 | End: 2019-01-01

## 2018-05-16 NOTE — PROGRESS NOTES
Pulmonary Follow Up Note   Padma Rebollar 80 y o  female MRN: [id number]  5/16/2018      Assessment:    Allergic rhinitis  1  Start Zyrtec  2  Start Flonase    Acute respiratory failure with hypoxia (HCC)  3  SPO2 Checked on RA ans was 93%  No need for oxygen at rest however I did encourage her to use with exertion     Asthmatic bronchitis  4  Continue Duoneb PRN   5  Continue Pulmicort BID   6  Continue to taper prednisone as prescribed at OR from hospital until completion       Plan:    Diagnoses and all orders for this visit:    Allergic rhinitis  -     fluticasone (FLONASE) 50 mcg/act nasal spray; 1 spray into each nostril daily  -     cetirizine (ZyrTEC) 10 MG chewable tablet; Chew 0 5 tablets (5 mg total) daily    Moderate persistent asthmatic bronchitis with acute exacerbation  · Continue PRN Duoneb and BID Pulmicort   · Follow up in the Pulmonary office PRN  She is doing quite well since her hospital stay and I believe her continued symptoms could be related to allergies  She states she will be out of rehab soon and will have her own doctors at the apartment complex she will be residing  She can continue to see us as needed for her Pulmonary needs    Acute respiratory failure with hypoxia ([address] )  · She was able to be weaned to RA at rest with adequate oxygen saturations while here in the office today  She unfortunately is unable to walk or ambulate without a walker and mainly spends her time in a wheel chair  She should continue to use oxygen with exertion (wheeling herself to the dinning room etc ) But when she is at rest she can be without the oxygen  No Follow-up on file  History of Present Illness   HPI:  Padma Rebollar is a 80 y o  female who presents to the office today for hospital follow up  She was recently [place] [place] [facility] for Asthmatic Bronchitis Exacerbation  She was treated with Doxycycline along with prednisone and nebulizers   She was discharged to a [place] [id number] after this hospital stay  She presents today for follow up visit  She is doing quite well  She is still having some post nasal drip along with congestion and fullness in her ears  She is coughing at times but is bringing up white secretions  She denies any fevers, chills, CP, wheezing, chest tightness, changes in sputum quantity/color/or thickness, she is feeling well as she tapers the prednisone  I believe she may be suffering from seasonal allergies  I would like her to start taking flonase and zyrtec  She can continue with the prednisone taper as prescribed, along with the nebulizers  She can wear her oxygen with exertion and has adequate oxygen saturations to be on RA at rest      Review of Systems   Constitutional: Negative for activity change, appetite change, chills, diaphoresis, fatigue, fever and unexpected weight change  HENT: Positive for congestion, postnasal drip and rhinorrhea  Negative for sinus pain, sinus pressure, sneezing, sore throat and trouble swallowing  Respiratory: Positive for cough  Negative for apnea, choking, chest tightness, shortness of breath, wheezing and stridor  Cardiovascular: Negative for chest pain, palpitations and leg swelling  Gastrointestinal: Negative for abdominal distention, abdominal pain, constipation, diarrhea, nausea and vomiting  Musculoskeletal: Negative for arthralgias  Skin: Negative for rash  Allergic/Immunologic: Negative for immunocompromised state  Neurological: Negative for dizziness  Hematological: Negative for adenopathy  Psychiatric/Behavioral: Negative for agitation  All other systems reviewed and are negative  Historical Information   Past Medical History:   Diagnosis Date    Arthritis     Cellulitis     Disease of thyroid gland     Edema     lower ext   Scoliosis      Past Surgical History:   Procedure Laterality Date    HYSTERECTOMY       Family History   Problem Relation Age of Onset    Family history unknown:  Yes Meds/Allergies     Current Outpatient Prescriptions:     Acetaminophen (ARTHRITIS PAIN RELIEVER PO), Take 650 mg by mouth every 6 (six) hours as needed, Disp: , Rfl:     Ascorbic Acid (VITAMIN C) 100 MG tablet, Take 100 mg by mouth daily, Disp: , Rfl:     benzonatate (TESSALON PERLES) 100 mg capsule, Take 100 mg by mouth 3 (three) times a day as needed for cough, Disp: , Rfl:     budesonide (PULMICORT) 0 5 mg/2 mL nebulizer solution, Take 2 mL (0 5 mg total) by nebulization every 12 (twelve) hours, Disp: 60 mL, Rfl: 0    Calcium Carb-Cholecalciferol (CALCIUM+D3) 600-800 MG-UNIT TABS, Take by mouth 2 (two) times a day, Disp: , Rfl:     cefpodoxime (VANTIN) 100 mg tablet, Take 100 mg by mouth 2 (two) times a day, Disp: , Rfl:     Cranberry 125 MG TABS, Take by mouth, Disp: , Rfl:     docusate sodium (COLACE) 100 mg capsule, Take 100 mg by mouth 2 (two) times a day, Disp: , Rfl:     furosemide (LASIX) 40 mg tablet, Take 40 mg by mouth 2 (two) times a day, Disp: , Rfl:     guaiFENesin 1200 MG TB12, Take 1 tablet (1,200 mg total) by mouth every 12 (twelve) hours, Disp: 30 tablet, Rfl: 0    ipratropium (ATROVENT) 0 02 % nebulizer solution, Take 2 5 mL (0 5 mg total) by nebulization every 6 (six) hours, Disp: 75 mL, Rfl: 0    levalbuterol (XOPENEX) 1 25 mg/0 5 mL nebulizer solution, Take 0 5 mL (1 25 mg total) by nebulization every 6 (six) hours as needed for wheezing, Disp: 1 each, Rfl: 0    levothyroxine 75 mcg tablet, Take 75 mcg by mouth daily, Disp: , Rfl:     Liniments (SALONPAS PAIN RELIEF PATCH EX), Apply topically daily as needed, Disp: , Rfl:     nitroglycerin (NITROSTAT) 0 4 mg SL tablet, Place 1 tablet (0 4 mg total) under the tongue every 5 (five) minutes as needed for chest pain, Disp: 90 tablet, Rfl: 0    pantoprazole (PROTONIX) 40 mg tablet, Take 1 tablet (40 mg total) by mouth daily in the early morning, Disp: 30 tablet, Rfl: 0    pramipexole (MIRAPEX) 0 125 mg tablet, Take 0 125 mg by mouth 3 (three) times a day, Disp: , Rfl:     predniSONE 20 mg tablet, Take 2 tablets (40 mg total) by mouth daily, Disp: 4 tablet, Rfl: 0    primidone (MYSOLINE) 50 mg tablet, Take by mouth 2 (two) times a day, Disp: , Rfl:     Probiotic Product ([address]) CAPS, Take by mouth, Disp: , Rfl:     traMADol (ULTRAM) 50 mg tablet, Take 50 mg by mouth 3 (three) times a day, Disp: , Rfl:     cetirizine (ZyrTEC) 10 MG chewable tablet, Chew 0 5 tablets (5 mg total) daily, Disp: 30 tablet, Rfl: 0    fluticasone (FLONASE) 50 mcg/act nasal spray, 1 spray into each nostril daily, Disp: 16 g, Rfl: 0  Allergies   Allergen Reactions    Sulfa Antibiotics        Vitals: Blood pressure 110/60, pulse 76, temperature 98 6 °F (37 °C), temperature source Tympanic, resp  rate 16, height 4' 9" (1 448 m), weight 62 3 kg (137 lb 6 4 oz), SpO2 95 %  Body mass index is 29 73 kg/m²  Physical Exam  Physical Exam   Constitutional: She is oriented to person, place, and time  She appears well-developed and well-nourished  No distress  HENT:   Head: Normocephalic and atraumatic  Nose: Nose normal    Mouth/Throat: No oropharyngeal exudate  Eyes: EOM are normal  Pupils are equal, round, and reactive to light  No scleral icterus  Neck: Normal range of motion  Neck supple  No tracheal deviation present  No thyromegaly present  Cardiovascular: Normal rate, regular rhythm, normal heart sounds and intact distal pulses  Exam reveals no gallop and no friction rub  No murmur heard  Pulmonary/Chest: Effort normal and breath sounds normal  No respiratory distress  She has no wheezes  She has no rales  She exhibits no tenderness  Abdominal: Soft  Bowel sounds are normal  She exhibits no distension  There is no tenderness  Musculoskeletal: Normal range of motion  She exhibits no edema  Neurological: She is alert and oriented to person, place, and time  Skin: Skin is warm and dry  She is not diaphoretic   No erythema  Psychiatric: She has a normal mood and affect  Nursing note and vitals reviewed  Labs: I have personally reviewed pertinent lab results  Lab Results   Component Value Date    WBC 6 54 05/01/2018    HGB 13 1 05/01/2018    HCT 41 5 05/01/2018    MCV 97 05/01/2018     05/01/2018     Lab Results   Component Value Date    GLUCOSE 125 05/01/2018    CALCIUM 8 8 05/01/2018     05/01/2018    K 4 2 05/01/2018    CO2 37 (H) 05/01/2018    CL 97 (L) 05/01/2018    BUN 16 05/01/2018    CREATININE 0 53 (L) 05/01/2018     No results found for: IGE  Lab Results   Component Value Date    ALT 31 04/30/2018    AST 35 04/30/2018    ALKPHOS 82 04/30/2018    BILITOT 0 30 04/30/2018         Imaging and other studies: I have personally reviewed pertinent reports  FINDINGS:     Heart shadow is obscured by adjacent opacity      Bibasilar opacities are unchanged, likely atelectasis and/or small pleural effusions  No pneumothorax or pleural effusion      Dextroscoliosis is unchanged      IMPRESSION:        Bibasilar opacities are unchanged, likely atelectasis and/or small pleural effusions      EKG, Pathology, and Other Studies: I have personally reviewed pertinent reports          Kenyatta Medrano PA-C

## 2018-05-16 NOTE — ASSESSMENT & PLAN NOTE
1  Continue Duoneb PRN   2  Continue Pulmicort BID   3   Continue to taper prednisone as prescribed at [place] from hospital until completion

## 2018-05-16 NOTE — PATIENT INSTRUCTIONS
1  Follow up PRN in the Pulmonary office   2  Start Zyrtec  3  Start Flonase   4  Continue Pulmicort BID and Duoneb PRN   5   Wear oxygen with exertion and you can be on RA at rest

## 2018-05-16 NOTE — ASSESSMENT & PLAN NOTE
1  SPO2 Checked on RA ans was 93%   No need for oxygen at rest however I did encourage her to use with exertion

## 2018-05-23 ENCOUNTER — OFFICE VISIT (OUTPATIENT)
Dept: GERIATRICS | Facility: OTHER | Age: 83
End: 2018-05-23
Payer: MEDICARE

## 2018-05-23 VITALS
TEMPERATURE: 98.4 F | RESPIRATION RATE: 18 BRPM | WEIGHT: 137 LBS | SYSTOLIC BLOOD PRESSURE: 110 MMHG | HEART RATE: 93 BPM | DIASTOLIC BLOOD PRESSURE: 56 MMHG | BODY MASS INDEX: 29.65 KG/M2

## 2018-05-23 DIAGNOSIS — H91.93 DECREASED HEARING OF BOTH EARS: Primary | ICD-10-CM

## 2018-05-23 NOTE — ASSESSMENT & PLAN NOTE
Had cerumen  Ears previously prepped with debrox for one week  Both ear canals cerumen removed via irrigation and lavage under local sterile technique  No complications  Patient tolerated procedure

## 2018-05-23 NOTE — PROGRESS NOTES
Ear cerumen removal  Date/Time: 5/23/2018 4:11 PM  Performed by: Jan Ventura  Authorized by: Jan Ventura     Patient location:  Clinic  Consent:     Consent obtained:  Verbal    Consent given by:  Patient  Procedure details:     Local anesthetic:  None    Location:  L ear and R ear    Procedure type: irrigation      Approach:  External  Post-procedure details:     Complication:  None    Hearing quality:  Diminished    Patient tolerance of procedure: Tolerated well, no immediate complications      Has fluid behind tympanic membrane along with nasal congestion  Suspect contributing to hearing loss  I ordered Flonase nasal spray 1 spray each nostril daily for 10 days as well as loratadine 10 mg p  o  daily for 10 days

## 2019-01-01 ENCOUNTER — HOSPITAL ENCOUNTER (INPATIENT)
Facility: HOSPITAL | Age: 84
LOS: 6 days | DRG: 314 | End: 2019-08-01
Attending: EMERGENCY MEDICINE | Admitting: ANESTHESIOLOGY
Payer: MEDICARE

## 2019-01-01 ENCOUNTER — APPOINTMENT (EMERGENCY)
Dept: RADIOLOGY | Facility: HOSPITAL | Age: 84
DRG: 314 | End: 2019-01-01
Payer: MEDICARE

## 2019-01-01 ENCOUNTER — APPOINTMENT (INPATIENT)
Dept: NON INVASIVE DIAGNOSTICS | Facility: HOSPITAL | Age: 84
DRG: 314 | End: 2019-01-01
Payer: MEDICARE

## 2019-01-01 ENCOUNTER — APPOINTMENT (INPATIENT)
Dept: RADIOLOGY | Facility: HOSPITAL | Age: 84
DRG: 314 | End: 2019-01-01
Payer: MEDICARE

## 2019-01-01 ENCOUNTER — HOSPITAL ENCOUNTER (OUTPATIENT)
Facility: HOSPITAL | Age: 84
End: 2019-08-01
Attending: ANESTHESIOLOGY | Admitting: ANESTHESIOLOGY

## 2019-01-01 VITALS
TEMPERATURE: 97.6 F | BODY MASS INDEX: 30.86 KG/M2 | OXYGEN SATURATION: 93 % | WEIGHT: 147 LBS | RESPIRATION RATE: 25 BRPM | SYSTOLIC BLOOD PRESSURE: 124 MMHG | DIASTOLIC BLOOD PRESSURE: 65 MMHG | HEART RATE: 102 BPM | HEIGHT: 58 IN

## 2019-01-01 VITALS
SYSTOLIC BLOOD PRESSURE: 144 MMHG | HEIGHT: 58 IN | OXYGEN SATURATION: 94 % | WEIGHT: 147.93 LBS | RESPIRATION RATE: 49 BRPM | TEMPERATURE: 97.6 F | HEART RATE: 81 BPM | DIASTOLIC BLOOD PRESSURE: 71 MMHG | BODY MASS INDEX: 31.05 KG/M2

## 2019-01-01 DIAGNOSIS — J44.1 COPD WITH ACUTE EXACERBATION (HCC): ICD-10-CM

## 2019-01-01 DIAGNOSIS — I50.9 CHF (CONGESTIVE HEART FAILURE) (HCC): ICD-10-CM

## 2019-01-01 DIAGNOSIS — R09.02 HYPOXIA: Primary | ICD-10-CM

## 2019-01-01 DIAGNOSIS — I27.20 PULMONARY HYPERTENSION (HCC): ICD-10-CM

## 2019-01-01 LAB
ALBUMIN SERPL BCP-MCNC: 3.3 G/DL (ref 3.5–5)
ALBUMIN SERPL BCP-MCNC: 3.4 G/DL (ref 3.5–5)
ALBUMIN SERPL BCP-MCNC: 3.4 G/DL (ref 3.5–5)
ALP SERPL-CCNC: 63 U/L (ref 46–116)
ALP SERPL-CCNC: 74 U/L (ref 46–116)
ALP SERPL-CCNC: 81 U/L (ref 46–116)
ALT SERPL W P-5'-P-CCNC: 42 U/L (ref 12–78)
ALT SERPL W P-5'-P-CCNC: 42 U/L (ref 12–78)
ALT SERPL W P-5'-P-CCNC: 48 U/L (ref 12–78)
ANION GAP SERPL CALCULATED.3IONS-SCNC: -1 MMOL/L (ref 4–13)
ANION GAP SERPL CALCULATED.3IONS-SCNC: -1 MMOL/L (ref 4–13)
ANION GAP SERPL CALCULATED.3IONS-SCNC: -2 MMOL/L (ref 4–13)
ANION GAP SERPL CALCULATED.3IONS-SCNC: -3 MMOL/L (ref 4–13)
ANION GAP SERPL CALCULATED.3IONS-SCNC: 0 MMOL/L (ref 4–13)
ANION GAP SERPL CALCULATED.3IONS-SCNC: 0 MMOL/L (ref 4–13)
ANION GAP SERPL CALCULATED.3IONS-SCNC: 1 MMOL/L (ref 4–13)
ANION GAP SERPL CALCULATED.3IONS-SCNC: 2 MMOL/L (ref 4–13)
ANION GAP SERPL CALCULATED.3IONS-SCNC: 2 MMOL/L (ref 4–13)
ANION GAP SERPL CALCULATED.3IONS-SCNC: 3 MMOL/L (ref 4–13)
APTT PPP: 29 SECONDS (ref 23–37)
ARTERIAL PATENCY WRIST A: YES
ARTERIAL PATENCY WRIST A: YES
AST SERPL W P-5'-P-CCNC: 24 U/L (ref 5–45)
AST SERPL W P-5'-P-CCNC: 29 U/L (ref 5–45)
AST SERPL W P-5'-P-CCNC: 35 U/L (ref 5–45)
ATRIAL RATE: 94 BPM
BACTERIA BLD CULT: NORMAL
BACTERIA BLD CULT: NORMAL
BACTERIA UR CULT: NORMAL
BACTERIA UR QL AUTO: ABNORMAL /HPF
BASE EXCESS BLDA CALC-SCNC: 4.9 MMOL/L
BASE EXCESS BLDA CALC-SCNC: 9 MMOL/L
BASOPHILS # BLD AUTO: 0.02 THOUSANDS/ΜL (ref 0–0.1)
BASOPHILS # BLD AUTO: 0.03 THOUSANDS/ΜL (ref 0–0.1)
BASOPHILS # BLD AUTO: 0.03 THOUSANDS/ΜL (ref 0–0.1)
BASOPHILS NFR BLD AUTO: 0 % (ref 0–1)
BILIRUB SERPL-MCNC: 0.4 MG/DL (ref 0.2–1)
BILIRUB UR QL STRIP: NEGATIVE
BODY TEMPERATURE: 97.6 DEGREES FEHRENHEIT
BODY TEMPERATURE: 99 DEGREES FEHRENHEIT
BUN SERPL-MCNC: 15 MG/DL (ref 5–25)
BUN SERPL-MCNC: 15 MG/DL (ref 5–25)
BUN SERPL-MCNC: 16 MG/DL (ref 5–25)
BUN SERPL-MCNC: 16 MG/DL (ref 5–25)
BUN SERPL-MCNC: 17 MG/DL (ref 5–25)
BUN SERPL-MCNC: 18 MG/DL (ref 5–25)
BUN SERPL-MCNC: 19 MG/DL (ref 5–25)
BUN SERPL-MCNC: 19 MG/DL (ref 5–25)
BUN SERPL-MCNC: 20 MG/DL (ref 5–25)
BUN SERPL-MCNC: 21 MG/DL (ref 5–25)
BUN SERPL-MCNC: 22 MG/DL (ref 5–25)
BUN SERPL-MCNC: 22 MG/DL (ref 5–25)
CALCIUM SERPL-MCNC: 8 MG/DL (ref 8.3–10.1)
CALCIUM SERPL-MCNC: 8.1 MG/DL (ref 8.3–10.1)
CALCIUM SERPL-MCNC: 8.1 MG/DL (ref 8.3–10.1)
CALCIUM SERPL-MCNC: 8.2 MG/DL (ref 8.3–10.1)
CALCIUM SERPL-MCNC: 8.2 MG/DL (ref 8.3–10.1)
CALCIUM SERPL-MCNC: 8.3 MG/DL (ref 8.3–10.1)
CALCIUM SERPL-MCNC: 8.4 MG/DL (ref 8.3–10.1)
CALCIUM SERPL-MCNC: 8.4 MG/DL (ref 8.3–10.1)
CALCIUM SERPL-MCNC: 8.5 MG/DL (ref 8.3–10.1)
CALCIUM SERPL-MCNC: 8.6 MG/DL (ref 8.3–10.1)
CALCIUM SERPL-MCNC: 8.8 MG/DL (ref 8.3–10.1)
CHLORIDE SERPL-SCNC: 83 MMOL/L (ref 100–108)
CHLORIDE SERPL-SCNC: 85 MMOL/L (ref 100–108)
CHLORIDE SERPL-SCNC: 86 MMOL/L (ref 100–108)
CHLORIDE SERPL-SCNC: 86 MMOL/L (ref 100–108)
CHLORIDE SERPL-SCNC: 87 MMOL/L (ref 100–108)
CHLORIDE SERPL-SCNC: 87 MMOL/L (ref 100–108)
CHLORIDE SERPL-SCNC: 88 MMOL/L (ref 100–108)
CHLORIDE SERPL-SCNC: 89 MMOL/L (ref 100–108)
CHLORIDE SERPL-SCNC: 90 MMOL/L (ref 100–108)
CLARITY UR: ABNORMAL
CO2 SERPL-SCNC: 34 MMOL/L (ref 21–32)
CO2 SERPL-SCNC: 34 MMOL/L (ref 21–32)
CO2 SERPL-SCNC: 37 MMOL/L (ref 21–32)
CO2 SERPL-SCNC: 38 MMOL/L (ref 21–32)
CO2 SERPL-SCNC: 39 MMOL/L (ref 21–32)
CO2 SERPL-SCNC: 39 MMOL/L (ref 21–32)
CO2 SERPL-SCNC: 41 MMOL/L (ref 21–32)
CO2 SERPL-SCNC: 41 MMOL/L (ref 21–32)
CO2 SERPL-SCNC: 42 MMOL/L (ref 21–32)
CO2 SERPL-SCNC: 44 MMOL/L (ref 21–32)
CO2 SERPL-SCNC: 44 MMOL/L (ref 21–32)
COLOR UR: ABNORMAL
CREAT SERPL-MCNC: 0.35 MG/DL (ref 0.6–1.3)
CREAT SERPL-MCNC: 0.38 MG/DL (ref 0.6–1.3)
CREAT SERPL-MCNC: 0.45 MG/DL (ref 0.6–1.3)
CREAT SERPL-MCNC: 0.47 MG/DL (ref 0.6–1.3)
CREAT SERPL-MCNC: 0.48 MG/DL (ref 0.6–1.3)
CREAT SERPL-MCNC: 0.48 MG/DL (ref 0.6–1.3)
CREAT SERPL-MCNC: 0.51 MG/DL (ref 0.6–1.3)
CREAT SERPL-MCNC: 0.51 MG/DL (ref 0.6–1.3)
CREAT SERPL-MCNC: 0.53 MG/DL (ref 0.6–1.3)
CREAT SERPL-MCNC: 0.53 MG/DL (ref 0.6–1.3)
CREAT SERPL-MCNC: 0.54 MG/DL (ref 0.6–1.3)
CREAT SERPL-MCNC: 0.54 MG/DL (ref 0.6–1.3)
CREAT SERPL-MCNC: 0.6 MG/DL (ref 0.6–1.3)
CREAT SERPL-MCNC: 0.61 MG/DL (ref 0.6–1.3)
CREAT SERPL-MCNC: 0.62 MG/DL (ref 0.6–1.3)
CREAT SERPL-MCNC: 0.62 MG/DL (ref 0.6–1.3)
EOSINOPHIL # BLD AUTO: 0.01 THOUSAND/ΜL (ref 0–0.61)
EOSINOPHIL # BLD AUTO: 0.03 THOUSAND/ΜL (ref 0–0.61)
EOSINOPHIL # BLD AUTO: 0.2 THOUSAND/ΜL (ref 0–0.61)
EOSINOPHIL NFR BLD AUTO: 0 % (ref 0–6)
EOSINOPHIL NFR BLD AUTO: 0 % (ref 0–6)
EOSINOPHIL NFR BLD AUTO: 2 % (ref 0–6)
ERYTHROCYTE [DISTWIDTH] IN BLOOD BY AUTOMATED COUNT: 13.6 % (ref 11.6–15.1)
ERYTHROCYTE [DISTWIDTH] IN BLOOD BY AUTOMATED COUNT: 13.6 % (ref 11.6–15.1)
ERYTHROCYTE [DISTWIDTH] IN BLOOD BY AUTOMATED COUNT: 14 % (ref 11.6–15.1)
GFR SERPL CREATININE-BSD FRML MDRD: 79 ML/MIN/1.73SQ M
GFR SERPL CREATININE-BSD FRML MDRD: 82 ML/MIN/1.73SQ M
GFR SERPL CREATININE-BSD FRML MDRD: 82 ML/MIN/1.73SQ M
GFR SERPL CREATININE-BSD FRML MDRD: 83 ML/MIN/1.73SQ M
GFR SERPL CREATININE-BSD FRML MDRD: 83 ML/MIN/1.73SQ M
GFR SERPL CREATININE-BSD FRML MDRD: 84 ML/MIN/1.73SQ M
GFR SERPL CREATININE-BSD FRML MDRD: 84 ML/MIN/1.73SQ M
GFR SERPL CREATININE-BSD FRML MDRD: 85 ML/MIN/1.73SQ M
GFR SERPL CREATININE-BSD FRML MDRD: 85 ML/MIN/1.73SQ M
GFR SERPL CREATININE-BSD FRML MDRD: 86 ML/MIN/1.73SQ M
GFR SERPL CREATININE-BSD FRML MDRD: 87 ML/MIN/1.73SQ M
GFR SERPL CREATININE-BSD FRML MDRD: 92 ML/MIN/1.73SQ M
GFR SERPL CREATININE-BSD FRML MDRD: 95 ML/MIN/1.73SQ M
GLUCOSE SERPL-MCNC: 103 MG/DL (ref 65–140)
GLUCOSE SERPL-MCNC: 104 MG/DL (ref 65–140)
GLUCOSE SERPL-MCNC: 117 MG/DL (ref 65–140)
GLUCOSE SERPL-MCNC: 119 MG/DL (ref 65–140)
GLUCOSE SERPL-MCNC: 121 MG/DL (ref 65–140)
GLUCOSE SERPL-MCNC: 139 MG/DL (ref 65–140)
GLUCOSE SERPL-MCNC: 157 MG/DL (ref 65–140)
GLUCOSE SERPL-MCNC: 163 MG/DL (ref 65–140)
GLUCOSE SERPL-MCNC: 181 MG/DL (ref 65–140)
GLUCOSE SERPL-MCNC: 73 MG/DL (ref 65–140)
GLUCOSE SERPL-MCNC: 79 MG/DL (ref 65–140)
GLUCOSE SERPL-MCNC: 91 MG/DL (ref 65–140)
GLUCOSE SERPL-MCNC: 95 MG/DL (ref 65–140)
GLUCOSE SERPL-MCNC: 95 MG/DL (ref 65–140)
GLUCOSE SERPL-MCNC: 96 MG/DL (ref 65–140)
GLUCOSE SERPL-MCNC: 96 MG/DL (ref 65–140)
GLUCOSE SERPL-MCNC: 97 MG/DL (ref 65–140)
GLUCOSE UR STRIP-MCNC: NEGATIVE MG/DL
HCO3 BLDA-SCNC: 34.8 MMOL/L (ref 22–28)
HCO3 BLDA-SCNC: 37 MMOL/L (ref 22–28)
HCT VFR BLD AUTO: 42.3 % (ref 34.8–46.1)
HCT VFR BLD AUTO: 45.5 % (ref 34.8–46.1)
HCT VFR BLD AUTO: 46.4 % (ref 34.8–46.1)
HGB BLD-MCNC: 13.3 G/DL (ref 11.5–15.4)
HGB BLD-MCNC: 13.5 G/DL (ref 11.5–15.4)
HGB BLD-MCNC: 14.9 G/DL (ref 11.5–15.4)
HGB UR QL STRIP.AUTO: ABNORMAL
IMM GRANULOCYTES # BLD AUTO: 0.03 THOUSAND/UL (ref 0–0.2)
IMM GRANULOCYTES # BLD AUTO: 0.03 THOUSAND/UL (ref 0–0.2)
IMM GRANULOCYTES # BLD AUTO: 0.06 THOUSAND/UL (ref 0–0.2)
IMM GRANULOCYTES NFR BLD AUTO: 0 % (ref 0–2)
IMM GRANULOCYTES NFR BLD AUTO: 0 % (ref 0–2)
IMM GRANULOCYTES NFR BLD AUTO: 1 % (ref 0–2)
INR PPP: 1.02 (ref 0.86–1.16)
IPAP: 14
KETONES UR STRIP-MCNC: NEGATIVE MG/DL
LACTATE SERPL-SCNC: 1.3 MMOL/L (ref 0.5–2)
LDH SERPL-CCNC: 169 U/L (ref 81–234)
LEUKOCYTE ESTERASE UR QL STRIP: ABNORMAL
LYMPHOCYTES # BLD AUTO: 0.58 THOUSANDS/ΜL (ref 0.6–4.47)
LYMPHOCYTES # BLD AUTO: 0.96 THOUSANDS/ΜL (ref 0.6–4.47)
LYMPHOCYTES # BLD AUTO: 1.41 THOUSANDS/ΜL (ref 0.6–4.47)
LYMPHOCYTES NFR BLD AUTO: 10 % (ref 14–44)
LYMPHOCYTES NFR BLD AUTO: 13 % (ref 14–44)
LYMPHOCYTES NFR BLD AUTO: 7 % (ref 14–44)
MAGNESIUM SERPL-MCNC: 2.1 MG/DL (ref 1.6–2.6)
MAGNESIUM SERPL-MCNC: 2.2 MG/DL (ref 1.6–2.6)
MAGNESIUM SERPL-MCNC: 2.4 MG/DL (ref 1.6–2.6)
MAGNESIUM SERPL-MCNC: 2.5 MG/DL (ref 1.6–2.6)
MCH RBC QN AUTO: 30.7 PG (ref 26.8–34.3)
MCH RBC QN AUTO: 31 PG (ref 26.8–34.3)
MCH RBC QN AUTO: 31.4 PG (ref 26.8–34.3)
MCHC RBC AUTO-ENTMCNC: 29.7 G/DL (ref 31.4–37.4)
MCHC RBC AUTO-ENTMCNC: 31.4 G/DL (ref 31.4–37.4)
MCHC RBC AUTO-ENTMCNC: 32.1 G/DL (ref 31.4–37.4)
MCV RBC AUTO: 103 FL (ref 82–98)
MCV RBC AUTO: 98 FL (ref 82–98)
MCV RBC AUTO: 99 FL (ref 82–98)
MONOCYTES # BLD AUTO: 0.92 THOUSAND/ΜL (ref 0.17–1.22)
MONOCYTES # BLD AUTO: 0.94 THOUSAND/ΜL (ref 0.17–1.22)
MONOCYTES # BLD AUTO: 1.42 THOUSAND/ΜL (ref 0.17–1.22)
MONOCYTES NFR BLD AUTO: 11 % (ref 4–12)
MONOCYTES NFR BLD AUTO: 15 % (ref 4–12)
MONOCYTES NFR BLD AUTO: 9 % (ref 4–12)
MRSA NOSE QL CULT: NORMAL
NASAL CANNULA: 6
NEUTROPHILS # BLD AUTO: 6.83 THOUSANDS/ΜL (ref 1.85–7.62)
NEUTROPHILS # BLD AUTO: 7.04 THOUSANDS/ΜL (ref 1.85–7.62)
NEUTROPHILS # BLD AUTO: 8.29 THOUSANDS/ΜL (ref 1.85–7.62)
NEUTS SEG NFR BLD AUTO: 75 % (ref 43–75)
NEUTS SEG NFR BLD AUTO: 77 % (ref 43–75)
NEUTS SEG NFR BLD AUTO: 80 % (ref 43–75)
NITRITE UR QL STRIP: NEGATIVE
NON VENT- BIPAP: ABNORMAL
NON-SQ EPI CELLS URNS QL MICRO: ABNORMAL /HPF
NRBC BLD AUTO-RTO: 0 /100 WBCS
NT-PROBNP SERPL-MCNC: 2464 PG/ML
O2 CT BLDA-SCNC: 20.5 ML/DL (ref 16–23)
O2 CT BLDA-SCNC: 21.1 ML/DL (ref 16–23)
OSMOLALITY UR/SERPL-RTO: 280 MMOL/KG (ref 282–298)
OSMOLALITY UR: 351 MMOL/KG
OSMOLALITY UR: 394 MMOL/KG
OXYHGB MFR BLDA: 96.4 % (ref 94–97)
OXYHGB MFR BLDA: 97 % (ref 94–97)
P AXIS: 66 DEGREES
PCO2 BLDA: 65.1 MM HG (ref 36–44)
PCO2 BLDA: 76.4 MM HG (ref 36–44)
PCO2 TEMP ADJ BLDA: 63.4 MM HG (ref 36–44)
PCO2 TEMP ADJ BLDA: 77.1 MM HG (ref 36–44)
PEEP MAX SETTING VENT: 6 CM[H2O]
PH BLD: 7.27 [PH] (ref 7.35–7.45)
PH BLD: 7.38 [PH] (ref 7.35–7.45)
PH BLDA: 7.28 [PH] (ref 7.35–7.45)
PH BLDA: 7.37 [PH] (ref 7.35–7.45)
PH UR STRIP.AUTO: 6 [PH]
PHOSPHATE SERPL-MCNC: 2.6 MG/DL (ref 2.3–4.1)
PHOSPHATE SERPL-MCNC: 2.7 MG/DL (ref 2.3–4.1)
PHOSPHATE SERPL-MCNC: 5.5 MG/DL (ref 2.3–4.1)
PLATELET # BLD AUTO: 280 THOUSANDS/UL (ref 149–390)
PLATELET # BLD AUTO: 291 THOUSANDS/UL (ref 149–390)
PLATELET # BLD AUTO: 340 THOUSANDS/UL (ref 149–390)
PMV BLD AUTO: 9 FL (ref 8.9–12.7)
PMV BLD AUTO: 9 FL (ref 8.9–12.7)
PMV BLD AUTO: 9.1 FL (ref 8.9–12.7)
PO2 BLD: 131.6 MM HG (ref 75–129)
PO2 BLD: 139.8 MM HG (ref 75–129)
PO2 BLDA: 135.2 MM HG (ref 75–129)
PO2 BLDA: 138.6 MM HG (ref 75–129)
POTASSIUM SERPL-SCNC: 4.1 MMOL/L (ref 3.5–5.3)
POTASSIUM SERPL-SCNC: 4.3 MMOL/L (ref 3.5–5.3)
POTASSIUM SERPL-SCNC: 4.4 MMOL/L (ref 3.5–5.3)
POTASSIUM SERPL-SCNC: 4.4 MMOL/L (ref 3.5–5.3)
POTASSIUM SERPL-SCNC: 4.5 MMOL/L (ref 3.5–5.3)
POTASSIUM SERPL-SCNC: 4.6 MMOL/L (ref 3.5–5.3)
POTASSIUM SERPL-SCNC: 4.7 MMOL/L (ref 3.5–5.3)
POTASSIUM SERPL-SCNC: 4.8 MMOL/L (ref 3.5–5.3)
POTASSIUM SERPL-SCNC: 4.9 MMOL/L (ref 3.5–5.3)
POTASSIUM SERPL-SCNC: 5.3 MMOL/L (ref 3.5–5.3)
POTASSIUM SERPL-SCNC: 5.3 MMOL/L (ref 3.5–5.3)
PR INTERVAL: 160 MS
PROCALCITONIN SERPL-MCNC: <0.05 NG/ML
PROCALCITONIN SERPL-MCNC: <0.05 NG/ML
PROT SERPL-MCNC: 6.6 G/DL (ref 6.4–8.2)
PROT SERPL-MCNC: 6.6 G/DL (ref 6.4–8.2)
PROT SERPL-MCNC: 7.3 G/DL (ref 6.4–8.2)
PROT UR STRIP-MCNC: NEGATIVE MG/DL
PROTHROMBIN TIME: 10.7 SECONDS (ref 9.4–11.7)
QRS AXIS: -10 DEGREES
QRSD INTERVAL: 86 MS
QT INTERVAL: 366 MS
QTC INTERVAL: 457 MS
RBC # BLD AUTO: 4.29 MILLION/UL (ref 3.81–5.12)
RBC # BLD AUTO: 4.4 MILLION/UL (ref 3.81–5.12)
RBC # BLD AUTO: 4.75 MILLION/UL (ref 3.81–5.12)
RBC #/AREA URNS AUTO: ABNORMAL /HPF
SODIUM 24H UR-SCNC: 39 MOL/L
SODIUM 24H UR-SCNC: 48 MOL/L
SODIUM SERPL-SCNC: 120 MMOL/L (ref 136–145)
SODIUM SERPL-SCNC: 122 MMOL/L (ref 136–145)
SODIUM SERPL-SCNC: 123 MMOL/L (ref 136–145)
SODIUM SERPL-SCNC: 124 MMOL/L (ref 136–145)
SODIUM SERPL-SCNC: 125 MMOL/L (ref 136–145)
SODIUM SERPL-SCNC: 127 MMOL/L (ref 136–145)
SODIUM SERPL-SCNC: 128 MMOL/L (ref 136–145)
SODIUM SERPL-SCNC: 129 MMOL/L (ref 136–145)
SODIUM SERPL-SCNC: 130 MMOL/L (ref 136–145)
SODIUM SERPL-SCNC: 130 MMOL/L (ref 136–145)
SP GR UR STRIP.AUTO: 1.01 (ref 1–1.03)
SPECIMEN SOURCE: ABNORMAL
SPECIMEN SOURCE: ABNORMAL
T WAVE AXIS: 6 DEGREES
TROPONIN I SERPL-MCNC: 0.07 NG/ML
UROBILINOGEN UR QL STRIP.AUTO: 0.2 E.U./DL
VENT BIPAP FIO2: 45 %
VENTRICULAR RATE: 94 BPM
WBC # BLD AUTO: 10.72 THOUSAND/UL (ref 4.31–10.16)
WBC # BLD AUTO: 8.61 THOUSAND/UL (ref 4.31–10.16)
WBC # BLD AUTO: 9.5 THOUSAND/UL (ref 4.31–10.16)
WBC #/AREA URNS AUTO: ABNORMAL /HPF

## 2019-01-01 PROCEDURE — 87081 CULTURE SCREEN ONLY: CPT | Performed by: ANESTHESIOLOGY

## 2019-01-01 PROCEDURE — 99233 SBSQ HOSP IP/OBS HIGH 50: CPT | Performed by: ANESTHESIOLOGY

## 2019-01-01 PROCEDURE — 36600 WITHDRAWAL OF ARTERIAL BLOOD: CPT

## 2019-01-01 PROCEDURE — 84145 PROCALCITONIN (PCT): CPT | Performed by: NURSE PRACTITIONER

## 2019-01-01 PROCEDURE — 93010 ELECTROCARDIOGRAM REPORT: CPT | Performed by: INTERNAL MEDICINE

## 2019-01-01 PROCEDURE — 84300 ASSAY OF URINE SODIUM: CPT | Performed by: PHYSICIAN ASSISTANT

## 2019-01-01 PROCEDURE — 92610 EVALUATE SWALLOWING FUNCTION: CPT

## 2019-01-01 PROCEDURE — 94664 DEMO&/EVAL PT USE INHALER: CPT

## 2019-01-01 PROCEDURE — 99232 SBSQ HOSP IP/OBS MODERATE 35: CPT | Performed by: INTERNAL MEDICINE

## 2019-01-01 PROCEDURE — 83605 ASSAY OF LACTIC ACID: CPT | Performed by: EMERGENCY MEDICINE

## 2019-01-01 PROCEDURE — 80048 BASIC METABOLIC PNL TOTAL CA: CPT | Performed by: PHYSICIAN ASSISTANT

## 2019-01-01 PROCEDURE — 80048 BASIC METABOLIC PNL TOTAL CA: CPT | Performed by: NURSE PRACTITIONER

## 2019-01-01 PROCEDURE — 84300 ASSAY OF URINE SODIUM: CPT | Performed by: NURSE PRACTITIONER

## 2019-01-01 PROCEDURE — 83615 LACTATE (LD) (LDH) ENZYME: CPT | Performed by: INTERNAL MEDICINE

## 2019-01-01 PROCEDURE — 85610 PROTHROMBIN TIME: CPT | Performed by: EMERGENCY MEDICINE

## 2019-01-01 PROCEDURE — 93306 TTE W/DOPPLER COMPLETE: CPT | Performed by: INTERNAL MEDICINE

## 2019-01-01 PROCEDURE — G8987 SELF CARE CURRENT STATUS: HCPCS

## 2019-01-01 PROCEDURE — 94660 CPAP INITIATION&MGMT: CPT

## 2019-01-01 PROCEDURE — 99233 SBSQ HOSP IP/OBS HIGH 50: CPT | Performed by: INTERNAL MEDICINE

## 2019-01-01 PROCEDURE — 83735 ASSAY OF MAGNESIUM: CPT | Performed by: STUDENT IN AN ORGANIZED HEALTH CARE EDUCATION/TRAINING PROGRAM

## 2019-01-01 PROCEDURE — 97110 THERAPEUTIC EXERCISES: CPT | Performed by: PHYSICAL THERAPIST

## 2019-01-01 PROCEDURE — G8978 MOBILITY CURRENT STATUS: HCPCS | Performed by: PHYSICAL THERAPIST

## 2019-01-01 PROCEDURE — 71045 X-RAY EXAM CHEST 1 VIEW: CPT

## 2019-01-01 PROCEDURE — 80053 COMPREHEN METABOLIC PANEL: CPT | Performed by: STUDENT IN AN ORGANIZED HEALTH CARE EDUCATION/TRAINING PROGRAM

## 2019-01-01 PROCEDURE — 99223 1ST HOSP IP/OBS HIGH 75: CPT | Performed by: INTERNAL MEDICINE

## 2019-01-01 PROCEDURE — 94760 N-INVAS EAR/PLS OXIMETRY 1: CPT

## 2019-01-01 PROCEDURE — 94640 AIRWAY INHALATION TREATMENT: CPT

## 2019-01-01 PROCEDURE — 85730 THROMBOPLASTIN TIME PARTIAL: CPT | Performed by: EMERGENCY MEDICINE

## 2019-01-01 PROCEDURE — 80053 COMPREHEN METABOLIC PANEL: CPT | Performed by: EMERGENCY MEDICINE

## 2019-01-01 PROCEDURE — 85025 COMPLETE CBC W/AUTO DIFF WBC: CPT | Performed by: STUDENT IN AN ORGANIZED HEALTH CARE EDUCATION/TRAINING PROGRAM

## 2019-01-01 PROCEDURE — 94668 MNPJ CHEST WALL SBSQ: CPT

## 2019-01-01 PROCEDURE — 97163 PT EVAL HIGH COMPLEX 45 MIN: CPT | Performed by: PHYSICAL THERAPIST

## 2019-01-01 PROCEDURE — 99236 HOSP IP/OBS SAME DATE HI 85: CPT | Performed by: ANESTHESIOLOGY

## 2019-01-01 PROCEDURE — 99233 SBSQ HOSP IP/OBS HIGH 50: CPT | Performed by: NURSE PRACTITIONER

## 2019-01-01 PROCEDURE — 96365 THER/PROPH/DIAG IV INF INIT: CPT

## 2019-01-01 PROCEDURE — 87040 BLOOD CULTURE FOR BACTERIA: CPT | Performed by: EMERGENCY MEDICINE

## 2019-01-01 PROCEDURE — 82805 BLOOD GASES W/O2 SATURATION: CPT | Performed by: EMERGENCY MEDICINE

## 2019-01-01 PROCEDURE — 82805 BLOOD GASES W/O2 SATURATION: CPT | Performed by: NURSE PRACTITIONER

## 2019-01-01 PROCEDURE — 83735 ASSAY OF MAGNESIUM: CPT | Performed by: EMERGENCY MEDICINE

## 2019-01-01 PROCEDURE — 83735 ASSAY OF MAGNESIUM: CPT | Performed by: NURSE PRACTITIONER

## 2019-01-01 PROCEDURE — 83880 ASSAY OF NATRIURETIC PEPTIDE: CPT | Performed by: EMERGENCY MEDICINE

## 2019-01-01 PROCEDURE — 80053 COMPREHEN METABOLIC PANEL: CPT | Performed by: PHYSICIAN ASSISTANT

## 2019-01-01 PROCEDURE — 87086 URINE CULTURE/COLONY COUNT: CPT | Performed by: NURSE PRACTITIONER

## 2019-01-01 PROCEDURE — 92526 ORAL FUNCTION THERAPY: CPT

## 2019-01-01 PROCEDURE — 81001 URINALYSIS AUTO W/SCOPE: CPT | Performed by: EMERGENCY MEDICINE

## 2019-01-01 PROCEDURE — 99285 EMERGENCY DEPT VISIT HI MDM: CPT

## 2019-01-01 PROCEDURE — 83935 ASSAY OF URINE OSMOLALITY: CPT | Performed by: NURSE PRACTITIONER

## 2019-01-01 PROCEDURE — G8988 SELF CARE GOAL STATUS: HCPCS

## 2019-01-01 PROCEDURE — G8979 MOBILITY GOAL STATUS: HCPCS | Performed by: PHYSICAL THERAPIST

## 2019-01-01 PROCEDURE — 97167 OT EVAL HIGH COMPLEX 60 MIN: CPT

## 2019-01-01 PROCEDURE — 97110 THERAPEUTIC EXERCISES: CPT

## 2019-01-01 PROCEDURE — 84100 ASSAY OF PHOSPHORUS: CPT | Performed by: STUDENT IN AN ORGANIZED HEALTH CARE EDUCATION/TRAINING PROGRAM

## 2019-01-01 PROCEDURE — 83735 ASSAY OF MAGNESIUM: CPT | Performed by: PHYSICIAN ASSISTANT

## 2019-01-01 PROCEDURE — 83930 ASSAY OF BLOOD OSMOLALITY: CPT | Performed by: PHYSICIAN ASSISTANT

## 2019-01-01 PROCEDURE — 99238 HOSP IP/OBS DSCHRG MGMT 30/<: CPT | Performed by: PHYSICIAN ASSISTANT

## 2019-01-01 PROCEDURE — 97530 THERAPEUTIC ACTIVITIES: CPT

## 2019-01-01 PROCEDURE — 84484 ASSAY OF TROPONIN QUANT: CPT | Performed by: EMERGENCY MEDICINE

## 2019-01-01 PROCEDURE — 84100 ASSAY OF PHOSPHORUS: CPT | Performed by: NURSE PRACTITIONER

## 2019-01-01 PROCEDURE — NC001 PR NO CHARGE: Performed by: NURSE PRACTITIONER

## 2019-01-01 PROCEDURE — NC001 PR NO CHARGE: Performed by: PHYSICIAN ASSISTANT

## 2019-01-01 PROCEDURE — 96374 THER/PROPH/DIAG INJ IV PUSH: CPT

## 2019-01-01 PROCEDURE — 83935 ASSAY OF URINE OSMOLALITY: CPT | Performed by: PHYSICIAN ASSISTANT

## 2019-01-01 PROCEDURE — 36415 COLL VENOUS BLD VENIPUNCTURE: CPT | Performed by: EMERGENCY MEDICINE

## 2019-01-01 PROCEDURE — 85025 COMPLETE CBC W/AUTO DIFF WBC: CPT | Performed by: PHYSICIAN ASSISTANT

## 2019-01-01 PROCEDURE — 93306 TTE W/DOPPLER COMPLETE: CPT

## 2019-01-01 PROCEDURE — 93005 ELECTROCARDIOGRAM TRACING: CPT

## 2019-01-01 PROCEDURE — 82948 REAGENT STRIP/BLOOD GLUCOSE: CPT

## 2019-01-01 PROCEDURE — 84100 ASSAY OF PHOSPHORUS: CPT | Performed by: PHYSICIAN ASSISTANT

## 2019-01-01 PROCEDURE — 99223 1ST HOSP IP/OBS HIGH 75: CPT | Performed by: NURSE PRACTITIONER

## 2019-01-01 PROCEDURE — 85025 COMPLETE CBC W/AUTO DIFF WBC: CPT | Performed by: EMERGENCY MEDICINE

## 2019-01-01 RX ORDER — GLYCOPYRROLATE 0.2 MG/ML
0.2 INJECTION INTRAMUSCULAR; INTRAVENOUS EVERY 4 HOURS PRN
Status: DISCONTINUED | OUTPATIENT
Start: 2019-01-01 | End: 2019-01-01 | Stop reason: HOSPADM

## 2019-01-01 RX ORDER — IPRATROPIUM BROMIDE AND ALBUTEROL SULFATE 2.5; .5 MG/3ML; MG/3ML
3 SOLUTION RESPIRATORY (INHALATION)
Status: DISCONTINUED | OUTPATIENT
Start: 2019-01-01 | End: 2019-01-01

## 2019-01-01 RX ORDER — DOCUSATE SODIUM 100 MG/1
100 CAPSULE, LIQUID FILLED ORAL DAILY
COMMUNITY

## 2019-01-01 RX ORDER — SODIUM CHLORIDE, SODIUM GLUCONATE, SODIUM ACETATE, POTASSIUM CHLORIDE, MAGNESIUM CHLORIDE, SODIUM PHOSPHATE, DIBASIC, AND POTASSIUM PHOSPHATE .53; .5; .37; .037; .03; .012; .00082 G/100ML; G/100ML; G/100ML; G/100ML; G/100ML; G/100ML; G/100ML
250 INJECTION, SOLUTION INTRAVENOUS ONCE
Status: COMPLETED | OUTPATIENT
Start: 2019-01-01 | End: 2019-01-01

## 2019-01-01 RX ORDER — ACETAMINOPHEN 325 MG/1
650 TABLET ORAL EVERY 6 HOURS PRN
Status: DISCONTINUED | OUTPATIENT
Start: 2019-01-01 | End: 2019-01-01 | Stop reason: HOSPADM

## 2019-01-01 RX ORDER — BISACODYL 10 MG
10 SUPPOSITORY, RECTAL RECTAL DAILY PRN
Status: DISCONTINUED | OUTPATIENT
Start: 2019-01-01 | End: 2019-01-01 | Stop reason: HOSPADM

## 2019-01-01 RX ORDER — CITALOPRAM 10 MG/1
10 TABLET ORAL DAILY
COMMUNITY

## 2019-01-01 RX ORDER — IPRATROPIUM BROMIDE AND ALBUTEROL SULFATE 2.5; .5 MG/3ML; MG/3ML
3 SOLUTION RESPIRATORY (INHALATION) EVERY 6 HOURS PRN
Status: CANCELLED | OUTPATIENT
Start: 2019-01-01

## 2019-01-01 RX ORDER — ALBUTEROL SULFATE 1.25 MG/3ML
1 SOLUTION RESPIRATORY (INHALATION) EVERY 6 HOURS PRN
COMMUNITY

## 2019-01-01 RX ORDER — IPRATROPIUM BROMIDE AND ALBUTEROL SULFATE 2.5; .5 MG/3ML; MG/3ML
3 SOLUTION RESPIRATORY (INHALATION) ONCE
Status: COMPLETED | OUTPATIENT
Start: 2019-01-01 | End: 2019-01-01

## 2019-01-01 RX ORDER — LORAZEPAM 2 MG/ML
0.5 INJECTION INTRAMUSCULAR
Status: DISCONTINUED | OUTPATIENT
Start: 2019-01-01 | End: 2019-01-01 | Stop reason: HOSPADM

## 2019-01-01 RX ORDER — FUROSEMIDE 10 MG/ML
40 INJECTION INTRAMUSCULAR; INTRAVENOUS ONCE
Status: COMPLETED | OUTPATIENT
Start: 2019-01-01 | End: 2019-01-01

## 2019-01-01 RX ORDER — ACETAZOLAMIDE 500 MG/5ML
250 INJECTION, POWDER, LYOPHILIZED, FOR SOLUTION INTRAVENOUS ONCE
Status: COMPLETED | OUTPATIENT
Start: 2019-01-01 | End: 2019-01-01

## 2019-01-01 RX ORDER — POLYETHYLENE GLYCOL 3350 17 G/17G
17 POWDER, FOR SOLUTION ORAL DAILY PRN
Status: DISCONTINUED | OUTPATIENT
Start: 2019-01-01 | End: 2019-01-01

## 2019-01-01 RX ORDER — DOCUSATE SODIUM 100 MG/1
100 CAPSULE, LIQUID FILLED ORAL DAILY
Status: DISCONTINUED | OUTPATIENT
Start: 2019-08-02 | End: 2019-01-01 | Stop reason: HOSPADM

## 2019-01-01 RX ORDER — DOCUSATE SODIUM 100 MG/1
100 CAPSULE, LIQUID FILLED ORAL DAILY
Status: DISCONTINUED | OUTPATIENT
Start: 2019-01-01 | End: 2019-01-01 | Stop reason: HOSPADM

## 2019-01-01 RX ORDER — FUROSEMIDE 10 MG/ML
60 INJECTION INTRAMUSCULAR; INTRAVENOUS ONCE
Status: COMPLETED | OUTPATIENT
Start: 2019-01-01 | End: 2019-01-01

## 2019-01-01 RX ORDER — PRIMIDONE 50 MG/1
50 TABLET ORAL EVERY 12 HOURS SCHEDULED
Status: DISCONTINUED | OUTPATIENT
Start: 2019-01-01 | End: 2019-01-01

## 2019-01-01 RX ORDER — ACETYLCYSTEINE 200 MG/ML
3 SOLUTION ORAL; RESPIRATORY (INHALATION)
Status: DISCONTINUED | OUTPATIENT
Start: 2019-01-01 | End: 2019-01-01

## 2019-01-01 RX ORDER — BISACODYL 10 MG
10 SUPPOSITORY, RECTAL RECTAL DAILY PRN
Status: CANCELLED | OUTPATIENT
Start: 2019-01-01

## 2019-01-01 RX ORDER — ACETAMINOPHEN 325 MG/1
650 TABLET ORAL EVERY 6 HOURS SCHEDULED
Status: DISCONTINUED | OUTPATIENT
Start: 2019-01-01 | End: 2019-01-01

## 2019-01-01 RX ORDER — ALBUTEROL SULFATE 2.5 MG/3ML
2 SOLUTION RESPIRATORY (INHALATION) ONCE
Status: COMPLETED | OUTPATIENT
Start: 2019-01-01 | End: 2019-01-01

## 2019-01-01 RX ORDER — CITALOPRAM 20 MG/1
10 TABLET ORAL DAILY
Status: DISCONTINUED | OUTPATIENT
Start: 2019-01-01 | End: 2019-01-01

## 2019-01-01 RX ORDER — FUROSEMIDE 10 MG/ML
20 INJECTION INTRAMUSCULAR; INTRAVENOUS ONCE
Status: COMPLETED | OUTPATIENT
Start: 2019-01-01 | End: 2019-01-01

## 2019-01-01 RX ORDER — BUDESONIDE 0.5 MG/2ML
0.5 INHALANT ORAL
Status: DISCONTINUED | OUTPATIENT
Start: 2019-01-01 | End: 2019-01-01

## 2019-01-01 RX ORDER — ONDANSETRON 2 MG/ML
4 INJECTION INTRAMUSCULAR; INTRAVENOUS EVERY 6 HOURS PRN
Status: DISCONTINUED | OUTPATIENT
Start: 2019-01-01 | End: 2019-01-01 | Stop reason: HOSPADM

## 2019-01-01 RX ORDER — METHYLPREDNISOLONE SOD SUCC 125 MG
1 VIAL (EA) INJECTION ONCE
Status: COMPLETED | OUTPATIENT
Start: 2019-01-01 | End: 2019-01-01

## 2019-01-01 RX ORDER — PANTOPRAZOLE SODIUM 40 MG/1
40 TABLET, DELAYED RELEASE ORAL DAILY
Status: DISCONTINUED | OUTPATIENT
Start: 2019-01-01 | End: 2019-01-01

## 2019-01-01 RX ORDER — IPRATROPIUM BROMIDE AND ALBUTEROL SULFATE 2.5; .5 MG/3ML; MG/3ML
3 SOLUTION RESPIRATORY (INHALATION) EVERY 6 HOURS PRN
Status: DISCONTINUED | OUTPATIENT
Start: 2019-01-01 | End: 2019-01-01 | Stop reason: SDUPTHER

## 2019-01-01 RX ORDER — ACETAMINOPHEN 325 MG/1
650 TABLET ORAL EVERY 6 HOURS PRN
Status: DISCONTINUED | OUTPATIENT
Start: 2019-01-01 | End: 2019-01-01

## 2019-01-01 RX ORDER — IPRATROPIUM BROMIDE AND ALBUTEROL SULFATE 2.5; .5 MG/3ML; MG/3ML
3 SOLUTION RESPIRATORY (INHALATION) EVERY 6 HOURS PRN
Status: DISCONTINUED | OUTPATIENT
Start: 2019-01-01 | End: 2019-01-01 | Stop reason: HOSPADM

## 2019-01-01 RX ORDER — ALBUMIN (HUMAN) 12.5 G/50ML
50 SOLUTION INTRAVENOUS ONCE
Status: COMPLETED | OUTPATIENT
Start: 2019-01-01 | End: 2019-01-01

## 2019-01-01 RX ORDER — LEVALBUTEROL INHALATION SOLUTION 0.63 MG/3ML
0.63 SOLUTION RESPIRATORY (INHALATION) EVERY 8 HOURS
Status: DISCONTINUED | OUTPATIENT
Start: 2019-01-01 | End: 2019-01-01

## 2019-01-01 RX ORDER — ACETAMINOPHEN 325 MG/1
650 TABLET ORAL EVERY 6 HOURS PRN
Status: CANCELLED | OUTPATIENT
Start: 2019-01-01

## 2019-01-01 RX ORDER — FLUTICASONE PROPIONATE 50 MCG
1 SPRAY, SUSPENSION (ML) NASAL DAILY
COMMUNITY

## 2019-01-01 RX ORDER — LORAZEPAM 2 MG/ML
0.5 INJECTION INTRAMUSCULAR
Status: CANCELLED | OUTPATIENT
Start: 2019-01-01

## 2019-01-01 RX ORDER — OXYCODONE HYDROCHLORIDE 5 MG/1
2.5 TABLET ORAL EVERY 4 HOURS PRN
Status: DISCONTINUED | OUTPATIENT
Start: 2019-01-01 | End: 2019-01-01

## 2019-01-01 RX ORDER — ONDANSETRON 2 MG/ML
4 INJECTION INTRAMUSCULAR; INTRAVENOUS EVERY 6 HOURS PRN
Status: CANCELLED | OUTPATIENT
Start: 2019-01-01

## 2019-01-01 RX ORDER — NITROGLYCERIN 0.4 MG/1
0.4 TABLET SUBLINGUAL
COMMUNITY

## 2019-01-01 RX ORDER — DOCUSATE SODIUM 100 MG/1
100 CAPSULE, LIQUID FILLED ORAL DAILY
Status: CANCELLED | OUTPATIENT
Start: 2019-08-02

## 2019-01-01 RX ORDER — PRAMIPEXOLE DIHYDROCHLORIDE 0.25 MG/1
0.12 TABLET ORAL 3 TIMES DAILY
Status: DISCONTINUED | OUTPATIENT
Start: 2019-01-01 | End: 2019-01-01

## 2019-01-01 RX ORDER — PANTOPRAZOLE SODIUM 40 MG/1
40 TABLET, DELAYED RELEASE ORAL DAILY
COMMUNITY

## 2019-01-01 RX ORDER — SODIUM CHLORIDE 9 MG/ML
100 INJECTION, SOLUTION INTRAVENOUS CONTINUOUS
Status: DISPENSED | OUTPATIENT
Start: 2019-01-01 | End: 2019-01-01

## 2019-01-01 RX ORDER — FUROSEMIDE 40 MG/1
40 TABLET ORAL 2 TIMES DAILY
COMMUNITY

## 2019-01-01 RX ORDER — LEVOTHYROXINE SODIUM 0.07 MG/1
75 TABLET ORAL DAILY
Status: DISCONTINUED | OUTPATIENT
Start: 2019-01-01 | End: 2019-01-01

## 2019-01-01 RX ORDER — ALBUMIN (HUMAN) 12.5 G/50ML
25 SOLUTION INTRAVENOUS ONCE
Status: COMPLETED | OUTPATIENT
Start: 2019-01-01 | End: 2019-01-01

## 2019-01-01 RX ORDER — LANOLIN ALCOHOL/MO/W.PET/CERES
6 CREAM (GRAM) TOPICAL
Status: DISCONTINUED | OUTPATIENT
Start: 2019-01-01 | End: 2019-01-01

## 2019-01-01 RX ORDER — LANOLIN ALCOHOL/MO/W.PET/CERES
3 CREAM (GRAM) TOPICAL
Status: DISCONTINUED | OUTPATIENT
Start: 2019-01-01 | End: 2019-01-01

## 2019-01-01 RX ORDER — GLYCOPYRROLATE 0.2 MG/ML
0.2 INJECTION INTRAMUSCULAR; INTRAVENOUS EVERY 4 HOURS PRN
Status: CANCELLED | OUTPATIENT
Start: 2019-01-01

## 2019-01-01 RX ADMIN — CEFEPIME HYDROCHLORIDE 2000 MG: 2 INJECTION, POWDER, FOR SOLUTION INTRAVENOUS at 14:22

## 2019-01-01 RX ADMIN — CEFEPIME HYDROCHLORIDE 2000 MG: 2 INJECTION, POWDER, FOR SOLUTION INTRAVENOUS at 03:05

## 2019-01-01 RX ADMIN — ACETAMINOPHEN 650 MG: 325 TABLET, FILM COATED ORAL at 22:13

## 2019-01-01 RX ADMIN — OXYCODONE HYDROCHLORIDE 2.5 MG: 5 TABLET ORAL at 20:05

## 2019-01-01 RX ADMIN — LEVALBUTEROL HYDROCHLORIDE 0.63 MG: 0.63 SOLUTION RESPIRATORY (INHALATION) at 07:23

## 2019-01-01 RX ADMIN — DOCUSATE SODIUM 100 MG: 100 CAPSULE, LIQUID FILLED ORAL at 08:03

## 2019-01-01 RX ADMIN — BUDESONIDE 0.5 MG: 0.5 INHALANT RESPIRATORY (INHALATION) at 20:22

## 2019-01-01 RX ADMIN — LEVALBUTEROL HYDROCHLORIDE 0.63 MG: 0.63 SOLUTION RESPIRATORY (INHALATION) at 07:38

## 2019-01-01 RX ADMIN — PRIMIDONE 50 MG: 50 TABLET ORAL at 20:07

## 2019-01-01 RX ADMIN — CITALOPRAM HYDROBROMIDE 10 MG: 20 TABLET ORAL at 09:33

## 2019-01-01 RX ADMIN — ACETAMINOPHEN 650 MG: 325 TABLET, FILM COATED ORAL at 12:52

## 2019-01-01 RX ADMIN — PRAMIPEXOLE DIHYDROCHLORIDE 0.12 MG: 0.25 TABLET ORAL at 08:03

## 2019-01-01 RX ADMIN — CITALOPRAM HYDROBROMIDE 10 MG: 20 TABLET ORAL at 09:02

## 2019-01-01 RX ADMIN — ACETAMINOPHEN 650 MG: 325 TABLET, FILM COATED ORAL at 18:29

## 2019-01-01 RX ADMIN — SODIUM CHLORIDE, SODIUM GLUCONATE, SODIUM ACETATE, POTASSIUM CHLORIDE, MAGNESIUM CHLORIDE, SODIUM PHOSPHATE, DIBASIC, AND POTASSIUM PHOSPHATE 250 ML: .53; .5; .37; .037; .03; .012; .00082 INJECTION, SOLUTION INTRAVENOUS at 01:39

## 2019-01-01 RX ADMIN — LEVALBUTEROL HYDROCHLORIDE 0.63 MG: 0.63 SOLUTION RESPIRATORY (INHALATION) at 15:07

## 2019-01-01 RX ADMIN — SODIUM CHLORIDE 100 ML/HR: 0.9 INJECTION, SOLUTION INTRAVENOUS at 13:09

## 2019-01-01 RX ADMIN — FUROSEMIDE 20 MG: 10 INJECTION, SOLUTION INTRAMUSCULAR; INTRAVENOUS at 18:42

## 2019-01-01 RX ADMIN — PRAMIPEXOLE DIHYDROCHLORIDE 0.12 MG: 0.25 TABLET ORAL at 09:10

## 2019-01-01 RX ADMIN — LEVALBUTEROL HYDROCHLORIDE 0.63 MG: 0.63 SOLUTION RESPIRATORY (INHALATION) at 18:30

## 2019-01-01 RX ADMIN — LEVOTHYROXINE SODIUM 75 MCG: 75 TABLET ORAL at 06:00

## 2019-01-01 RX ADMIN — MORPHINE SULFATE 2 MG: 2 INJECTION, SOLUTION INTRAMUSCULAR; INTRAVENOUS at 20:40

## 2019-01-01 RX ADMIN — ALBUMIN (HUMAN) 25 G: 12.5 SOLUTION INTRAVENOUS at 23:20

## 2019-01-01 RX ADMIN — ACETAMINOPHEN 650 MG: 325 TABLET, FILM COATED ORAL at 23:51

## 2019-01-01 RX ADMIN — MORPHINE SULFATE 2 MG: 2 INJECTION, SOLUTION INTRAMUSCULAR; INTRAVENOUS at 18:45

## 2019-01-01 RX ADMIN — PRIMIDONE 50 MG: 50 TABLET ORAL at 11:14

## 2019-01-01 RX ADMIN — ENOXAPARIN SODIUM 40 MG: 40 INJECTION SUBCUTANEOUS at 09:33

## 2019-01-01 RX ADMIN — PRIMIDONE 50 MG: 50 TABLET ORAL at 09:41

## 2019-01-01 RX ADMIN — DOCUSATE SODIUM 100 MG: 100 CAPSULE, LIQUID FILLED ORAL at 09:01

## 2019-01-01 RX ADMIN — OXYCODONE HYDROCHLORIDE 2.5 MG: 5 TABLET ORAL at 17:45

## 2019-01-01 RX ADMIN — WATER 10 ML: 1 INJECTION INTRAMUSCULAR; INTRAVENOUS; SUBCUTANEOUS at 09:31

## 2019-01-01 RX ADMIN — PANTOPRAZOLE SODIUM 40 MG: 40 TABLET, DELAYED RELEASE ORAL at 09:09

## 2019-01-01 RX ADMIN — ACETAMINOPHEN 650 MG: 325 TABLET, FILM COATED ORAL at 05:10

## 2019-01-01 RX ADMIN — LEVOTHYROXINE SODIUM 75 MCG: 75 TABLET ORAL at 05:43

## 2019-01-01 RX ADMIN — ACETAMINOPHEN 650 MG: 325 TABLET, FILM COATED ORAL at 23:18

## 2019-01-01 RX ADMIN — PANTOPRAZOLE SODIUM 40 MG: 40 TABLET, DELAYED RELEASE ORAL at 09:41

## 2019-01-01 RX ADMIN — BUDESONIDE 0.5 MG: 0.5 INHALANT RESPIRATORY (INHALATION) at 07:38

## 2019-01-01 RX ADMIN — SODIUM CHLORIDE, SODIUM GLUCONATE, SODIUM ACETATE, POTASSIUM CHLORIDE, MAGNESIUM CHLORIDE, SODIUM PHOSPHATE, DIBASIC, AND POTASSIUM PHOSPHATE 250 ML: .53; .5; .37; .037; .03; .012; .00082 INJECTION, SOLUTION INTRAVENOUS at 00:34

## 2019-01-01 RX ADMIN — BUDESONIDE 0.5 MG: 0.5 INHALANT RESPIRATORY (INHALATION) at 07:26

## 2019-01-01 RX ADMIN — MORPHINE SULFATE 2 MG: 2 INJECTION, SOLUTION INTRAMUSCULAR; INTRAVENOUS at 08:32

## 2019-01-01 RX ADMIN — ENOXAPARIN SODIUM 40 MG: 40 INJECTION SUBCUTANEOUS at 08:21

## 2019-01-01 RX ADMIN — LORAZEPAM 0.5 MG: 2 INJECTION INTRAMUSCULAR; INTRAVENOUS at 17:52

## 2019-01-01 RX ADMIN — LEVALBUTEROL HYDROCHLORIDE 0.63 MG: 0.63 SOLUTION RESPIRATORY (INHALATION) at 15:20

## 2019-01-01 RX ADMIN — DOCUSATE SODIUM 100 MG: 100 CAPSULE, LIQUID FILLED ORAL at 08:06

## 2019-01-01 RX ADMIN — PRIMIDONE 50 MG: 50 TABLET ORAL at 21:31

## 2019-01-01 RX ADMIN — LEVOTHYROXINE SODIUM 75 MCG: 75 TABLET ORAL at 06:17

## 2019-01-01 RX ADMIN — LEVOTHYROXINE SODIUM 75 MCG: 75 TABLET ORAL at 06:09

## 2019-01-01 RX ADMIN — CITALOPRAM HYDROBROMIDE 10 MG: 20 TABLET ORAL at 09:10

## 2019-01-01 RX ADMIN — LEVALBUTEROL HYDROCHLORIDE 0.63 MG: 0.63 SOLUTION RESPIRATORY (INHALATION) at 00:55

## 2019-01-01 RX ADMIN — BUDESONIDE 0.5 MG: 0.5 INHALANT RESPIRATORY (INHALATION) at 19:38

## 2019-01-01 RX ADMIN — FUROSEMIDE 60 MG: 10 INJECTION, SOLUTION INTRAMUSCULAR; INTRAVENOUS at 18:28

## 2019-01-01 RX ADMIN — ACETAZOLAMIDE 250 MG: 500 INJECTION, POWDER, LYOPHILIZED, FOR SOLUTION INTRAVENOUS at 09:31

## 2019-01-01 RX ADMIN — BUDESONIDE 0.5 MG: 0.5 INHALANT RESPIRATORY (INHALATION) at 19:32

## 2019-01-01 RX ADMIN — MORPHINE SULFATE 0.5 MG: 2 INJECTION, SOLUTION INTRAMUSCULAR; INTRAVENOUS at 14:45

## 2019-01-01 RX ADMIN — IPRATROPIUM BROMIDE AND ALBUTEROL SULFATE 3 ML: 2.5; .5 SOLUTION RESPIRATORY (INHALATION) at 13:59

## 2019-01-01 RX ADMIN — IPRATROPIUM BROMIDE AND ALBUTEROL SULFATE 3 ML: 2.5; .5 SOLUTION RESPIRATORY (INHALATION) at 13:03

## 2019-01-01 RX ADMIN — FUROSEMIDE 40 MG: 10 INJECTION, SOLUTION INTRAMUSCULAR; INTRAVENOUS at 10:34

## 2019-01-01 RX ADMIN — MELATONIN 6 MG: 3 TAB ORAL at 22:22

## 2019-01-01 RX ADMIN — IPRATROPIUM BROMIDE AND ALBUTEROL SULFATE 3 ML: 2.5; .5 SOLUTION RESPIRATORY (INHALATION) at 07:11

## 2019-01-01 RX ADMIN — CITALOPRAM HYDROBROMIDE 10 MG: 20 TABLET ORAL at 08:05

## 2019-01-01 RX ADMIN — CITALOPRAM HYDROBROMIDE 10 MG: 20 TABLET ORAL at 08:02

## 2019-01-01 RX ADMIN — MELATONIN 6 MG: 3 TAB ORAL at 21:31

## 2019-01-01 RX ADMIN — LEVALBUTEROL HYDROCHLORIDE 0.63 MG: 0.63 SOLUTION RESPIRATORY (INHALATION) at 07:27

## 2019-01-01 RX ADMIN — PRAMIPEXOLE DIHYDROCHLORIDE 0.12 MG: 0.25 TABLET ORAL at 09:02

## 2019-01-01 RX ADMIN — PRAMIPEXOLE DIHYDROCHLORIDE 0.12 MG: 0.25 TABLET ORAL at 16:27

## 2019-01-01 RX ADMIN — PANTOPRAZOLE SODIUM 40 MG: 40 TABLET, DELAYED RELEASE ORAL at 08:02

## 2019-01-01 RX ADMIN — MORPHINE SULFATE 0.5 MG: 2 INJECTION, SOLUTION INTRAMUSCULAR; INTRAVENOUS at 12:30

## 2019-01-01 RX ADMIN — LEVOTHYROXINE SODIUM 75 MCG: 75 TABLET ORAL at 05:10

## 2019-01-01 RX ADMIN — PRAMIPEXOLE DIHYDROCHLORIDE 0.12 MG: 0.25 TABLET ORAL at 21:05

## 2019-01-01 RX ADMIN — PRAMIPEXOLE DIHYDROCHLORIDE 0.12 MG: 0.25 TABLET ORAL at 21:32

## 2019-01-01 RX ADMIN — ACETAMINOPHEN 650 MG: 325 TABLET, FILM COATED ORAL at 12:26

## 2019-01-01 RX ADMIN — PRIMIDONE 50 MG: 50 TABLET ORAL at 09:10

## 2019-01-01 RX ADMIN — SODIUM CHLORIDE, SODIUM GLUCONATE, SODIUM ACETATE, POTASSIUM CHLORIDE, MAGNESIUM CHLORIDE, SODIUM PHOSPHATE, DIBASIC, AND POTASSIUM PHOSPHATE 250 ML: .53; .5; .37; .037; .03; .012; .00082 INJECTION, SOLUTION INTRAVENOUS at 01:07

## 2019-01-01 RX ADMIN — ENOXAPARIN SODIUM 40 MG: 40 INJECTION SUBCUTANEOUS at 09:11

## 2019-01-01 RX ADMIN — LEVOTHYROXINE SODIUM 75 MCG: 75 TABLET ORAL at 05:09

## 2019-01-01 RX ADMIN — CITALOPRAM HYDROBROMIDE 10 MG: 20 TABLET ORAL at 09:40

## 2019-01-01 RX ADMIN — MELATONIN 3 MG: at 23:28

## 2019-01-01 RX ADMIN — PRAMIPEXOLE DIHYDROCHLORIDE 0.12 MG: 0.25 TABLET ORAL at 16:46

## 2019-01-01 RX ADMIN — PANTOPRAZOLE SODIUM 40 MG: 40 TABLET, DELAYED RELEASE ORAL at 09:33

## 2019-01-01 RX ADMIN — LEVALBUTEROL HYDROCHLORIDE 0.63 MG: 0.63 SOLUTION RESPIRATORY (INHALATION) at 15:50

## 2019-01-01 RX ADMIN — BUDESONIDE 0.5 MG: 0.5 INHALANT RESPIRATORY (INHALATION) at 07:11

## 2019-01-01 RX ADMIN — ACETAMINOPHEN 650 MG: 325 TABLET, FILM COATED ORAL at 06:09

## 2019-01-01 RX ADMIN — OXYCODONE HYDROCHLORIDE 2.5 MG: 5 TABLET ORAL at 21:51

## 2019-01-01 RX ADMIN — PANTOPRAZOLE SODIUM 40 MG: 40 TABLET, DELAYED RELEASE ORAL at 09:02

## 2019-01-01 RX ADMIN — PRAMIPEXOLE DIHYDROCHLORIDE 0.12 MG: 0.25 TABLET ORAL at 16:44

## 2019-01-01 RX ADMIN — CEFEPIME HYDROCHLORIDE 2000 MG: 2 INJECTION, POWDER, FOR SOLUTION INTRAVENOUS at 14:12

## 2019-01-01 RX ADMIN — CEFEPIME HYDROCHLORIDE 2000 MG: 2 INJECTION, POWDER, FOR SOLUTION INTRAVENOUS at 03:14

## 2019-01-01 RX ADMIN — FUROSEMIDE 20 MG: 10 INJECTION, SOLUTION INTRAVENOUS at 14:00

## 2019-01-01 RX ADMIN — DOCUSATE SODIUM 100 MG: 100 CAPSULE, LIQUID FILLED ORAL at 09:10

## 2019-01-01 RX ADMIN — LEVALBUTEROL HYDROCHLORIDE 0.63 MG: 0.63 SOLUTION RESPIRATORY (INHALATION) at 00:08

## 2019-01-01 RX ADMIN — ACETAMINOPHEN 650 MG: 325 TABLET, FILM COATED ORAL at 17:19

## 2019-01-01 RX ADMIN — DOCUSATE SODIUM 100 MG: 100 CAPSULE, LIQUID FILLED ORAL at 09:33

## 2019-01-01 RX ADMIN — BUDESONIDE 0.5 MG: 0.5 INHALANT RESPIRATORY (INHALATION) at 19:29

## 2019-01-01 RX ADMIN — FUROSEMIDE 40 MG: 10 INJECTION, SOLUTION INTRAMUSCULAR; INTRAVENOUS at 09:02

## 2019-01-01 RX ADMIN — ACETAMINOPHEN 650 MG: 325 TABLET, FILM COATED ORAL at 12:18

## 2019-01-01 RX ADMIN — IPRATROPIUM BROMIDE AND ALBUTEROL SULFATE 3 ML: 2.5; .5 SOLUTION RESPIRATORY (INHALATION) at 07:10

## 2019-01-01 RX ADMIN — POLYETHYLENE GLYCOL 3350 17 G: 17 POWDER, FOR SOLUTION ORAL at 20:08

## 2019-01-01 RX ADMIN — LEVALBUTEROL HYDROCHLORIDE 0.63 MG: 0.63 SOLUTION RESPIRATORY (INHALATION) at 23:48

## 2019-01-01 RX ADMIN — PRIMIDONE 50 MG: 50 TABLET ORAL at 08:03

## 2019-01-01 RX ADMIN — OXYCODONE HYDROCHLORIDE 2.5 MG: 5 TABLET ORAL at 04:10

## 2019-01-01 RX ADMIN — ENOXAPARIN SODIUM 40 MG: 40 INJECTION SUBCUTANEOUS at 08:06

## 2019-01-01 RX ADMIN — IPRATROPIUM BROMIDE AND ALBUTEROL SULFATE 3 ML: 2.5; .5 SOLUTION RESPIRATORY (INHALATION) at 14:12

## 2019-01-01 RX ADMIN — OXYCODONE HYDROCHLORIDE 2.5 MG: 5 TABLET ORAL at 15:45

## 2019-01-01 RX ADMIN — ACETYLCYSTEINE 600 MG: 200 SOLUTION ORAL; RESPIRATORY (INHALATION) at 23:29

## 2019-01-01 RX ADMIN — OXYCODONE HYDROCHLORIDE 2.5 MG: 5 TABLET ORAL at 08:42

## 2019-01-01 RX ADMIN — BUDESONIDE 0.5 MG: 0.5 INHALANT RESPIRATORY (INHALATION) at 07:36

## 2019-01-01 RX ADMIN — MELATONIN 6 MG: 3 TAB ORAL at 21:04

## 2019-01-01 RX ADMIN — FUROSEMIDE 60 MG: 10 INJECTION, SOLUTION INTRAMUSCULAR; INTRAVENOUS at 09:37

## 2019-01-01 RX ADMIN — LEVALBUTEROL HYDROCHLORIDE 0.63 MG: 0.63 SOLUTION RESPIRATORY (INHALATION) at 07:36

## 2019-01-01 RX ADMIN — IPRATROPIUM BROMIDE AND ALBUTEROL SULFATE 3 ML: 2.5; .5 SOLUTION RESPIRATORY (INHALATION) at 19:22

## 2019-01-01 RX ADMIN — LEVALBUTEROL HYDROCHLORIDE 0.63 MG: 0.63 SOLUTION RESPIRATORY (INHALATION) at 23:29

## 2019-01-01 RX ADMIN — PANTOPRAZOLE SODIUM 40 MG: 40 TABLET, DELAYED RELEASE ORAL at 08:06

## 2019-01-01 RX ADMIN — ACETAMINOPHEN 650 MG: 325 TABLET, FILM COATED ORAL at 13:09

## 2019-01-01 RX ADMIN — ACETYLCYSTEINE 3 ML: 200 SOLUTION ORAL; RESPIRATORY (INHALATION) at 07:35

## 2019-01-01 RX ADMIN — ENOXAPARIN SODIUM 40 MG: 40 INJECTION SUBCUTANEOUS at 09:02

## 2019-01-01 RX ADMIN — IPRATROPIUM BROMIDE AND ALBUTEROL SULFATE 3 ML: 2.5; .5 SOLUTION RESPIRATORY (INHALATION) at 01:27

## 2019-01-01 RX ADMIN — ENOXAPARIN SODIUM 40 MG: 40 INJECTION SUBCUTANEOUS at 09:40

## 2019-01-01 RX ADMIN — PRAMIPEXOLE DIHYDROCHLORIDE 0.12 MG: 0.25 TABLET ORAL at 20:08

## 2019-01-01 RX ADMIN — CEFEPIME HYDROCHLORIDE 2000 MG: 2 INJECTION, SOLUTION INTRAVENOUS at 15:05

## 2019-01-01 RX ADMIN — ACETAMINOPHEN 650 MG: 325 TABLET, FILM COATED ORAL at 18:36

## 2019-01-01 RX ADMIN — ACETAMINOPHEN 650 MG: 325 TABLET, FILM COATED ORAL at 06:18

## 2019-01-01 RX ADMIN — ACETAMINOPHEN 650 MG: 325 TABLET, FILM COATED ORAL at 17:04

## 2019-01-01 RX ADMIN — DOCUSATE SODIUM 100 MG: 100 CAPSULE, LIQUID FILLED ORAL at 09:40

## 2019-01-01 RX ADMIN — PRAMIPEXOLE DIHYDROCHLORIDE 0.12 MG: 0.25 TABLET ORAL at 09:42

## 2019-01-01 RX ADMIN — PRIMIDONE 50 MG: 50 TABLET ORAL at 21:05

## 2019-01-01 RX ADMIN — ACETAMINOPHEN 650 MG: 325 TABLET, FILM COATED ORAL at 05:09

## 2019-01-01 RX ADMIN — ALBUMIN (HUMAN) 50 G: 12.5 SOLUTION INTRAVENOUS at 16:56

## 2019-01-01 RX ADMIN — LORAZEPAM 0.5 MG: 2 INJECTION INTRAMUSCULAR; INTRAVENOUS at 12:48

## 2019-01-01 RX ADMIN — LEVALBUTEROL HYDROCHLORIDE 0.63 MG: 0.63 SOLUTION RESPIRATORY (INHALATION) at 23:28

## 2019-07-26 PROBLEM — M41.9 SCOLIOSIS: Chronic | Status: ACTIVE | Noted: 2018-04-30

## 2019-07-26 PROBLEM — E87.1 HYPONATREMIA: Status: ACTIVE | Noted: 2019-01-01

## 2019-07-26 PROBLEM — J96.02 ACUTE RESPIRATORY FAILURE WITH HYPERCAPNIA (HCC): Status: ACTIVE | Noted: 2018-05-01

## 2019-07-26 PROBLEM — N39.0 URINARY TRACT INFECTION WITHOUT HEMATURIA: Status: ACTIVE | Noted: 2019-01-01

## 2019-07-26 PROBLEM — R01.1 MURMUR: Status: ACTIVE | Noted: 2019-01-01

## 2019-07-26 PROBLEM — J96.00 ACUTE RESPIRATORY FAILURE (HCC): Status: ACTIVE | Noted: 2018-05-01

## 2019-07-26 PROBLEM — J30.9 ALLERGIC RHINITIS: Chronic | Status: ACTIVE | Noted: 2018-05-16

## 2019-07-26 PROBLEM — G25.0 BENIGN ESSENTIAL TREMOR: Chronic | Status: ACTIVE | Noted: 2018-04-30

## 2019-07-26 PROBLEM — I87.2 VENOUS INSUFFICIENCY OF BOTH LOWER EXTREMITIES: Chronic | Status: ACTIVE | Noted: 2018-04-30

## 2019-07-26 PROBLEM — E03.9 HYPOTHYROID: Chronic | Status: ACTIVE | Noted: 2018-04-30

## 2019-07-26 PROBLEM — G25.81 RESTLESS LEG: Chronic | Status: ACTIVE | Noted: 2018-04-30

## 2019-07-26 PROBLEM — H91.93 DECREASED HEARING OF BOTH EARS: Chronic | Status: ACTIVE | Noted: 2018-05-23

## 2019-07-26 PROBLEM — I50.30 DIASTOLIC HEART FAILURE (HCC): Status: ACTIVE | Noted: 2019-01-01

## 2019-07-26 NOTE — ASSESSMENT & PLAN NOTE
· Takes Mirapex for restless leg and Primidone for fine tremor, unknown home dose    · Will need to contact pharmacy today and verify dosing

## 2019-07-26 NOTE — RESPIRATORY THERAPY NOTE
Patient placed on bipap per md order and titrated for comfort and sp02  Settings at 14/6 35%  sp02 93-95% pt states breathing is more comfortable with mask

## 2019-07-26 NOTE — ED PROCEDURE NOTE
PROCEDURE  ECG 12 Lead Documentation Only  Date/Time: 7/26/2019 12:57 PM  Performed by: Tram Waggoner DO  Authorized by: Tram Waggoner DO     ECG reviewed by me, the ED Provider: yes    Patient location:  ED  Interpretation:     Interpretation: non-specific    Rate:     ECG rate:  94    ECG rate assessment: normal    Rhythm:     Rhythm: sinus rhythm    Ectopy:     Ectopy: none    ST segments:     ST segments:  Normal         Tram Waggoner DO  07/26/19 1258

## 2019-07-26 NOTE — ASSESSMENT & PLAN NOTE
Wt Readings from Last 3 Encounters:   07/29/19 67 5 kg (148 lb 13 oz)   05/23/18 62 1 kg (137 lb)   05/16/18 62 3 kg (137 lb 6 4 oz)     · severe pulmonary hypertension with estimated PAP 70s  · Pulmonary following  · Diuresis with minimal improvement in oxygen requirements

## 2019-07-26 NOTE — ASSESSMENT & PLAN NOTE
· Tylenol for pain  · Supportive care  · Scoliosis likely causing some pulmonary restrictive pathology   · Pulmonary hygiene

## 2019-07-26 NOTE — RESPIRATORY THERAPY NOTE
RT Protocol Note  Doroteo Roa 80 y o  female MRN: [id number]  Unit/Bed#: ED 10 Encounter: [id number]    Assessment    Active Problems:    * No active hospital problems  *      Home Pulmonary Medications:  Neb with xopenex/atrovent and pumicort at home  Past Medical History:   Diagnosis Date    Arthritis     Cellulitis     Disease of thyroid gland     Edema     lower ext   Parkinson disease ([address] )     Scoliosis      Social History     Socioeconomic History    Marital status:      Spouse name: None    Number of children: None    Years of education: None    Highest education level: None   Occupational History    None   Social Needs    Financial resource strain: None    Food insecurity:     Worry: None     Inability: None    Transportation needs:     Medical: None     Non-medical: None   Tobacco Use    Smoking status: Former Smoker     Packs/day: 0 25     Years: 10 00     Pack years: 2 50     Types: Cigarettes     Start date:      Last attempt to quit:      Years since quittin 6    Smokeless tobacco: Never Used   Substance and Sexual Activity    Alcohol use:  Yes     Alcohol/week: 0 0 standard drinks     Comment: social    Drug use: No    Sexual activity: None   Lifestyle    Physical activity:     Days per week: None     Minutes per session: None    Stress: None   Relationships    Social connections:     Talks on phone: None     Gets together: None     Attends Restorationist service: None     Active member of club or organization: None     Attends meetings of clubs or organizations: None     Relationship status: None    Intimate partner violence:     Fear of current or ex partner: None     Emotionally abused: None     Physically abused: None     Forced sexual activity: None   Other Topics Concern    None   Social History Narrative    None       Subjective         Objective    Physical Exam:        Vitals:  Blood pressure 125/68, pulse 94, temperature 99 °F (37 2 °C), temperature source Tympanic, resp  rate (!) 26, SpO2 93 %  Results from last 7 days   Lab Units 07/26/19  1403   PH ART  7 276*   PCO2 ART mm Hg 76 4*   PO2 ART mm Hg 138 6*   HCO3 ART mmol/L 34 8*   BASE EXC ART mmol/L 4 9   O2 CONTENT ART mL/dL 21 1   O2 HGB, ARTERIAL % 96 4   ABG SOURCE  Brachial, Right   WANDER TEST  Yes       Imaging and other studies: I have personally reviewed pertinent reports              Plan             Resp Comments: pt placed on bipap per md order

## 2019-07-26 NOTE — H&P
History and Physical - Critical Care/ Leslie Katia 80 y o  female MRN: [id number]  Unit/Bed#: ICU 05 Encounter: [id number]    Reason for Admission / Chief Complaint: shortness of breath    History of Present Illness:  Chiquita Oconnor is a 80 y o  female who presents with 1 week history of shortness of breath  Past medical history includes GERD, hypothyroidism, depression, and restless leg  Patient called EMS due to worsening shortness of breath and upon arrival, they noted that she was hypoxic with Sp02 in the 70s  In the ER she was placed on BiPAP for tachypnea and increased work of breathing  She received 125mg solumedrol, nebulizers and 20mg of lasix  CXR showed mild vascular congestion  BNP was noted to be 2,500 which was significantly higher than previous admission  She will be admitted to the step down unit for further workup and monitoring  History obtained from chart review and the patient  Past Medical History:  Past Medical History:   Diagnosis Date    Arthritis     Cellulitis     Cellulitis     Chest pain     Constipation     Cough     Depression     Disease of thyroid gland     Edema     lower ext      GERD (gastroesophageal reflux disease)     Hypothyroidism     [facility])     [facility] ([facility] [address] )     Scoliosis     Scoliosis     SOB (shortness of breath)     Tremor        Past Surgical History:  Past Surgical History:   Procedure Laterality Date    HYSTERECTOMY         Past Family History:  Family History   Family history unknown: Yes       Social History:  Social History     Tobacco Use   Smoking Status Former Smoker    Packs/day: 0 25    Years: 10 00    Pack years: 2 50    Types: Cigarettes    Start date:     Last attempt to quit:     Years since quittin 6   Smokeless Tobacco Never Used      Social History     Substance and Sexual Activity   Alcohol Use Yes    Alcohol/week: 0 0 standard drinks    Comment: social     Social History     Substance and Sexual Activity   Drug Use No     Marital Status:     Medications:  Current Facility-Administered Medications   Medication Dose Route Frequency    [START ON 7/27/2019] citalopram (CeleXA) tablet 10 mg  10 mg Oral Daily    [START ON 7/27/2019] docusate sodium (COLACE) capsule 100 mg  100 mg Oral Daily    [START ON 7/27/2019] enoxaparin (LOVENOX) subcutaneous injection 40 mg  40 mg Subcutaneous Daily    ipratropium-albuterol (DUO-NEB) 0 5-2 5 mg/3 mL inhalation solution 3 mL  3 mL Nebulization Q6H    [START ON 7/27/2019] levothyroxine tablet 75 mcg  75 mcg Oral Daily    [START ON 7/27/2019] pantoprazole (PROTONIX) EC tablet 40 mg  40 mg Oral Daily     Home medications:  Prior to Admission medications    Medication Sig Start Date End Date Taking?  Authorizing Provider   Acetaminophen (ARTHRITIS PAIN RELIEVER PO) Take 650 mg by mouth every 6 (six) hours as needed    Historical Provider, MD   albuterol (ACCUNEB) 1 25 MG/3ML nebulizer solution Take 1 ampule by nebulization every 6 (six) hours as needed for wheezing    Historical Provider, MD   Ascorbic Acid (VITAMIN C) 100 MG tablet Take 100 mg by mouth daily    Historical Provider, MD   benzonatate (TESSALON PERLES) 100 mg capsule Take 100 mg by mouth 3 (three) times a day as needed for cough    Historical Provider, MD   Calcium Carb-Cholecalciferol (CALCIUM+D3) 600-800 MG-UNIT TABS Take by mouth 2 (two) times a day    Historical Provider, MD   citalopram (CeleXA) 10 mg tablet Take 10 mg by mouth daily    Historical Provider, MD   docusate sodium (COLACE) 100 mg capsule Take 100 mg by mouth daily    Historical Provider, MD   fluticasone (FLONASE) 50 mcg/act nasal spray 1 spray into each nostril daily    Historical Provider, MD   furosemide (LASIX) 40 mg tablet Take 40 mg by mouth 2 (two) times a day    Historical Provider, MD   ipratropium (ATROVENT) 0 02 % nebulizer solution Take 0 5 mg by nebulization every 6 (six) hours as needed for wheezing or shortness of breath    Historical Provider, MD   levothyroxine 75 mcg tablet Take 75 mcg by mouth daily    Historical Provider, MD   Liniments (SALONPAS PAIN RELIEF PATCH EX) Apply topically daily as needed    Historical Provider, MD   nitroglycerin (NITROSTAT) 0 4 mg SL tablet Place 0 4 mg under the tongue every 5 (five) minutes as needed for chest pain    Historical Provider, MD   pantoprazole (PROTONIX) 40 mg tablet Take 40 mg by mouth daily    Historical Provider, MD   pramipexole (MIRAPEX) 0 125 mg tablet Take 0 125 mg by mouth 3 (three) times a day    Historical Provider, MD   primidone (MYSOLINE) 50 mg tablet Take by mouth 2 (two) times a day    Historical Provider, MD   Probiotic Product ([address]) CAPS Take by mouth    Historical Provider, MD   traMADol (ULTRAM) 50 mg tablet Take 50 mg by mouth 3 (three) times a day    Historical Provider, MD   budesonide (PULMICORT) 0 5 mg/2 mL nebulizer solution Take 2 mL (0 5 mg total) by nebulization every 12 (twelve) hours 5/8/18 7/26/19  Elroyus Hence, MD   cefpodoxime (VANTIN) 100 mg tablet Take 100 mg by mouth 2 (two) times a day  7/26/19  Historical Provider, MD   cetirizine (ZyrTEC) 10 MG chewable tablet Chew 0 5 tablets (5 mg total) daily 5/16/18 7/26/19  Mary Chavez PA-C   Cranberry 125 MG TABS Take by mouth  7/26/19  Historical Provider, MD   docusate sodium (COLACE) 100 mg capsule Take 100 mg by mouth 2 (two) times a day  7/26/19  Historical Provider, MD   fluticasone (FLONASE) 50 mcg/act nasal spray 1 spray into each nostril daily 5/16/18 7/26/19  Mary Chavez PA-C   furosemide (LASIX) 40 mg tablet Take 40 mg by mouth 2 (two) times a day  7/26/19  Historical Provider, MD   guaiFENesin 1200 MG TB12 Take 1 tablet (1,200 mg total) by mouth every 12 (twelve) hours 5/8/18 7/26/19  Elroyus Hence, MD   ipratropium (ATROVENT) 0 02 % nebulizer solution Take 2 5 mL (0 5 mg total) by nebulization every 6 (six) hours 5/8/18 7/26/19 Ara Flower MD   levalbuterol (XOPENEX) 1 25 mg/0 5 mL nebulizer solution Take 0 5 mL (1 25 mg total) by nebulization every 6 (six) hours as needed for wheezing 18  Ara Flower MD   nitroglycerin (NITROSTAT) 0 4 mg SL tablet Place 1 tablet (0 4 mg total) under the tongue every 5 (five) minutes as needed for chest pain 18  Ara Flower MD   pantoprazole (PROTONIX) 40 mg tablet Take 1 tablet (40 mg total) by mouth daily in the early morning 18  Ara Flower MD   predniSONE 20 mg tablet Take 2 tablets (40 mg total) by mouth daily 18  Ara Flower MD     Allergies: Allergies   Allergen Reactions    Sulfa Antibiotics        ROS:   Review of Systems   Constitutional: Negative for activity change, appetite change, chills, diaphoresis and fever  HENT: Negative  Respiratory: Positive for shortness of breath  Negative for cough, chest tightness and wheezing  Cardiovascular: Negative  Negative for chest pain and palpitations  Gastrointestinal: Negative  Negative for abdominal distention, abdominal pain, diarrhea and vomiting  Endocrine: Negative  Genitourinary: Negative  Musculoskeletal: Negative  Allergic/Immunologic: Negative  Neurological:        Patient states she felt more confused than baseline   Hematological: Negative  Vitals:  Vitals:    19 1700 19 1725 19 1800 19 1844   BP: 125/69  109/66 124/63   BP Location:       Pulse: 78 72 82 66   Resp: (!) 27 17 (!) 42 (!) 23   Temp:  97 6 °F (36 4 °C)     TempSrc:  Temporal     SpO2: 96%  95% 97%   Weight:  66 kg (145 lb 8 1 oz)     Height:  4' 10" (1 473 m)       Temperature:   Temp (24hrs), Av 3 °F (36 8 °C), Min:97 6 °F (36 4 °C), Max:99 °F (37 2 °C)    Current Temperature: 97 6 °F (36 4 °C)    Weights:   IBW: 40 9 kg  Body mass index is 30 41 kg/m²      Hemodynamic Monitoring:  N/A     Non-Invasive/Invasive Ventilation Settings:  Respiratory Lab Data (Last 4 hours)    None         O2/Vent Data (Last 4 hours)      07/26 1645          Non-Invasive Ventilation Mode BiPAP                 Lab Results   Component Value Date    PHART 7 276 (L) 07/26/2019    AUL6OOQ 76 4 (HH) 07/26/2019    PO2ART 138 6 (H) 07/26/2019    SDH1NQL 34 8 (H) 07/26/2019    BEART 4 9 07/26/2019    SOURCE Brachial, Right 07/26/2019     SpO2: SpO2: 97 %     Physical Exam:  Physical Exam   Constitutional: She is oriented to person, place, and time  She appears well-developed and well-nourished  No distress  HENT:   Head: Normocephalic and atraumatic  Eyes: Pupils are equal, round, and reactive to light  EOM are normal    Neck: Normal range of motion  Neck supple  No JVD present  Cardiovascular: Normal rate and regular rhythm  Murmur heard  Pulmonary/Chest: No accessory muscle usage  No respiratory distress  She has decreased breath sounds  She has no wheezes  Assessed on BiPAP  Shallow breaths    Abdominal: Soft  Bowel sounds are normal  She exhibits no distension  There is no tenderness  Musculoskeletal: Normal range of motion  +1 lower extremity edema   Neurological: She is alert and oriented to person, place, and time  Skin: Skin is warm and dry  Capillary refill takes less than 2 seconds  She is not diaphoretic  No erythema         Labs:  Results from last 7 days   Lab Units 07/26/19  1259   WBC Thousand/uL 10 72*   HEMOGLOBIN g/dL 14 9   HEMATOCRIT % 46 4*   PLATELETS Thousands/uL 340   NEUTROS PCT % 77*   MONOS PCT % 9      Results from last 7 days   Lab Units 07/26/19  1259   SODIUM mmol/L 123*   POTASSIUM mmol/L 4 1   CHLORIDE mmol/L 83*   CO2 mmol/L 37*   BUN mg/dL 17   CREATININE mg/dL 0 54*   CALCIUM mg/dL 8 8   ALK PHOS U/L 81   ALT U/L 48   AST U/L 35     Results from last 7 days   Lab Units 07/26/19  1259   MAGNESIUM mg/dL 2 2          Results from last 7 days   Lab Units 07/26/19  1259   INR  1 02   PTT seconds 29     Results from last 7 days   Lab Units 07/26/19  1259   LACTIC ACID mmol/L 1 3     0   Lab Value Date/Time    TROPONINI 0 07 (H) 07/26/2019 1259    TROPONINI 0 04 05/04/2018 0642    TROPONINI 0 03 05/04/2018 0337    TROPONINI 0 05 (H) 05/04/2018 0025    TROPONINI 0 04 04/30/2018 1535       Imaging: CXR: Mild vascular congestion I have personally reviewed pertinent reports  and I have personally reviewed pertinent films in PACS  EKG: NSR on telemetry  This was personally reviewed by myself  Micro:  Blood Culture:   Lab Results   Component Value Date    BLOODCX No Growth After 5 Days  04/30/2018    BLOODCX No Growth After 5 Days  04/30/2018     Urine Culture: No results found for: URINECX  Sputum Culture: No components found for: SPUTUMCX  Wound Culure: No results found for: WOUNDCULT  ______________________________________________________________________    Assessment:   Principal Problem:    Acute respiratory failure with hypoxia (HCC)  Active Problems:    Diastolic heart failure (HCC)    Hyponatremia    Murmur    Hypothyroid    Scoliosis    Restless leg  Resolved Problems:    * No resolved hospital problems  *      * Acute respiratory failure with hypoxia (HCC)  Assessment & Plan  · Patient presents with shortness of breath x 1 week  EMS found her hypoxic with Sp02 70s   · Patient tachypneic with RR in the 40s in the ER  Placed on BiPAP 14/6 and 35% for increased work of breathing   · CXR shows mild vascular congestion  · ABG 7 27/77/139/35 (4 8) on BiPAP  · BNP 2000  · Likely diastolic heart failure exacerbation, no documented history of CHF  · Will check echocardiogram  · Continue BiPAP for now, scheduled duonebs  · Pulmonary hygiene     Diastolic heart failure (HCC)  Assessment & Plan  Wt Readings from Last 3 Encounters:   07/26/19 66 kg (145 lb 8 1 oz)   05/23/18 62 1 kg (137 lb)   05/16/18 62 3 kg (137 lb 6 4 oz)       · Clinical picture correlates with diastolic heart failure, however no history of CHF per chart   Patient is on 40mg of lasix BID as an outpatient  · BNP 2,000 on admission  · Given 20mg lasix in the ER, will given an additional 20mg now and assess urine output  · Daily weights      Hyponatremia  Assessment & Plan  · Sodium 123 on admission  · Previous sodium 1 year ago was normal  · Will check urine sodium, urine osmo  · Patient appears euvolemic  · CXR with mild vascular congestion   · Given 20mg lasix in the ER, monitor BMP q4 for now    Murmur  Assessment & Plan  · Echocardiogram ordered to monitor for valvular disease  · No documented echocardiogram for review    Hypothyroid  Assessment & Plan  · Continue synthroid     Restless leg  Assessment & Plan  · Takes mirapex for restless leg and primidone for fine tremor, unknown home dose   Will need to contact pharmacy and verify dosing    Scoliosis  Assessment & Plan  · Supportive care  · Pulmonary hygiene         Plan:    Neuro:   · Neuro checks per routine  · [facility] ICU    CV:   · Currently sinus rhythm on telemetry   · BP stable  · Murmur present on exam, will check echo   · Clinical picture correlates with diastolic heart failure    Pulm:   · Acute hypoxemic hypercapneic respiratory failure now on BiPAP  · Likely secondary to volume overload, will given lasix  · duonebs  · Pulmonary hygiene    GI:   · No acute issues  · NPO while on BiPAP  · Resume home protonix     :  · Bladder scanned for >400, will insert hansen  · Close I&Os   · Daily weights    FEN:   · Given 20mg lasix in the ER, give an additional 20mg now for volume overload  · Hyponatremia on admission blood work, will repeat and monitor  · Monitor and trend electrolytes: Goal K>4, Mg >2    ID:   · No signs/symptoms of infection  · Will send procalcitonin now and in the morning  · Given 1 dose of cefepime in the ER, monitor off antibiotics now  · Monitor WBC and fever curve    Heme:   · Stable, no acute issues  · lovenox for DVT ppx    Endo:   · Blood glucose stable  · Resume home synthroid    MSK/Skin:   · Frequent turns/skin assessments     Family:  · Family updated: yes     Disposition: Admit to Stepdown    Counseling / Coordination of Care  Total time spent today 39 minutes  Greater than 50% of total time was spent with the patient and / or family counseling and / or coordination of care    ______________________________________________________________________    VTE Pharmacologic Prophylaxis: Enoxaparin (Lovenox)  VTE Mechanical Prophylaxis: sequential compression device    Invasive lines and devices: Invasive Devices     Peripheral Intravenous Line            Peripheral IV 07/26/19 Left Antecubital less than 1 day    Peripheral IV 07/26/19 Left Wrist less than 1 day          Drain            Urethral Catheter Latex 16 Fr  less than 1 day                Code Status: Level 2 - DNAR: but accepts endotracheal intubation  POA:    POLST:      Given critical illness, patient length of stay will require greater than two midnights      Signature: ARNIE Berkowitz  Date: 7/26/2019

## 2019-07-26 NOTE — ED NOTES
Eliza Dash, RRT, placed pt on BiPap  Pt RR 26   Pt in less distress  Will continue to monitor       Mateusz Kruse RN  07/26/19 6553

## 2019-07-26 NOTE — ASSESSMENT & PLAN NOTE
· Hypo-osmolar hyponatremia, serum osmo 280  · Urin osmo 351, urine sodium 39 (after receiving lasix)  · Patient without symptoms  · Continue to monitor q12  · Continue diuresis

## 2019-07-26 NOTE — ED PROVIDER NOTES
History  Chief Complaint   Patient presents with    Shortness of Breath     increasing SOB for past several weeks  BLS gave solumedrol and albuterol and pulseox increased from 70s to 90s  Patient presents for evaluation of increasing dyspnea for the last week worse today  Denies chest pain  No fever or chills  EMS reports initially oxygenation 70s arrives with nebulizer in place with saturation in the 90s  Patient received nubulizer and Solumedrol prior to arrival        History provided by:  Patient, EMS personnel and medical records   used: No    Shortness of Breath   Associated symptoms: wheezing    Associated symptoms: no chest pain and no fever        Prior to Admission Medications   Prescriptions Last Dose Informant Patient Reported? Taking?    Acetaminophen (ARTHRITIS PAIN RELIEVER PO)   Yes Yes   Sig: Take 650 mg by mouth every 6 (six) hours as needed   Ascorbic Acid (VITAMIN C) 100 MG tablet   Yes Yes   Sig: Take 100 mg by mouth daily   Calcium Carb-Cholecalciferol (CALCIUM+D3) 600-800 MG-UNIT TABS   Yes Yes   Sig: Take by mouth 2 (two) times a day   Liniments (SALONPAS PAIN RELIEF PATCH EX)   Yes Yes   Sig: Apply topically daily as needed   Probiotic Product ([address]) CAPS   Yes Yes   Sig: Take by mouth   benzonatate (TESSALON PERLES) 100 mg capsule   Yes Yes   Sig: Take 100 mg by mouth 3 (three) times a day as needed for cough   levothyroxine 75 mcg tablet Not Taking at Unknown time  Yes No   Sig: Take 75 mcg by mouth daily   pramipexole (MIRAPEX) 0 125 mg tablet   Yes Yes   Sig: Take 0 125 mg by mouth 3 (three) times a day   primidone (MYSOLINE) 50 mg tablet   Yes Yes   Sig: Take by mouth 2 (two) times a day   traMADol (ULTRAM) 50 mg tablet   Yes Yes   Sig: Take 50 mg by mouth 3 (three) times a day      Facility-Administered Medications: None       Past Medical History:   Diagnosis Date    Arthritis     Cellulitis     Disease of thyroid gland     Edema     lower ext     Parkinson disease ([facility] [address] )     Scoliosis        Past Surgical History:   Procedure Laterality Date    HYSTERECTOMY         Family History   Family history unknown: Yes     I have reviewed and agree with the history as documented  Social History     Tobacco Use    Smoking status: Former Smoker     Packs/day: 0 25     Years: 10 00     Pack years: 2 50     Types: Cigarettes     Start date:      Last attempt to quit:      Years since quittin 6    Smokeless tobacco: Never Used   Substance Use Topics    Alcohol use: Yes     Alcohol/week: 0 0 standard drinks     Comment: social    Drug use: No        Review of Systems   Constitutional: Negative for chills and fever  Respiratory: Positive for shortness of breath and wheezing  Cardiovascular: Negative for chest pain  All other systems reviewed and are negative  Physical Exam  Physical Exam   Constitutional: She is oriented to person, place, and time  She appears distressed  HENT:   Mouth/Throat: Oropharynx is clear and moist    Eyes: Pupils are equal, round, and reactive to light  Neck: Normal range of motion  Cardiovascular: Normal rate, regular rhythm and intact distal pulses  Pulmonary/Chest: She is in respiratory distress  She has wheezes  Moderate respiratory distress increased work of breathing with tachypnea  Decreased air movement bilaterally with expiratory wheezing   Abdominal: Soft  There is no tenderness  Musculoskeletal: Normal range of motion  Neurological: She is alert and oriented to person, place, and time  Skin: Capillary refill takes less than 2 seconds  She is not diaphoretic  Nursing note and vitals reviewed        Vital Signs  ED Triage Vitals [19 1247]   Temperature Pulse Respirations Blood Pressure SpO2   99 °F (37 2 °C) 97 (!) 28 150/73 92 %      Temp Source Heart Rate Source Patient Position - Orthostatic VS BP Location FiO2 (%)   Tympanic Monitor Sitting Right arm --      Pain Score No Pain           Vitals:    07/26/19 1445 07/26/19 1515 07/26/19 1530 07/26/19 1552   BP: 125/68 104/62 110/61 112/68   Pulse: 94 90 92 71   Patient Position - Orthostatic VS:    Lying         Visual Acuity      ED Medications  Medications   ipratropium-albuterol (DUO-NEB) 0 5-2 5 mg/3 mL inhalation solution 3 mL (3 mL Nebulization Given 7/26/19 1303)   methylPREDNISolone sodium succinate (FOR EMS ONLY) (Solu-MEDROL) 125 MG injection 125 mg (0 mg Does not apply Given to EMS 7/26/19 1252)   albuterol (FOR EMS ONLY) (2 5 mg/3 mL) 0 083 % inhalation solution 5 mg (0 mg Does not apply Given to EMS 7/26/19 1252)   albuterol (FOR EMS ONLY) (2 5 mg/3 mL) 0 083 % inhalation solution 5 mg (0 mg Does not apply Given to EMS 7/26/19 1252)   furosemide (LASIX) injection 20 mg (20 mg Intravenous Given 7/26/19 1400)   ipratropium-albuterol (DUO-NEB) 0 5-2 5 mg/3 mL inhalation solution 3 mL (3 mL Nebulization Given 7/26/19 1359)   cefepime (MAXIPIME) 2 g/50 mL dextrose IVPB (0 mg Intravenous Stopped 7/26/19 1544)       Diagnostic Studies  Results Reviewed     Procedure Component Value Units Date/Time    Blood gas, arterial [[id number]]  (Abnormal) Collected:  07/26/19 1403    Lab Status:  Final result Specimen:  Blood, Arterial from Brachial, Right Updated:  07/26/19 1417     pH, Arterial 7 276     PH ART TC 7 273     pCO2, Arterial 76 4 mm Hg      PCO2 (TC) Arterial 77 1 mm Hg      pO2, Arterial 138 6 mm Hg      PO2 (TC) Arterial 139 8 mm Hg      HCO3, Arterial 34 8 mmol/L      Base Excess, Arterial 4 9 mmol/L      O2 Content, Arterial 21 1 mL/dL      O2 HGB,Arterial  96 4 %      SOURCE Brachial, Right     WANDER TEST Yes     Temperature 99 0 Degrees Fehrenheit      Nasal Cannula 6    Comprehensive metabolic panel [[id number]]  (Abnormal) Collected:  07/26/19 1259    Lab Status:  Final result Specimen:  Blood from Arm, Left Updated:  07/26/19 1332     Sodium 123 mmol/L      Potassium 4 1 mmol/L      Chloride 83 mmol/L      CO2 37 mmol/L      ANION GAP 3 mmol/L      BUN 17 mg/dL      Creatinine 0 54 mg/dL      Glucose 119 mg/dL      Calcium 8 8 mg/dL      AST 35 U/L      ALT 48 U/L      Alkaline Phosphatase 81 U/L      Total Protein 7 3 g/dL      Albumin 3 4 g/dL      Total Bilirubin 0 40 mg/dL      eGFR 82 ml/min/1 73sq m     Narrative:       [place] guidelines for Chronic Kidney Disease (CKD):     Stage 1 with normal or high GFR (GFR > 90 mL/min/1 73 square meters)    Stage 2 Mild CKD (GFR = 60-89 mL/min/1 73 square meters)    Stage 3A Moderate CKD (GFR = 45-59 mL/min/1 73 square meters)    Stage 3B Moderate CKD (GFR = 30-44 mL/min/1 73 square meters)    Stage 4 Severe CKD (GFR = 15-29 mL/min/1 73 square meters)    Stage 5 End Stage CKD (GFR <15 mL/min/1 73 square meters)  Note: GFR calculation is accurate only with a steady state creatinine    B-type natriuretic peptide [[id number]]  (Abnormal) Collected:  07/26/19 1259    Lab Status:  Final result Specimen:  Blood from Arm, Left Updated:  07/26/19 1332     NT-proBNP 2,464 pg/mL     Magnesium [[id number]]  (Normal) Collected:  07/26/19 1259    Lab Status:  Final result Specimen:  Blood from Arm, Left Updated:  07/26/19 1332     Magnesium 2 2 mg/dL     Troponin I [[id number]]  (Abnormal) Collected:  07/26/19 1259    Lab Status:  Final result Specimen:  Blood from Arm, Left Updated:  07/26/19 1331     Troponin I 0 07 ng/mL     Lactic acid, plasma [[id number]]  (Normal) Collected:  07/26/19 1259    Lab Status:  Final result Specimen:  Blood from Arm, Left Updated:  07/26/19 1328     LACTIC ACID 1 3 mmol/L     Narrative:       Result may be elevated if tourniquet was used during collection      [place] [facility] [[id number]]  (Normal) Collected:  07/26/19 1259    Lab Status:  Final result Specimen:  Blood from Arm, Left Updated:  07/26/19 1322     Protime 10 7 seconds      INR 1 02    APTT [[id number]]  (Normal) Collected:  07/26/19 1259    Lab Status:  Final result Specimen:  Blood from Arm, Left Updated:  07/26/19 1322     PTT 29 seconds     Blood culture #1 [[id number]] Collected:  07/26/19 1310    Lab Status: In process Specimen:  Blood from Arm, Right Updated:  07/26/19 1313    CBC and differential [[id number]]  (Abnormal) Collected:  07/26/19 1259    Lab Status:  Final result Specimen:  Blood from Arm, Left Updated:  07/26/19 1308     WBC 10 72 Thousand/uL      RBC 4 75 Million/uL      Hemoglobin 14 9 g/dL      Hematocrit 46 4 %      MCV 98 fL      MCH 31 4 pg      MCHC 32 1 g/dL      RDW 13 6 %      MPV 9 0 fL      Platelets 602 Thousands/uL      nRBC 0 /100 WBCs      Neutrophils Relative 77 %      Immat GRANS % 1 %      Lymphocytes Relative 13 %      Monocytes Relative 9 %      Eosinophils Relative 0 %      Basophils Relative 0 %      Neutrophils Absolute 8 29 Thousands/µL      Immature Grans Absolute 0 06 Thousand/uL      Lymphocytes Absolute 1 41 Thousands/µL      Monocytes Absolute 0 92 Thousand/µL      Eosinophils Absolute 0 01 Thousand/µL      Basophils Absolute 0 03 Thousands/µL     Blood culture #2 [[id number]] Collected:  07/26/19 1259    Lab Status: In process Specimen:  Blood from Arm, Left Updated:  07/26/19 1307    UA w Reflex to Microscopic [[id number]]     Lab Status:  No result Specimen:  Urine                  XR chest 1 view portable   Final Result by Keith Sibley MD (07/26 1340)      Increased interstitial and vascular congestion with bibasilar densities and probable small effusions  Workstation performed: [id number]                    Procedures  Procedures       ED Course                               MDM  Number of Diagnoses or Management Options  CHF (congestive heart failure) ([facility] [address] ):   COPD with acute exacerbation ([facility] [address] ): Hypoxia:   Diagnosis management comments: Pulse ox 96% on RA indicating adequate oxygenation  CXR: PVC, no focal infiltrate as read by me      Patient remained tachypneic after initial breathing treatments   ABG showed acute respiratory acidosis with CO2 retention, placed on BiPAP  Tolerated well and respiratory rate and effort improved  Amount and/or Complexity of Data Reviewed  Clinical lab tests: ordered and reviewed  Tests in the radiology section of CPT®: ordered and reviewed  Decide to obtain previous medical records or to obtain history from someone other than the patient: yes  Review and summarize past medical records: yes  Discuss the patient with other providers: yes  Independent visualization of images, tracings, or specimens: yes    Patient Progress  Patient progress: stable      Disposition  Final diagnoses:   Hypoxia   COPD with acute exacerbation ([facility] )   CHF (congestive heart failure) ([place] [address] )     Time reflects when diagnosis was documented in both MDM as applicable and the Disposition within this note     Time User Action Codes Description Comment    7/26/2019  3:19 PM Veneda  Add [R09 02] Hypoxia     7/26/2019  3:19 PM Cesar, [address] [J44 1] COPD with acute exacerbation ([facility] )     7/26/2019  3:19 PM Cesar, [address] [I50 9] CHF (congestive heart failure) [facility])       ED Disposition     ED Disposition Condition Date/Time Comment    Admit Stable Fri Jul 26, 2019  3:19 PM Case was discussed with AUDREY Toscano and the patient's admission status was agreed to be Admission Status: inpatient status to the service of Dr Mesfin Kent   Follow-up Information    None         Patient's Medications   Discharge Prescriptions    No medications on file     No discharge procedures on file      ED Provider  Electronically Signed by           Vane Huynh DO  07/26/19 8911

## 2019-07-26 NOTE — ASSESSMENT & PLAN NOTE
· Still requiring HFNC with minimal improvement in oxygen needs  · Echo showed EF 55% with pulmonary hypertension with estimated PAP of 70mmHg  EF 55%  Dilated vena cava  · After further discussion with family yesterday, they are in agreement with transitioning to hospice however they are waiting for another family member  Patient herself also is in agreement with hospice

## 2019-07-27 NOTE — PHYSICAL THERAPY NOTE
PT EVALUATION     07/27/19 1520   Note Type   Note type Eval/Treat   Pain Assessment   Pain Assessment No/denies pain   Pain Score No Pain   Home Living   Type of Home Assisted living  (lives at [facility])   Home Layout One level;Elevator   Bathroom Shower/Tub Tub/shower unit   Bathroom Toilet Standard   Bathroom Equipment Shower chair   [facility]; Wheelchair-manual   Prior Function   Level of [place] Independent with ADLs and functional mobility; Needs assistance with IADLs   Lives With Alone; Facility staff   Receives Help From Other (Comment)  (facility staff)   ADL Assistance Independent   IADLs Needs assistance   Falls in the last 6 months 1 to 4   Restrictions/Precautions   Weight Bearing Precautions Per Order No   Other Precautions Chair Alarm; Bed Alarm;Multiple lines;Telemetry;O2;Fall Risk   General   Additional Pertinent History Pt admitted due to hypoxia and COPD exacerbation   Family/Caregiver Present Yes   Cognition   Overall Cognitive Status WFL   RUE Assessment   RUE Assessment WFL   LUE Assessment   LUE Assessment WFL   RLE Assessment   RLE Assessment WFL   LLE Assessment   LLE Assessment WFL   Bed Mobility   Rolling R 4  Minimal assistance   Rolling L 4  Minimal assistance   Supine to Sit 3  Moderate assistance   Sit to Supine 3  Moderate assistance   Transfers   Sit to Stand Unable to assess  (BP decreasing with supine to sit transfer 74/47mmHg)   Ambulation/Elevation   Gait Assistance Not tested   Balance   Static Sitting Fair +   Dynamic Sitting Fair   Endurance Deficit   Endurance Deficit Yes   Endurance Deficit Description pt fatigues easily   Pt on 3L O2 NC   Activity Tolerance   Activity Tolerance Patient limited by fatigue;Treatment limited secondary to medical complications (Comment)  (BP drop with supine to sit 74/47mmHg)   Nurse Made Aware Diane MATIAS   Assessment   Prognosis Good   Problem List Decreased strength;Decreased range of motion;Decreased endurance; Impaired balance;Decreased mobility   Assessment Patient seen for Physical Therapy evaluation  Patient admitted with Acute respiratory failure with hypoxia ([facility] [address] )  Comorbidities affecting patient's physical performance include: arthritis, chronic back pain, COPD and osteoporosis, scoliosis  Personal factors affecting patient at time of initial evaluation include: ambulating with assistive device, inability to ambulate household distances, inability to navigate community distances, inability to perform dynamic tasks in community, inability to perform ADLS and inability to perform IADLS   Prior to admission, patient was independent with functional mobility with RW, independent with ADLS, requiring assist for IADLS, ambulating household distance and an assisted living resident  Please find objective findings from Physical Therapy assessment regarding body systems outlined above with impairments and limitations including weakness, impaired balance, decreased endurance, decreased activity tolerance, decreased functional mobility tolerance and fall risk  The Barthel Index was used as a functional outcome tool presenting with a score of 25 today indicating marked limitations of functional mobility and ADLS  Patient's clinical presentation is currently unstable/unpredictable as seen in patient's presentation of increased fall risk, new onset of impairment of functional mobility, decreased endurance and new onset of weakness  Pt would benefit from continued Physical Therapy treatment to address deficits as defined above and maximize level of functional mobility  As demonstrated by objective findings, the assigned level of complexity for this evaluation is high     Goals   Patient Goals "I dont want to become weak"   STG Expiration Date 08/03/19   Short Term Goal #1 1) Bed mobility, CG to decrease risk of skin breakdown   Short Term Goal #2 2) transfers, supine to sit Jose to improve mobility LTG Expiration Date 08/10/19   Long Term Goal #1 1) transfers, supervision to progress to PFL   Long Term Goal #2 2) gait with RW, 25'x1, min A to be able to return to halfway   Treatment Day 1   Plan   Treatment/Interventions Functional transfer training;LE strengthening/ROM; Therapeutic exercise; Endurance training;Patient/family training;Bed mobility;Gait training;Spoke to nursing;Spoke to case management;OT;Family   PT Frequency 5x/wk   Recommendation   Recommendation Short-term skilled PT; Home PT   Additional Comments pt prefers to return to halfway with PT option   Barthel Index   Feeding 5   Bathing 0   Grooming Score 0   Dressing Score 5   Bladder Score 0   Bowels Score 10   Toilet Use Score 0   Transfers (Bed/Chair) Score 5   Mobility (Level Surface) Score 0   Stairs Score 0   Barthel Index Score 22   Licensure   [place] License Number  [place] [id number]       PT treatment: [phone])  S: "I dont want to become weak laying in bed"  O: Supine therex: ankle pumps, heel slides, glut sets x 10  Bed mobility, mod A  Transfers supine to sit, max A  BP decreased to 74/47  RNBritney made aware  Pulse ox on 3 L O2 94-96%  Seated LE therex at EOB, ankle pumps, knee ext x 10 reps with HR 74bpm  RN advised to return pt back to supine due to decrease in BP  A: pt appropriate for continued PT    P: DC to [facility] with PT option vs STR   [place], [place] [id number]

## 2019-07-27 NOTE — PROGRESS NOTES
Daily Progress Note - Critical Care/ Elva Rolla 80 y o  female MRN: [id number]  Unit/Bed#: ICU 05 Encounter: [id number]    ______________________________________________________________________  Assessment:   Principal Problem:    Acute respiratory failure with hypoxia (HCC)  Active Problems:    Diastolic heart failure (HCC)    Hyponatremia    Murmur    Urinary tract infection without hematuria    Scoliosis    Restless leg    Hypothyroidism  Resolved Problems:    * No resolved hospital problems  *      * Acute respiratory failure with hypoxia (HCC)  Assessment & Plan  · Patient tachypneic with RR in the 40s in the ER  Placed on BiPAP 14/6 and 35% for increased work of breathing   · CXR showed mild vascular congestion and was given Lasix 20 mg IV x 2  · Suspect diastolic heart failure exacerbation, but no documented history of CHF- overnight pt without any urine output may be secondary to too much diuresis, given  cc with some minimal improvement in urine  · Will check echocardiogram  · Continue BiPAP for now, scheduled duonebs- will attempt to wean off in am  · Cont pulm toilet    Diastolic heart failure (HCC)  Assessment & Plan  Wt Readings from Last 3 Encounters:   07/26/19 66 kg (145 lb 8 1 oz)   05/23/18 62 1 kg (137 lb)   05/16/18 62 3 kg (137 lb 6 4 oz)     · Clinical picture correlates with diastolic heart failure, however no history of CHF per chart  Patient is on 40mg of Lasix BID as an outpatient   Given 2 doses of Lasix 20mg IV since arrival  · BNP 2,000 on admission  · Cont Daily weights, strict I/O  · May have been diuresed too much and needed some IVF replacement secondary to low U O      Hyponatremia  Assessment & Plan  · Sodium down to 120 overnight, may be from tto much diuresis,  cc given overnight, may need some fluid back  · Previous sodium 1 year ago was normal  · Patient appears euvolemic but with no urine output throughout the night  · CXR with mild vascular congestion   · Received 20mg lasix in the ER and another 20 mg in ICU  · Monitor BMP q4 for now    Murmur  Assessment & Plan  · Echocardiogram ordered to monitor for valvular disease  · No documented echocardiogram for review    Urinary tract infection without hematuria  Assessment & Plan  · U/A positive with cath insertion, culture sent  · Cont on Cefepime, narrow once culture returns    Hypothyroidism  Assessment & Plan  · Continue home Synthroid dose    Restless leg  Assessment & Plan  · Takes Mirapex for restless leg and Primidone for fine tremor, unknown home dose  · Will need to contact pharmacy today and verify dosing    Scoliosis  Assessment & Plan  · Supportive care  · Pulmonary hygiene         Plan:    Neuro:   · Delirium: CAM ICU positive no   · No c/o pain  · Regulate sleep/wake cycle    CV:   · Rhythm: NSR  · Follow rhythm on telemetry    Pulm:   · Wean off Bipap this am and trial on NC    GI:   · Nutrition/diet plan: NPO until off bipap then regular diet  · Stress ulcer prophylaxis: No prophylaxis needed  · Bowel regimen: None currently  · Last BM: prior to admission    FEN:   · Hold Lasix for now  Repeat BMP pending this am  · Fluid/Diuretic plan: No intervention  · Electrolytes repleted: yes  · Goal: K >4 0, Mag >2 0, and Phos >3 0    :   · Indwelling Roberto present: yes   · Urinary catheter still needed for Strict I and O in a critically ill patient      ID:   · Cont Abx for UTI, culture pending  · Abx ordered: Cefepime  · Day # 2 of 5 day course  · Trend temps and WBC count    Heme:   · Hgb stable  · Trend hgb and plts as needed    Endo:   · BS controlled, no hx DM    Msk/Skin:  · Mobility goal: OOB as tolerated  · PT consult: yes  · OT consult: yes  · Frequent turning and off-loading    Family:  · Family updated within 24 hours: yes   · Family meeting planned today: no     Lines:  · PIV    VTE Prophylaxis:  · Pharmacologic Prophylaxis: Enoxaparin (Lovenox)  · Mechanical Prophylaxis: sequential compression device    Disposition: Continue Stepdown    Code Status: Level 2 - DNAR: but accepts endotracheal intubation    ______________________________________________________________________    HPI/24hr events: No major events overnight; had low urine output and required some fluid boluses/ Denies any c/o chest pain, states her breathing is better, denies any abd pain, N/V or diarrhea; Denies any dizziness or weakness; tolerating bipap    ______________________________________________________________________    Physical Exam:   Physical Exam   Constitutional: She is oriented to person, place, and time  She appears well-developed and well-nourished  No distress  HENT:   Head: Normocephalic  Eyes: Pupils are equal, round, and reactive to light  EOM are normal    Neck: Normal range of motion  Cardiovascular: Normal rate and regular rhythm  Pulmonary/Chest: Effort normal  No respiratory distress  She has decreased breath sounds in the right lower field and the left lower field  Abdominal: Soft  Bowel sounds are normal  She exhibits no distension  There is no tenderness  Musculoskeletal: She exhibits edema ( +1 lower extremity)  Neurological: She is alert and oriented to person, place, and time  Skin: Skin is warm and dry  Capillary refill takes less than 2 seconds  She is not diaphoretic  Psychiatric: She has a normal mood and affect  Her behavior is normal    Nursing note and vitals reviewed        ______________________________________________________________________  Vitals:    19 2300 19 2333 19 0100 19 0128   BP: 100/57  112/67    BP Location: Right arm  Right arm    Pulse: 87 66 71 68   Resp: (!) 36 (!) 28 (!) 29 (!) 28   Temp:       TempSrc:       SpO2: 94% 96% 95% 96%   Weight:       Height:                  Temperature:   Temp (24hrs), Av 4 °F (36 9 °C), Min:97 6 °F (36 4 °C), Max:99 °F (37 2 °C)    Current Temperature: 98 5 °F (36 9 °C)    Weights:   IBW: 40 9 kg    Body mass index is 30 41 kg/m²  Weight (last 2 days)     Date/Time   Weight    07/26/19 1725   66 (145 5)              Hemodynamic Monitoring:  N/A       Non-Invasive/Invasive Ventilation Settings:  Respiratory    Lab Data (Last 4 hours)    None         O2/Vent Data (Last 4 hours)    None              Lab Results   Component Value Date    PHART 7 372 07/26/2019    QNJ7BQT 65 1 (HH) 07/26/2019    PO2ART 135 2 (H) 07/26/2019    UBW9NPO 37 0 (H) 07/26/2019    BEART 9 0 07/26/2019    SOURCE Radial, Right 07/26/2019     SpO2: SpO2: 96 %, SpO2 Device: O2 Device: Other (comment)(bipap)    Intake and Outputs:  I/O       07/25 0701 - 07/26 0700 07/26 0701 - 07/27 0700    I V  (mL/kg)  270 (4 1)    IV Piggyback  150    Total Intake(mL/kg)  420 (6 4)    Urine (mL/kg/hr)  785    Total Output  785    Net  -365                Nutrition:        Diet Orders   (From admission, onward)             Start     Ordered    07/26/19 1735  Diet NPO  Diet effective now     Question Answer Comment   Diet Type NPO    RD to adjust diet per protocol?  Yes        07/26/19 1735              Labs:   Results from last 7 days   Lab Units 07/26/19  1259   WBC Thousand/uL 10 72*   HEMOGLOBIN g/dL 14 9   HEMATOCRIT % 46 4*   PLATELETS Thousands/uL 340   NEUTROS PCT % 77*   MONOS PCT % 9     Results from last 7 days   Lab Units 07/27/19  0047 07/26/19  2038 07/26/19  1259   SODIUM mmol/L 120* 122* 123*   POTASSIUM mmol/L 4 7 4 6 4 1   CHLORIDE mmol/L 85* 85* 83*   CO2 mmol/L 34* 34* 37*   BUN mg/dL 15 15 17   CREATININE mg/dL 0 35* 0 38* 0 54*   CALCIUM mg/dL 8 0* 8 1* 8 8   ALK PHOS U/L  --   --  81   ALT U/L  --   --  48   AST U/L  --   --  35     Results from last 7 days   Lab Units 07/26/19  1259   MAGNESIUM mg/dL 2 2     No results found for: PHOS   Results from last 7 days   Lab Units 07/26/19  1259   INR  1 02   PTT seconds 29     0   Lab Value Date/Time    TROPONINI 0 07 (H) 07/26/2019 1259    TROPONINI 0 04 05/04/2018 0642    TROPONINI 0 03 05/04/2018 0337    TROPONINI 0 05 (H) 05/04/2018 0025    TROPONINI 0 04 04/30/2018 1535     Results from last 7 days   Lab Units 07/26/19  1259   LACTIC ACID mmol/L 1 3     ABG:  Lab Results   Component Value Date    PHART 7 372 07/26/2019    NJQ5BKU 65 1 (HH) 07/26/2019    PO2ART 135 2 (H) 07/26/2019    ISJ7LJM 37 0 (H) 07/26/2019    BEART 9 0 07/26/2019    SOURCE Radial, Right 07/26/2019       Imaging: CXR: CXR pending I have personally reviewed pertinent reports  ECHO: Pending    EKG: NSR    Micro:  Lab Results   Component Value Date    BLOODCX No Growth After 5 Days  04/30/2018    BLOODCX No Growth After 5 Days  04/30/2018    SPUTUMCULTUR Culture too young- will reincubate 04/30/2018       Allergies: Allergies   Allergen Reactions    Sulfa Antibiotics      Medications:   Scheduled Meds:  Current Facility-Administered Medications:  cefepime 2,000 mg Intravenous Q12H ARNIE Marin Last Rate: 2,000 mg (07/27/19 0305)   citalopram 10 mg Oral Daily María Balderrama, TRENP    docusate sodium 100 mg Oral Daily María Balderrama, ARNIE    enoxaparin 40 mg Subcutaneous Daily ARNIE Champion    ipratropium-albuterol 3 mL Nebulization Q6H ARNIE Champion    levothyroxine 75 mcg Oral Daily ARNIE Couch    pantoprazole 40 mg Oral Daily ARNIE Champion      Continuous Infusions:   PRN Meds:       Invasive lines and devices:   Invasive Devices     Peripheral Intravenous Line            Peripheral IV 07/26/19 Left Antecubital 1 day    Peripheral IV 07/26/19 Left Wrist less than 1 day          Drain            Urethral Catheter Latex 16 Fr  less than 1 day                   SIGNATURE: ARNIE Marin  DATE: July 27, 2019

## 2019-07-27 NOTE — OCCUPATIONAL THERAPY NOTE
OT EVALUATION       07/27/19 5355   Note Type   Note type Eval only   Restrictions/Precautions   Other Precautions Chair Alarm; Bed Alarm; Fall Risk;O2   Pain Assessment   Pain Assessment No/denies pain   Pain Score No Pain   Home Living   Type of Home Assisted living  [facility]   Home Layout One level;Elevator   Bathroom Shower/Tub Tub/shower unit   [address] [place] chair   Home Equipment Walker; Wheelchair-manual   Additional Comments Pt reports that she transfers and walks very short distances with rolling walker but uses w/c for longer distances  Pt is able to self-propel wheelchair to dining room for meals  Prior Function   Level of [place] Needs assistance with ADLs and functional mobility; Needs assistance with IADLs   Lives With Facility staff   Receives Help From Personal care attendant   ADL Assistance Needs assistance   IADLs Needs assistance   Vocational Retired   Comments Pt reports that she requires assistance from facility staff for showering but is able to dress herself  Pt requires assistance for IADLs     Subjective   Subjective "I would like to go back to [employer] "   ADL   Eating Assistance 5  Supervision/Setup   Grooming Assistance 4  Minimal Assistance   [address] 4  Minimal Assistance  (Seated on shower chair)   LB Bathing Assistance 3  Moderate Assistance  (Seated on shower chair)   [address] 3  Moderate Assistance   [address]  4  Minimal Assistance   Bed Mobility   Rolling R 4  Minimal assistance   Additional items Assist x 1;Verbal cues;LE management   Rolling L 4  Minimal assistance   Additional items Assist x 1;Verbal cues;LE management   Supine to Sit 4  Minimal assistance   Additional items Assist x 1;Verbal cues;LE management   Sit to Supine 4  Minimal assistance   Additional items Assist x 1;Verbal cues;LE management   Transfers   Additional Comments Unable to assess transfers at this time  Prior to sitting EOB pt's systolic BP in the 41'L  When sitting upright BP dropped to 74/47 and nurse notified  Pt reported feeling dizzy which did not resolve  Pt returned to supine with increase in BP  Pt's sp O2 remained at 95% throughout evaluation while seated EOB  Balance   Static Sitting Fair +   Dynamic Sitting Fair   Activity Tolerance   Activity Tolerance Patient tolerated treatment well;Treatment limited secondary to medical complications (Comment)  (Low BP when seated EOB)   Nurse [address] notified of pt's BP and present in room at end of session  RUE Overall AROM   R Mass Grasp Full flexion all fingers, 5/5  RUE Strength   R Shoulder Flexion 3+/5   R Shoulder Extension 3+/5   R Elbow Flexion 4/5   R Elbow Extension 4/5   LUE Overall AROM   L Mass Grasp Full flexion all fingers, 5/5  LUE Strength   L Shoulder Flexion 3+/5   L Shoulder Extension 3+/5   L Elbow Flexion 4/5   L Elbow Extension 4/5   Hand Function   Gross Motor Coordination Functional   Fine Motor Coordination Functional   Cognition   Overall Cognitive Status WFL   Arousal/Participation Alert; Responsive; Cooperative   Attention Within functional limits   Orientation Level Oriented X4  (Increased time to recall )   Memory Within functional limits   Following Commands Follows all commands and directions without difficulty   Assessment   Limitation Decreased ADL status; Decreased UE strength;Decreased Safe judgement during ADL;Decreased endurance;Decreased self-care trans;Decreased high-level ADLs   Prognosis Good   Assessment Patient evaluated by Occupational Therapy  Patient admitted with Acute respiratory failure with hypoxia ([facility] [address] )  The patients occupational profile, medical and therapy history includes a extensive additional review of physical, cognitive, or psychosocial history related to current functional performance    Comorbidities affecting functional mobility and ADLS include: scoliosis, hypothyroidism, murmur, and history of osteoarthritis  Prior to admission, patient was requiring assist for ADLS and requiring assist for IADLS  The evaluation identifies the following performance deficits: weakness, impaired balance, decreased endurance, increased fall risk, new onset of impairment of functional mobility, decreased ADLS, decreased IADLS, decreased activity tolerance, decreased safety awareness and SOB upon exertion, that result in activity limitations and/or participation restrictions  This evaluation requires clinical decision making of high complexity, because the patient presents with comorbidites that affect occupational performance and required significant modification of tasks or assistance with consideration of multiple treatment options  The Barthel Index was used as a functional outcome tool presenting with a score of 30, indicating marked limitations of functional mobility and ADLS  Patient will benefit from skilled Occupational Therapy services to address above deficits and facilitate a safe return to prior level of function  Goals   Patient Goals To return to [facility]   STG Time Frame   (1-7 days)   Short Term Goal  Goals established to promote patient goal of returning to [facility]:  Patient will increase standing tolerance to 2 minutes during ADL task to decrease assistance level and decrease fall risk; Patient will increase bed mobility to supervision in preparation for ADLS and transfers;  Patient will increase functional mobility to and from bedside commode with rolling walker with min assist to increase performance with ADLS and to use a toilet; Patient will tolerate 10 minutes of UE ROM/strengthening to increase general activity tolerance and performance in ADLS/IADLS; Patient will improve functional activity tolerance to 15 minutes of sustained functional tasks to increase participation in basic self-care and decrease assistance level;  Patient will be able to to verbalize understanding and perform energy conservation and proper body mechanics during ADLS and functional mobility at least 90% of the time with minimal cueing to decrease signs of fatigue and increase stamina to return to prior level of function; Patient will increase static sitting balance to good and dynamic sitting balance to fair+ to improve the ability to sit at edge of bed or on a chair for ADLS; Further assess static and dynamic standing balance  LTG Time Frame   (8-14 days)   Long Term Goal Goals established to promote patient goal of returning to [facility]:  Patient will increase standing tolerance to 5 minutes during ADL task to decrease assistance level and decrease fall risk; Patient will increase bed mobility to independent in preparation for ADLS and transfers; Patient will increase functional mobility to and from bedside commode with rolling walker with supervision to increase performance with ADLS and to use a toilet; Patient will tolerate 15 minutes of UE ROM/strengthening to increase general activity tolerance and performance in ADLS/IADLS; Patient will improve functional activity tolerance to 20 minutes of sustained functional tasks to increase participation in basic self-care and decrease assistance level;  Patient will be able to to verbalize understanding and perform energy conservation and proper body mechanics during ADLS and functional mobility at least 90% of the time with no cueing to decrease signs of fatigue and increase stamina to return to prior level of function; Patient will increase static sitting balance to normal and dynamic sitting balance to good to improve the ability to sit at edge of bed or on a chair for ADLS     Functional Transfer Goals   Pt Will Perform All Functional Transfers   (STG min assist, LTG supervision with RW)   ADL Goals   Pt Will Perform Eating   (STG independent)   Pt Will Perform Grooming   (STG supervision, LTG independent seated at sink in w/c)   Pt Will Perform Bathing   (STG min assist, LTG supervision seated on shower chair)   Pt Will Perform UE Dressing   (STG supervision, LTG independent)   Pt Will Perform LE Dressing   (STG min assist, LTG supervision)   Pt Will Perform Toileting   (STG supervision, LTG independent)   Plan   Treatment Interventions ADL retraining;Functional transfer training;UE strengthening/ROM; Endurance training;Patient/family training;Equipment evaluation/education; Compensatory technique education; Energy conservation; Activityengagement   Goal Expiration Date 08/10/19   OT Frequency 3-5x/wk   Recommendation   OT Discharge Recommendation Short Term Rehab   Barthel Index   Feeding 5   Bathing 0   Grooming Score 0   Dressing Score 5   Bladder Score 0   Bowels Score 10   Toilet Use Score 5   Transfers (Bed/Chair) Score 5   Mobility (Level Surface) Score 0   Stairs Score 0   Barthel Index Score 30   Licensure   [place] License Number  Reymundo Martinez [address], OTR/L [id number]

## 2019-07-27 NOTE — UTILIZATION REVIEW
MEDICARE INPATIENT ADMISSION 7/26/19 AT 1604 TO CRITICAL CARE LEVEL 1 STEPDOWN - NO REVIEW REQUIRED    Start   Ordered   07/26/19 1605  Inpatient Admission (expected length of stay for this patient Order details is greater than two midnights) (ED Bridging Orders Panel) Once     Transfer Service: Critical Care/ICU       Question Answer Comment   Admitting Physician TANYA REY    Level of Care Level 1 Stepdown    Estimated length of stay More than 2 Midnights    Certification I certify that inpatient services are medically necessary for this patient for a duration of greater than two midnights  See H&P and MD Progress Notes for additional information about the patient's course of treatment          07/26/19 1601

## 2019-07-27 NOTE — PLAN OF CARE
Problem: RESPIRATORY - ADULT  Goal: Achieves optimal ventilation and oxygenation  Description  INTERVENTIONS:  - Assess for changes in respiratory status  - Assess for changes in mentation and behavior  - Position to facilitate oxygenation and minimize respiratory effort  - Oxygen administration by appropriate delivery method based on oxygen saturation (per order) or ABGs  - Initiate smoking cessation education as indicated  - Encourage broncho-pulmonary hygiene including cough, deep breathe, Incentive Spirometry  - Assess the need for suctioning and aspirate as needed  - Assess and instruct to report SOB or any respiratory difficulty  - Respiratory Therapy support as indicated  Outcome: Progressing     Problem: PAIN - ADULT  Goal: Verbalizes/displays adequate comfort level or baseline comfort level  Description  Interventions:  - Encourage patient to monitor pain and request assistance  - Assess pain using appropriate pain scale  - Administer analgesics based on type and severity of pain and evaluate response  - Implement non-pharmacological measures as appropriate and evaluate response  - Consider cultural and social influences on pain and pain management  - Notify physician/advanced practitioner if interventions unsuccessful or patient reports new pain  Outcome: Progressing     Problem: INFECTION - ADULT  Goal: Absence or prevention of progression during hospitalization  Description  INTERVENTIONS:  - Assess and monitor for signs and symptoms of infection  - Monitor lab/diagnostic results  - Monitor all insertion sites, i e  indwelling lines, tubes, and drains  - Monitor endotracheal (as able) and nasal secretions for changes in amount and color  - Alicia appropriate cooling/warming therapies per order  - Administer medications as ordered  - Instruct and encourage patient and family to use good hand hygiene technique  - Identify and instruct in appropriate isolation precautions for identified infection/condition  Outcome: Progressing  Goal: Absence of fever/infection during neutropenic period  Description  INTERVENTIONS:  - Monitor WBC  - Implement neutropenic guidelines  Outcome: Progressing     Problem: SAFETY ADULT  Goal: Patient will remain free of falls  Description  INTERVENTIONS:  - Assess patient frequently for physical needs  -  Identify cognitive and physical deficits and behaviors that affect risk of falls    -  [place] fall precautions as indicated by assessment   - Educate patient/family on patient safety including physical limitations  - Instruct patient to call for assistance with activity based on assessment  - Modify environment to reduce risk of injury  - Consider OT/PT consult to assist with strengthening/mobility  Outcome: Progressing  Goal: Maintain or return to baseline ADL function  Description  INTERVENTIONS:  -  Assess patient's ability to carry out ADLs; assess patient's baseline for ADL function and identify physical deficits which impact ability to perform ADLs (bathing, care of mouth/teeth, toileting, grooming, dressing, etc )  - Assess/evaluate cause of self-care deficits   - Assess range of motion  - Assess patient's mobility; develop plan if impaired  - Assess patient's need for assistive devices and provide as appropriate  - Encourage maximum independence but intervene and supervise when necessary  ¯ Involve family in performance of ADLs  ¯ Assess for home care needs following discharge   ¯ Request OT consult to assist with ADL evaluation and planning for discharge  ¯ Provide patient education as appropriate  Outcome: Progressing  Goal: Maintain or return mobility status to optimal level  Description  INTERVENTIONS:  - Assess patient's baseline mobility status (ambulation, transfers, stairs, etc )    - Identify cognitive and physical deficits and behaviors that affect mobility  - Identify mobility aids required to assist with transfers and/or ambulation (gait belt, sit-to-stand, lift, walker, cane, etc )  - [place] fall precautions as indicated by assessment  - Record patient progress and toleration of activity level on Mobility SBAR; progress patient to next Phase/Stage  - Instruct patient to call for assistance with activity based on assessment  - Request Rehabilitation consult to assist with strengthening/weightbearing, etc   Outcome: Progressing     Problem: DISCHARGE PLANNING  Goal: Discharge to home or other facility with appropriate resources  Description  INTERVENTIONS:  - Identify barriers to discharge w/patient and caregiver  - Arrange for needed discharge resources and transportation as appropriate  - Identify discharge learning needs (meds, wound care, etc )  - Arrange for interpretive services to assist at discharge as needed  - Refer to Case Management Department for coordinating discharge planning if the patient needs post-hospital services based on physician/advanced practitioner order or complex needs related to functional status, cognitive ability, or social support system  Outcome: Progressing     Problem: Knowledge Deficit  Goal: Patient/family/caregiver demonstrates understanding of disease process, treatment plan, medications, and discharge instructions  Description  Complete learning assessment and assess knowledge base    Interventions:  - Provide teaching at level of understanding  - Provide teaching via preferred learning methods  Outcome: Progressing     Problem: METABOLIC, FLUID AND ELECTROLYTES - ADULT  Goal: Electrolytes maintained within normal limits  Description  INTERVENTIONS:  - Monitor labs and assess patient for signs and symptoms of electrolyte imbalances  - Administer electrolyte replacement as ordered  - Monitor response to electrolyte replacements, including repeat lab results as appropriate  - Instruct patient on fluid and nutrition as appropriate  Outcome: Progressing  Goal: Fluid balance maintained  Description  INTERVENTIONS:  - Monitor labs and assess for signs and symptoms of volume excess or deficit  - Monitor I/O and WT  - Instruct patient on fluid and nutrition as appropriate  Outcome: Progressing     Problem: Potential for Falls  Goal: Patient will remain free of falls  Description  INTERVENTIONS:  - Assess patient frequently for physical needs  -  Identify cognitive and physical deficits and behaviors that affect risk of falls    -  [place] fall precautions as indicated by assessment   - Educate patient/family on patient safety including physical limitations  - Instruct patient to call for assistance with activity based on assessment  - Modify environment to reduce risk of injury  - Consider OT/PT consult to assist with strengthening/mobility  Outcome: Progressing     Problem: Prexisting or High Potential for Compromised Skin Integrity  Goal: Skin integrity is maintained or improved  Description  INTERVENTIONS:  - Identify patients at risk for skin breakdown  - Assess and monitor skin integrity  - Assess and monitor nutrition and hydration status  - Monitor labs (i e  albumin)  - Assess for incontinence   - Turn and reposition patient  - Assist with mobility/ambulation  - Relieve pressure over bony prominences  - Avoid friction and shearing  - Provide appropriate hygiene as needed including keeping skin clean and dry  - Evaluate need for skin moisturizer/barrier cream  - Collaborate with interdisciplinary team (i e  Nutrition, Rehabilitation, etc )   - Patient/family teaching  Outcome: Progressing

## 2019-07-28 PROBLEM — J96.21 ACUTE ON CHRONIC RESPIRATORY FAILURE WITH HYPOXIA (HCC): Status: ACTIVE | Noted: 2018-05-01

## 2019-07-28 NOTE — PLAN OF CARE
Problem: SLP ADULT - SWALLOWING, IMPAIRED  Goal: Initial SLP swallow eval performed  Outcome: Completed     Problem: SLP ADULT - SWALLOWING, IMPAIRED  Goal: Advance to least restrictive diet without signs or symptoms of aspiration for planned discharge setting  See evaluation for individualized goals    Outcome: Progressing

## 2019-07-28 NOTE — CONSULTS
Consultation - Pulmonary Medicine   Mely Staton 80 y o  female MRN: [id number]  Unit/Bed#: ICU 05 Encounter: [id number]      Assessment:  Acute on chronic hypercapnic hypoxemic respiratory failure improving  Chronic hypercapnia and likely has element of chronic hypoxemia due to severe restrictive lung disease from marked thoracic scoliosis  Bilateral small pleural effusions and pulmonary vascular congestion secondary to decompensated cor pulmonale  Severe thoracic scoliosis  Benign familial tremor  No definite evidence of pneumonia or UTI  Hyponatremia which is improved  This suspect this is due to of fluid overload from decompensated cor pulmonale  Plan:   Patient's daughter is at the bedside and I discussed diagnosis and treatment with patient and the daughter  I also readdressed code status  Patient states she would not want mechanical ventilatory support or CPR  She is agreeable to medical therapy but does not want intubation or ventilator support  She did not care for BiPAP ventilatory support either  I agree with IV furosemide  Patient was given furosemide 40 mg IV today and may need additional dosing over the subsequent days  Supplemental oxygen as ordered  May have some bronchospasm due to this vascular congestion and concur with levalbuterol 0 63 mg every 8 hours  Pleural effusions are small and this time would not consider thoracentesis  History of Present Illness   Physician Requesting Consult: Trinh Hughes MD  Reason for Consult / Principal Problem:  Hypercapnic hypoxemic respiratory failure    Hx and PE limited by: none      HPI: Mely Staton is a 80y o  year old female who presents from assisted living facility in [place], [place] complains of not feeling right and having worsening shortness of breath over the last several days  EMS reported patient initial oxygen saturation to be in the 70% range    She was given nebulizer treatment and Solu-Medrol EN route and O2 saturation did improving to 90% range  Initial ABG done on 6 L of oxygen O2 pH is 7 27, pCO2 of 77 and PO2 of 140 mm Hg  Initial chest x-ray showed small lung volumes and mild pulmonary vascular congestion and what appears to be small pleural effusions  Also patient with marked thoracic scoliosis with curvature to the right  In ER she was noted to have some wheezing and was given dose of Solu-Medrol neb treatments with DuoNeb  She was also given Lasix IV 20 mg  Because of concern of possible underlying pneumonia as well she was started on IV cefepime  She was admitted to the ICU-step-down unit for closer monitoring  One point she was placed on BiPAP therapy but did not like and was then switched to high-flow oxygen  Presently she is on high-flow nasal  oxygen at 40% with 50 L flow rate  O2 saturation is 96%  She states her breathing is improved  Repeat chest x-ray today again shows small lung volumes with some increase in bilateral pleural effusions which are still small but present  Initial blood work showed serum sodium decreased at 1:20 a m  And has improved up to 130 today  Serum creatinine is 0 54 and serum bicarb is 38  Serum osmolality is 280  Her initial white blood cell count was 10 7 no shift and repeat is 9 5 with hemoglobin 13 3  Serum BNP was 2,462  Serum procalcitonin level has not been elevated x2  Urine culture was unremarkable  Patient states normally she has sleep in a sitting up type position because of 1st back pain from her scoliosis  She denies any history of Parkinson's disease  She has history of benign familiar tremor  Also has history of hypothyroidism  History of heart disease  She has been treated for asthmatic bronchitis before  She is not on any inhaler therapy at home and she is not on any oxygen at home    Last ABG done on 07/26 after being on BiPAP ventilatory support showed pH is 7 38, pCO2 63 and PO2 135 mm Hg      Echocardiogram done on 07/27 shows normal left ventricular systolic function with ejection fraction of 55%  There is evidence of severe pulmonary artery hypertension with estimated PA pressure of 72 mm Hg  The right ventricle was dilated  No evidence of any significant valvular heart disease  Urine culture is negative  Review of Systems   Constitutional: Negative for activity change, appetite change, fatigue and fever  HENT: Negative for congestion and rhinorrhea  Eyes: Negative for redness  Respiratory: Positive for shortness of breath  Negative for chest tightness  Cardiovascular: Negative for chest pain and leg swelling  Gastrointestinal: Negative for abdominal distention and abdominal pain  Genitourinary: Negative for hematuria  Musculoskeletal: Negative for myalgias  Has chronic back pain due to thoracic scoliosis   Skin: Negative for rash  Neurological: Negative for dizziness and light-headedness  Psychiatric/Behavioral: Negative for confusion  Historical Information   Past Medical History:   Diagnosis Date    Arthritis     Benign familial tremor     Cellulitis     Cellulitis     Chest pain     Constipation     Cough     Depression     Disease of thyroid gland     Edema     lower ext   GERD (gastroesophageal reflux disease)     Hypothyroidism     [facility])     [facility] ([facility] [address] [facility]     Scoliosis     Thoracic scoliosis    Patient was diagnosed with scoliosis around age 13    Scoliosis     SOB (shortness of breath)     Tremor      Past Surgical History:   Procedure Laterality Date    HYSTERECTOMY       Social History   Social History     Substance and Sexual Activity   Alcohol Use Yes    Alcohol/week: 0 0 standard drinks    Comment: social     Social History     Substance and Sexual Activity   Drug Use No     Social History     Tobacco Use   Smoking Status Former Smoker    Packs/day: 0 25    Years: 10 00    Pack years: 2 50    Types: Cigarettes    Start date: 80    Last attempt to quit: Carissa Collins Years since quittin 8   Smokeless Tobacco Never Used         Family History:   Family History   Problem Relation Age of Onset    Heart disease Father     Heart disease Brother        Meds/Allergies     Current Facility-Administered Medications:     acetaminophen (TYLENOL) tablet 650 mg, 650 mg, Oral, Q6H PRN, Sheri Galvez PA-C    budesonide (PULMICORT) inhalation solution 0 5 mg, 0 5 mg, Nebulization, Q12H, Sheri Galvez PA-C    cefepime (MAXIPIME) 2,000 mg in dextrose 5 % 50 mL IVPB, 2,000 mg, Intravenous, Q12H, Sheri Galvez PA-C, Last Rate: 100 mL/hr at 19 1422, 2,000 mg at 19 1422    citalopram (CeleXA) tablet 10 mg, 10 mg, Oral, Daily, Sheri Galvez PA-C, 10 mg at 19 0933    docusate sodium (COLACE) capsule 100 mg, 100 mg, Oral, Daily, Sheri Galvez PA-C, 100 mg at 19 0933    enoxaparin (LOVENOX) subcutaneous injection 40 mg, 40 mg, Subcutaneous, Daily, Sheri Galvez PA-C, 40 mg at 19 0933    ipratropium-albuterol (DUO-NEB) 0 5-2 5 mg/3 mL inhalation solution 3 mL, 3 mL, Nebulization, Q6H PRN, Sheri Galvez PA-C    levalbuterol (XOPENEX) inhalation solution 0 63 mg, 0 63 mg, Nebulization, Q8H, Sheri Galvez PA-C, 0 63 mg at 19 1507    levothyroxine tablet 75 mcg, 75 mcg, Oral, Daily, Sheri Galvez PA-C, 75 mcg at 19 0509    pantoprazole (PROTONIX) EC tablet 40 mg, 40 mg, Oral, Daily, Sheri Galvez PA-C, 40 mg at 19 3843    Prior to Admission medications    Medication Sig Start Date End Date Taking?  Authorizing Provider   Acetaminophen (ARTHRITIS PAIN RELIEVER PO) Take 650 mg by mouth every 6 (six) hours as needed    Historical Provider, MD   albuterol (ACCUNEB) 1 25 MG/3ML nebulizer solution Take 1 ampule by nebulization every 6 (six) hours as needed for wheezing    Historical Provider, MD   Ascorbic Acid (VITAMIN C) 100 MG tablet Take 100 mg by mouth daily    Historical Provider, MD   benzonatate (TESSALON PERLES) 100 mg capsule Take 100 mg by mouth 3 (three) times a day as needed for cough    Historical Provider, MD   Calcium Carb-Cholecalciferol (CALCIUM+D3) 600-800 MG-UNIT TABS Take by mouth 2 (two) times a day    Historical Provider, MD   citalopram (CeleXA) 10 mg tablet Take 10 mg by mouth daily    Historical Provider, MD   docusate sodium (COLACE) 100 mg capsule Take 100 mg by mouth daily    Historical Provider, MD   fluticasone (FLONASE) 50 mcg/act nasal spray 1 spray into each nostril daily    Historical Provider, MD   furosemide (LASIX) 40 mg tablet Take 40 mg by mouth 2 (two) times a day    Historical Provider, MD   ipratropium (ATROVENT) 0 02 % nebulizer solution Take 0 5 mg by nebulization every 6 (six) hours as needed for wheezing or shortness of breath    Historical Provider, MD   levothyroxine 75 mcg tablet Take 75 mcg by mouth daily    Historical Provider, MD   Liniments (SALONPAS PAIN RELIEF PATCH EX) Apply topically daily as needed    Historical Provider, MD   nitroglycerin (NITROSTAT) 0 4 mg SL tablet Place 0 4 mg under the tongue every 5 (five) minutes as needed for chest pain    Historical Provider, MD   pantoprazole (PROTONIX) 40 mg tablet Take 40 mg by mouth daily    Historical Provider, MD   pramipexole (MIRAPEX) 0 125 mg tablet Take 0 125 mg by mouth 3 (three) times a day    Historical Provider, MD   primidone (MYSOLINE) 50 mg tablet Take by mouth 2 (two) times a day    Historical Provider, MD   Probiotic Product ([address]) CAPS Take by mouth    Historical Provider, MD   traMADol (ULTRAM) 50 mg tablet Take 50 mg by mouth 3 (three) times a day    Historical Provider, MD       Allergies   Allergen Reactions    Sulfa Antibiotics        Objective   Vitals: Blood pressure 107/76, pulse 100, temperature 98 1 °F (36 7 °C), temperature source Temporal, resp   rate (!) 30, height 4' 10" (1 473 m), weight 68 9 kg (151 lb 14 4 oz), SpO2 96 %  ,Body mass index is 31 75 kg/m²  Intake/Output Summary (Last 24 hours) at 7/28/2019 1626  Last data filed at 7/28/2019 1324  Gross per 24 hour   Intake 1330 ml   Output 1261 ml   Net 69 ml     Invasive Devices     Peripheral Intravenous Line            Peripheral IV 07/26/19 Left Antecubital 2 days    Peripheral IV 07/26/19 Left Wrist 2 days                Physical Exam   Constitutional: She appears well-developed and well-nourished  No distress  Patient is awake, alert, and cooperative  On 45% high-flow oxygen her O2 saturation is 96%  No accessory muscle use  HENT:   Head: Normocephalic  Nose: Nose normal    Mouth/Throat: Oropharynx is clear and moist  No oropharyngeal exudate  Eyes: Pupils are equal, round, and reactive to light  Conjunctivae are normal    Neck: Neck supple  No tracheal deviation present  Cardiovascular: Normal rate, regular rhythm and normal heart sounds  Pulmonary/Chest: Effort normal    Lung sounds are diminished bilaterally  Faint expiratory wheeze right lower lobe  Few inspiratory crackles at the bases    Patient does have prominent thoracic scoliosis with apex of curvature to the right   Abdominal: Soft  She exhibits no distension  There is no tenderness  There is no guarding  Musculoskeletal: She exhibits no edema  Lymphadenopathy:     She has no cervical adenopathy  Neurological: She is alert  There is some trace edema of lower tibial surfaces   Skin: Skin is warm and dry  No rash noted  Psychiatric: She has a normal mood and affect   Her behavior is normal  Thought content normal        Lab Results: ABG:   Lab Results   Component Value Date    PHART 7 372 07/26/2019    SDN4RPA 65 1 (HH) 07/26/2019    PO2ART 135 2 (H) 07/26/2019    MNB7SAW 37 0 (H) 07/26/2019    BEART 9 0 07/26/2019    SOURCE Radial, Right 07/26/2019   , BNP: No results found for: BNP, CBC:   Lab Results   Component Value Date    WBC 9 50 07/27/2019    HGB 13 3 07/27/2019 HCT 42 3 07/27/2019    MCV 99 (H) 07/27/2019     07/27/2019    MCH 31 0 07/27/2019    MCHC 31 4 07/27/2019    RDW 13 6 07/27/2019    MPV 9 0 07/27/2019    NRBC 0 07/27/2019   , CMP:   Lab Results   Component Value Date    K 4 7 07/28/2019    CL 89 (L) 07/28/2019    CO2 38 (H) 07/28/2019    BUN 22 07/28/2019    CREATININE 0 54 (L) 07/28/2019    CALCIUM 8 3 07/28/2019    AST 29 07/27/2019    ALT 42 07/27/2019    ALKPHOS 63 07/27/2019    EGFR 82 07/28/2019   , PT/INR:   Lab Results   Component Value Date    INR 1 02 07/26/2019   , Troponin: No results found for: TROPONIN    Imaging Studies: I have personally reviewed pertinent reports  and I have personally reviewed pertinent films in PACS    EKG, Pathology, and Other Studies: I have personally reviewed pertinent reports  VTE Prophylaxis: Sequential compression device (Venodyne) , Lovenox 40 mg subcutaneous daily    Code Status: Level 2 - DNAR: but accepts endotracheal intubation    Counseling/Coordination of Care: Total floor / unit time spent today 35 minutes  Greater than 50% of total time was spent with the patient and / or family counseling and / or coordination of care  A description of the counseling / coordination of care: I reviewed patient's chart  I spoke with patient's daughter and reviewed chest x-ray images with her discussed diagnosis with her    I also reviewed case with critical care team

## 2019-07-28 NOTE — PLAN OF CARE
Problem: RESPIRATORY - ADULT  Goal: Achieves optimal ventilation and oxygenation  Description  INTERVENTIONS:  - Assess for changes in respiratory status  - Assess for changes in mentation and behavior  - Position to facilitate oxygenation and minimize respiratory effort  - Oxygen administration by appropriate delivery method based on oxygen saturation (per order) or ABGs  - Initiate smoking cessation education as indicated  - Encourage broncho-pulmonary hygiene including cough, deep breathe, Incentive Spirometry  - Assess the need for suctioning and aspirate as needed  - Assess and instruct to report SOB or any respiratory difficulty  - Respiratory Therapy support as indicated  Outcome: Progressing     Problem: PAIN - ADULT  Goal: Verbalizes/displays adequate comfort level or baseline comfort level  Description  Interventions:  - Encourage patient to monitor pain and request assistance  - Assess pain using appropriate pain scale  - Administer analgesics based on type and severity of pain and evaluate response  - Implement non-pharmacological measures as appropriate and evaluate response  - Consider cultural and social influences on pain and pain management  - Notify physician/advanced practitioner if interventions unsuccessful or patient reports new pain  Outcome: Progressing     Problem: INFECTION - ADULT  Goal: Absence or prevention of progression during hospitalization  Description  INTERVENTIONS:  - Assess and monitor for signs and symptoms of infection  - Monitor lab/diagnostic results  - Monitor all insertion sites, i e  indwelling lines, tubes, and drains  - Monitor endotracheal (as able) and nasal secretions for changes in amount and color  - [place] appropriate cooling/warming therapies per order  - Administer medications as ordered  - Instruct and encourage patient and family to use good hand hygiene technique  - Identify and instruct in appropriate isolation precautions for identified infection/condition  Outcome: Progressing  Goal: Absence of fever/infection during neutropenic period  Description  INTERVENTIONS:  - Monitor WBC  - Implement neutropenic guidelines  Outcome: Progressing     Problem: SAFETY ADULT  Goal: Patient will remain free of falls  Description  INTERVENTIONS:  - Assess patient frequently for physical needs  -  Identify cognitive and physical deficits and behaviors that affect risk of falls    -  [place] fall precautions as indicated by assessment   - Educate patient/family on patient safety including physical limitations  - Instruct patient to call for assistance with activity based on assessment  - Modify environment to reduce risk of injury  - Consider OT/PT consult to assist with strengthening/mobility  Outcome: Progressing  Goal: Maintain or return to baseline ADL function  Description  INTERVENTIONS:  -  Assess patient's ability to carry out ADLs; assess patient's baseline for ADL function and identify physical deficits which impact ability to perform ADLs (bathing, care of mouth/teeth, toileting, grooming, dressing, etc )  - Assess/evaluate cause of self-care deficits   - Assess range of motion  - Assess patient's mobility; develop plan if impaired  - Assess patient's need for assistive devices and provide as appropriate  - Encourage maximum independence but intervene and supervise when necessary  ¯ Involve family in performance of ADLs  ¯ Assess for home care needs following discharge   ¯ Request OT consult to assist with ADL evaluation and planning for discharge  ¯ Provide patient education as appropriate  Outcome: Progressing  Goal: Maintain or return mobility status to optimal level  Description  INTERVENTIONS:  - Assess patient's baseline mobility status (ambulation, transfers, stairs, etc )    - Identify cognitive and physical deficits and behaviors that affect mobility  - Identify mobility aids required to assist with transfers and/or ambulation (gait belt, sit-to-stand, lift, walker, cane, etc )  - [place] fall precautions as indicated by assessment  - Record patient progress and toleration of activity level on Mobility SBAR; progress patient to next Phase/Stage  - Instruct patient to call for assistance with activity based on assessment  - Request Rehabilitation consult to assist with strengthening/weightbearing, etc   Outcome: Progressing     Problem: DISCHARGE PLANNING  Goal: Discharge to home or other facility with appropriate resources  Description  INTERVENTIONS:  - Identify barriers to discharge w/patient and caregiver  - Arrange for needed discharge resources and transportation as appropriate  - Identify discharge learning needs (meds, wound care, etc )  - Arrange for interpretive services to assist at discharge as needed  - Refer to Case Management Department for coordinating discharge planning if the patient needs post-hospital services based on physician/advanced practitioner order or complex needs related to functional status, cognitive ability, or social support system  Outcome: Progressing     Problem: Knowledge Deficit  Goal: Patient/family/caregiver demonstrates understanding of disease process, treatment plan, medications, and discharge instructions  Description  Complete learning assessment and assess knowledge base    Interventions:  - Provide teaching at level of understanding  - Provide teaching via preferred learning methods  Outcome: Progressing     Problem: METABOLIC, FLUID AND ELECTROLYTES - ADULT  Goal: Electrolytes maintained within normal limits  Description  INTERVENTIONS:  - Monitor labs and assess patient for signs and symptoms of electrolyte imbalances  - Administer electrolyte replacement as ordered  - Monitor response to electrolyte replacements, including repeat lab results as appropriate  - Instruct patient on fluid and nutrition as appropriate  Outcome: Progressing  Goal: Fluid balance maintained  Description  INTERVENTIONS:  - Monitor labs and assess for signs and symptoms of volume excess or deficit  - Monitor I/O and WT  - Instruct patient on fluid and nutrition as appropriate  Outcome: Progressing     Problem: Potential for Falls  Goal: Patient will remain free of falls  Description  INTERVENTIONS:  - Assess patient frequently for physical needs  -  Identify cognitive and physical deficits and behaviors that affect risk of falls  -  [place] fall precautions as indicated by assessment   - Educate patient/family on patient safety including physical limitations  - Instruct patient to call for assistance with activity based on assessment  - Modify environment to reduce risk of injury  - Consider OT/PT consult to assist with strengthening/mobility  Outcome: Progressing     Problem: Prexisting or High Potential for Compromised Skin Integrity  Goal: Skin integrity is maintained or improved  Description  INTERVENTIONS:  - Identify patients at risk for skin breakdown  - Assess and monitor skin integrity  - Assess and monitor nutrition and hydration status  - Monitor labs (i e  albumin)  - Assess for incontinence   - Turn and reposition patient  - Assist with mobility/ambulation  - Relieve pressure over bony prominences  - Avoid friction and shearing  - Provide appropriate hygiene as needed including keeping skin clean and dry  - Evaluate need for skin moisturizer/barrier cream  - Collaborate with interdisciplinary team (i e  Nutrition, Rehabilitation, etc )   - Patient/family teaching  Outcome: Progressing     Problem: Nutrition/Hydration-ADULT  Goal: Nutrient/Hydration intake appropriate for improving, restoring or maintaining nutritional needs  Description  Monitor and assess patient's nutrition/hydration status for malnutrition (ex- brittle hair, bruises, dry skin, pale skin and conjunctiva, muscle wasting, smooth red tongue, and disorientation)  Collaborate with interdisciplinary team and initiate plan and interventions as ordered  Monitor patient's weight and dietary intake as ordered or per policy  Utilize nutrition screening tool and intervene per policy  Determine patient's food preferences and provide high-protein, high-caloric foods as appropriate       INTERVENTIONS:  - Monitor oral intake, urinary output, labs, and treatment plans  - Assess nutrition and hydration status and recommend course of action  - Evaluate amount of meals eaten  - Assist patient with eating if necessary   - Allow adequate time for meals  - Recommend/ encourage appropriate diets, oral nutritional supplements, and vitamin/mineral supplements  - Order, calculate, and assess calorie counts as needed  - Recommend, monitor, and adjust tube feedings and TPN/PPN based on assessed needs  - Assess need for intravenous fluids  - Provide specific nutrition/hydration education as appropriate  - Include patient/family/caregiver in decisions related to nutrition  Outcome: Progressing

## 2019-07-28 NOTE — SPEECH THERAPY NOTE
Speech-Language Pathology Bedside Swallow Evaluation        Patient Name: Tod Vivas    AJIMG'A Date: 7/28/2019     Problem List  Patient Active Problem List   Diagnosis    Asthmatic bronchitis    Scoliosis    Benign essential tremor    Restless leg    Venous insufficiency of both lower extremities    Hypothyroidism    Acute on chronic respiratory failure with hypoxia (HCC)    Allergic rhinitis    Decreased hearing of both ears    Murmur    Hyponatremia    Diastolic heart failure (HCC)    Atherosclerotic peripheral vascular disease ([facility] [address] )    Osteoarthritis    Urinary tract infection without hematuria       Past Medical History  Past Medical History:   Diagnosis Date    Arthritis     Cellulitis     Cellulitis     Chest pain     Constipation     Cough     Depression     Disease of thyroid gland     Edema     lower ext   GERD (gastroesophageal reflux disease)     Hypothyroidism     Parkinson disease (HCC)     Parkinson disease ([facility] [address] )     Scoliosis     Scoliosis     SOB (shortness of breath)     Tremor        Past Surgical History  Past Surgical History:   Procedure Laterality Date    HYSTERECTOMY           Current Medical Status  Pt is a 80 y o  female who [facility] to [facility] with increased SOB  She was reportedly saturating int he 70s upon arrival of EMS  This was increased to the 90s with nebulizer and solumedrol  She was subsequently placed on BiPaP and then weaned to HFNC  She was started on a regular diet with thin liquids and has reportedly been tolerating it  Patient is known to this department from a previous admission in May of 2018  Her swallow function was WNL at that time with suspected mild esophageal dysmotility given reported symptoms however no concerns for aspiration at that time  Imaging suggestive of PNA at this time      Past medical history:   Please see H&P for details      Special Studies:  7/29 CXR: Worsening airspace disease, right greater than left suggesting laminar edema and effusions  Infection not excluded  7/27 CXR: Bibasilar opacities representing atelectasis or pneumonia  7/26 CXR: Increased interstitial and vascular congestion with bibasilar densities and probable small effusions  Social/Education/Vocational Hx:  Pt lives in assisted living facility      Swallow Information   Current Risks for Dysphagia & Aspiration: change in respiratory status     Current Symptoms/Concerns: change in respiratory status    Current Diet: regular diet and thin liquids      Baseline Diet: regular diet and thin liquids      Baseline Assessment   Behavior/Cognition: alert    Speech/Language Status: able to participate in conversation and able to follow commands    Patient Positioning: upright in chair      Swallow Mechanism Exam   Facial: symmetrical  Labial: WFL  Lingual: WFL  Velum: symmetrical  Mandible: adequate ROM  Dentition: adequate  Vocal quality:clear/adequate   Volitional Cough: strong/productive   Respiratory: 44L HFNC  Swallow Mechanics:  WFl upon dry swallow     Consistencies Assessed and Performance   Consistencies Administered: thin liquids, puree, soft solids, hard solids and mixed consistency  Specific materials administered included: applesauce, peaches in juice, hard cookie and thin water via cup sips    Oral Stage: WFL  Mastication was adequate with the materials administered today  Bolus formation and transfer were functional with nosignificant oral residue noted  No overt s/s reduced oral control  Pharyngeal Stage: [facility]  Swallowing initiation appeared prompt  Laryngeal rise was palpated and judged to be within functional limits  No coughing, throat clearing, change in vocal quality or respiratory status noted today  Esophageal Concerns: none reported    Summary   Pts oropharyngeal swallow function appears generally WFL at this time with the materials administered today    She does not appear to be at risk for aspiration at this time however cannot r/o silent aspiration  If there is concern for silent aspiration a VBSS may be considered  Recommendations: regular diet and thin liquids     Recommended Form of Meds: no restriction     Aspiration precautions and compensatory swallowing strategies: upright posture, slow rate of feeding and small bites/sips    Results Reviewed with: patient and RN     Dysphagia Goals: pt will tolerate regular textures with thin liquids without s/s of aspiration x3      Plan  Will follow up 1-3x to ensure diet tolerance and determine if additional instrumental assessment is warranted           JESSICA Hathaway S , [id number] [facility]  Speech Language Pathologist   [id number]

## 2019-07-28 NOTE — PROGRESS NOTES
Daily Progress Note - Critical Care/ Sally Bell 80 y o  female MRN: [id number]  Unit/Bed#: ICU 05 Encounter: [id number]    ______________________________________________________________________  Assessment:   Principal Problem:    Acute on chronic respiratory failure with hypoxia (HCC)  Active Problems:    Diastolic heart failure (HCC)    Hyponatremia    Murmur    Urinary tract infection without hematuria    Scoliosis    Restless leg    Hypothyroidism  Resolved Problems:    * No resolved hospital problems  *      * Acute on chronic respiratory failure with hypoxia (HCC)  Assessment & Plan  · Patient tachypneic with RR in the 40s in the ER  Placed on BiPAP 14/6 and 35% for increased work of breathing   · Pt given lasix in ED for suspected vascular congestion  · CXR with bibasilar opacities - atelectasis vs infection  Procal remains negative - low suspicion for PNA  · Pulmonary hypertension and dilated cardiomyopathy on echo  PAP 70mmHg  EF 55%  Dilated vena cava  · Pt remains on Optiflow (does not tolerate BiPap)  50L/45%  Wean as tolerated  · Evaluate right lung for possible thoracentesis versus bronchoscopy with ultrasound  Pleural effusion versus consolidation  Continue aggressive chest PT and scheduled breathing treatments   · Patient does have home oxygen needs  2 L nasal cannula with exertion but no oxygen needs at rest   ABG suspicious for chronic hypercapnic respiratory failure with full compensation  Diastolic heart failure (HCC)  Assessment & Plan  Wt Readings from Last 3 Encounters:   07/28/19 68 9 kg (151 lb 14 4 oz)   05/23/18 62 1 kg (137 lb)   05/16/18 62 3 kg (137 lb 6 4 oz)     · Patient's weight up 2 kilos since admission  · Mildly dilated vena cava on echo  · Patient does appear intravascularly dry with orthostasis  · Will consider repeat diuresis    Hyponatremia  Assessment & Plan  · Sodium currently at 128  Patient without symptoms    · Evaluate urine and serum osmoles      Murmur  Assessment & Plan  · Echocardiogram demonstrates mild MR and significant pulmonary hypertension with PA P of 72    Urinary tract infection without hematuria  Assessment & Plan  · Analysis positive for leukocytes, WBC, and bacteria  · Await culture  · Patient currently on cefepime  Deescalate as able  Hypothyroidism  Assessment & Plan  · Continue home Synthroid dose    Scoliosis  Assessment & Plan  · Tylenol for pain  · Supportive care  · Pulmonary hygiene       Plan:    Neuro:     · Delirium: CAM ICU positive no   · If yes, intervention: regulate sleep wake cycle  environmental controls  · Pain controlled with: acetaminophen  · Pain score: moderate - back  · Regulate sleep/wake cycle    CV:   · Severe pulmonary hypertension with mild/moderate MR - diurese    Pulm:   · Acute on chronic hypoxic and hypercarbic respiratory failure -likely related to cardiac dysfunction-  diurese - no evidence of PNA  US today for evaluation of pleural effusion  Wean HFNC as tolerated  GI:   · Nutrition/diet plan: House diet with fluid restriction    FEN:     · Fluid/Diuretic plan: Needs diuresis lasix IV  · Electrolytes repleted: yes  · Goal: K >4 0, Mag >2 0, and Phos >3 0    :   · Indwelling Roberto present: no     ID:     · Discontinue abx  UC negative  Procal negative      Heme:     · Trend hgb and plts    Endo:     · Glycemic control plan: Blood glucose controlled on current regimen    Msk/Skin:  · Mobility goal: OOB with assistance  · PT consult: yes  · OT consult: yes  · Frequent turning and off-loading    Family:  · Family updated within 24 hours: yes   · Family meeting planned today: no     Lines:  · PIV    VTE Prophylaxis:  · Pharmacologic Prophylaxis: Enoxaparin (Lovenox)  · Mechanical Prophylaxis: sequential compression device    Disposition: Continue Stepdown    Code Status: Level 2 - DNAR: but accepts endotracheal intubation    Counseling / Coordination of Care  Total time spent today 42 minutes  Greater than 50% of total time was spent with the patient and / or family counseling and / or coordination of care  A description of the counseling / coordination of care: discussed with attending, d/w nursing, d/w family, d/w patient  ______________________________________________________________________    HPI/24hr events:  No acute overnight events  Patient tolerated high-flow nasal cannula comfortably  Says she feels much better    ______________________________________________________________________    Physical Exam:   Physical Exam   Constitutional: She is oriented to person, place, and time  She appears well-developed and well-nourished  HENT:   Head: Normocephalic and atraumatic  Eyes: Pupils are equal, round, and reactive to light  Conjunctivae are normal    Neck: Neck supple  JVD present  Cardiovascular: Normal rate and regular rhythm  Murmur heard  Pulmonary/Chest: Effort normal    Patient tachypneic with decreased breath sounds  Dullness on percussion of right lower lobe   Abdominal: Soft  Bowel sounds are normal  She exhibits no distension  Musculoskeletal: Normal range of motion  Mild lower extremity edema   Neurological: She is oriented to person, place, and time  No cranial nerve deficit  Skin: Skin is warm and dry  Capillary refill takes less than 2 seconds  No rash noted  She is not diaphoretic     Psychiatric: She has a normal mood and affect        ______________________________________________________________________  Vitals:    19 0535 19 0721 19 0722 19 07   BP:   122/58    BP Location:   Right arm    Pulse: 76  92    Resp: (!) 35  (!) 28    Temp:  (!) 97 4 °F (36 3 °C) 97 8 °F (36 6 °C)    TempSrc:  Temporal Temporal    SpO2: 95%  94% 95%   Weight:   68 9 kg (151 lb 14 4 oz)    Height:                  Temperature:   Temp (24hrs), Av 7 °F (36 5 °C), Min:97 2 °F (36 2 °C), Max:98 8 °F (37 1 °C)    Current Temperature: 97 8 °F (36 6 °C)    Weights:   IBW: 40 9 kg    Body mass index is 31 75 kg/m²  Weight (last 2 days)     Date/Time   Weight    07/28/19 0722   68 9 (151 9)    07/27/19 0538   66 5 (146 61)    07/26/19 1725   66 (145 5)              Hemodynamic Monitoring:  N/A       Non-Invasive/Invasive Ventilation Settings:  Respiratory    Lab Data (Last 4 hours)    None         O2/Vent Data (Last 4 hours)      07/28 0535 07/28 0727        Non-Invasive Ventilation Mode HFNC HFNC                No results found for: PHART, VFF6OEV, PO2ART, UZW7XCV, R7YNLOGT, BEART, SOURCE  SpO2: SpO2: 95 %, SpO2 Activity: SpO2 Activity: At Rest, SpO2 Device: O2 Device: Other (comment)    Intake and Outputs:  I/O       07/26 0701 - 07/27 0700 07/27 0701 - 07/28 0700 07/28 0701 - 07/29 0700    P  O   0     I V  (mL/kg) 520 (7 8) 1540 (23 2)     IV Piggyback 200 250     Total Intake(mL/kg) 720 (10 8) 1790 (26 9)     Urine (mL/kg/hr) 885 796 (0 5)     Total Output 885 796     Net -165 +994                    Nutrition:        Diet Orders   (From admission, onward)             Start     Ordered    07/28/19 0247  Diet Regular; Regular House  Diet effective now     Question Answer Comment   Diet Type Regular    Regular Regular House    RD to adjust diet per protocol?  Yes        07/28/19 0246                  Labs:   Results from last 7 days   Lab Units 07/27/19  0441 07/26/19  1259   WBC Thousand/uL 9 50 10 72*   HEMOGLOBIN g/dL 13 3 14 9   HEMATOCRIT % 42 3 46 4*   PLATELETS Thousands/uL 291 340   NEUTROS PCT % 75 77*   MONOS PCT % 15* 9     Results from last 7 days   Lab Units 07/28/19  0500 07/28/19  0004 07/27/19  1536  07/27/19  0441  07/26/19  1259   SODIUM mmol/L 128* 127* 127*   < > 124*   < > 123*   POTASSIUM mmol/L 4 5 4 4 4 9   < > 4 3   < > 4 1   CHLORIDE mmol/L 88* 88* 87*   < > 86*   < > 83*   CO2 mmol/L 39* 38* 41*   < > 38*   < > 37*   BUN mg/dL 21 18 19   < > 17   < > 17   CREATININE mg/dL 0 60 0 53* 0 62   < > 0 51*   < > 0 54*   CALCIUM mg/dL 8 5 8 3 8 3   < > 8 0*   < > 8 8   ALK PHOS U/L  --   --   --   --  63  --  81   ALT U/L  --   --   --   --  42  --  48   AST U/L  --   --   --   --  29  --  35    < > = values in this interval not displayed  Results from last 7 days   Lab Units 07/28/19  0500 07/27/19  0441 07/26/19  1259   MAGNESIUM mg/dL 2 4 2 1 2 2     Lab Results   Component Value Date    PHOS 5 5 (H) 07/27/2019      Results from last 7 days   Lab Units 07/26/19  1259   INR  1 02   PTT seconds 29     0   Lab Value Date/Time    TROPONINI 0 07 (H) 07/26/2019 1259    TROPONINI 0 04 05/04/2018 0642    TROPONINI 0 03 05/04/2018 0337    TROPONINI 0 05 (H) 05/04/2018 0025    TROPONINI 0 04 04/30/2018 1535     Results from last 7 days   Lab Units 07/26/19  1259   LACTIC ACID mmol/L 1 3     ABG:  Lab Results   Component Value Date    PHART 7 372 07/26/2019    JTQ8PYI 65 1 (HH) 07/26/2019    PO2ART 135 2 (H) 07/26/2019    TRP7VUG 37 0 (H) 07/26/2019    BEART 9 0 07/26/2019    SOURCE Radial, Right 07/26/2019       Imaging: CXR:   XR chest portable   Final Result      Bibasilar opacities representing atelectasis or pneumonia  Workstation performed: [id number]         XR chest 1 view portable   Final Result      Increased interstitial and vascular congestion with bibasilar densities and probable small effusions  Workstation performed: [id number]         XR chest portable    (Results Pending)      I have personally reviewed pertinent reports  ECHO: severe pulmonary htn, moderate MR        Micro:  Lab Results   Component Value Date    BLOODCX No Growth at 24 hrs  07/26/2019    BLOODCX No Growth at 24 hrs  07/26/2019    BLOODCX No Growth After 5 Days  04/30/2018    URINECX No Growth <1000 cfu/mL 07/27/2019    SPUTUMCULTUR Culture too young- will reincubate 04/30/2018       Allergies:    Allergies   Allergen Reactions    Sulfa Antibiotics      Medications:   Scheduled Meds:    Current Facility-Administered Medications:  acetaminophen 650 mg Oral Q6H PRN Stephanie Manning, VINITA    budesonide 0 5 mg Nebulization Q12H Sheri Galvez, VINITA    cefepime 2,000 mg Intravenous Q12H Stephanie Manning, VINITA Last Rate: 2,000 mg (07/28/19 0314)   citalopram 10 mg Oral Daily Sheri Galvez, PA-[place]    docusate sodium 100 mg Oral Daily Sheri Galvez, PA-[place]    enoxaparin 40 mg Subcutaneous Daily Sheri Galvez, VINITA    ipratropium-albuterol 3 mL Nebulization Q6H PRN Stephanie Manning, PA-[place]    levalbuterol 0 63 mg Nebulization Q8H Sheri Galvez, PA-[place]    levothyroxine 75 mcg Oral Daily Sheri Galvez, PA-[place]    pantoprazole 40 mg Oral Daily Sheri Galvez, VINITA      Continuous Infusions:   PRN Meds:    acetaminophen 650 mg Q6H PRN   ipratropium-albuterol 3 mL Q6H PRN       Invasive lines and devices:   Invasive Devices     Peripheral Intravenous Line            Peripheral IV 07/26/19 Left Antecubital 2 days    Peripheral IV 07/26/19 Left Wrist 1 day                   SIGNATURE: Stephanie Manning PA-C  DATE: July 28, 2019

## 2019-07-29 NOTE — PLAN OF CARE
Problem: RESPIRATORY - ADULT  Goal: Achieves optimal ventilation and oxygenation  Description  INTERVENTIONS:  - Assess for changes in respiratory status  - Assess for changes in mentation and behavior  - Position to facilitate oxygenation and minimize respiratory effort  - Oxygen administration by appropriate delivery method based on oxygen saturation (per order) or ABGs  - Initiate smoking cessation education as indicated  - Encourage broncho-pulmonary hygiene including cough, deep breathe, Incentive Spirometry  - Assess the need for suctioning and aspirate as needed  - Assess and instruct to report SOB or any respiratory difficulty  - Respiratory Therapy support as indicated  Outcome: Progressing     Problem: PAIN - ADULT  Goal: Verbalizes/displays adequate comfort level or baseline comfort level  Description  Interventions:  - Encourage patient to monitor pain and request assistance  - Assess pain using appropriate pain scale  - Administer analgesics based on type and severity of pain and evaluate response  - Implement non-pharmacological measures as appropriate and evaluate response  - Consider cultural and social influences on pain and pain management  - Notify physician/advanced practitioner if interventions unsuccessful or patient reports new pain  Outcome: Progressing     Problem: INFECTION - ADULT  Goal: Absence or prevention of progression during hospitalization  Description  INTERVENTIONS:  - Assess and monitor for signs and symptoms of infection  - Monitor lab/diagnostic results  - Monitor all insertion sites, i e  indwelling lines, tubes, and drains  - Monitor endotracheal (as able) and nasal secretions for changes in amount and color  - [place] appropriate cooling/warming therapies per order  - Administer medications as ordered  - Instruct and encourage patient and family to use good hand hygiene technique  - Identify and instruct in appropriate isolation precautions for identified infection/condition  Outcome: Progressing  Goal: Absence of fever/infection during neutropenic period  Description  INTERVENTIONS:  - Monitor WBC  - Implement neutropenic guidelines  Outcome: Progressing     Problem: SAFETY ADULT  Goal: Patient will remain free of falls  Description  INTERVENTIONS:  - Assess patient frequently for physical needs  -  Identify cognitive and physical deficits and behaviors that affect risk of falls    -  [place] fall precautions as indicated by assessment   - Educate patient/family on patient safety including physical limitations  - Instruct patient to call for assistance with activity based on assessment  - Modify environment to reduce risk of injury  - Consider OT/PT consult to assist with strengthening/mobility  Outcome: Progressing  Goal: Maintain or return to baseline ADL function  Description  INTERVENTIONS:  -  Assess patient's ability to carry out ADLs; assess patient's baseline for ADL function and identify physical deficits which impact ability to perform ADLs (bathing, care of mouth/teeth, toileting, grooming, dressing, etc )  - Assess/evaluate cause of self-care deficits   - Assess range of motion  - Assess patient's mobility; develop plan if impaired  - Assess patient's need for assistive devices and provide as appropriate  - Encourage maximum independence but intervene and supervise when necessary  ¯ Involve family in performance of ADLs  ¯ Assess for home care needs following discharge   ¯ Request OT consult to assist with ADL evaluation and planning for discharge  ¯ Provide patient education as appropriate  Outcome: Progressing  Goal: Maintain or return mobility status to optimal level  Description  INTERVENTIONS:  - Assess patient's baseline mobility status (ambulation, transfers, stairs, etc )    - Identify cognitive and physical deficits and behaviors that affect mobility  - Identify mobility aids required to assist with transfers and/or ambulation (gait belt, sit-to-stand, lift, walker, cane, etc )  - [place] fall precautions as indicated by assessment  - Record patient progress and toleration of activity level on Mobility SBAR; progress patient to next Phase/Stage  - Instruct patient to call for assistance with activity based on assessment  - Request Rehabilitation consult to assist with strengthening/weightbearing, etc   Outcome: Progressing     Problem: DISCHARGE PLANNING  Goal: Discharge to home or other facility with appropriate resources  Description  INTERVENTIONS:  - Identify barriers to discharge w/patient and caregiver  - Arrange for needed discharge resources and transportation as appropriate  - Identify discharge learning needs (meds, wound care, etc )  - Arrange for interpretive services to assist at discharge as needed  - Refer to Case Management Department for coordinating discharge planning if the patient needs post-hospital services based on physician/advanced practitioner order or complex needs related to functional status, cognitive ability, or social support system  Outcome: Progressing     Problem: Knowledge Deficit  Goal: Patient/family/caregiver demonstrates understanding of disease process, treatment plan, medications, and discharge instructions  Description  Complete learning assessment and assess knowledge base    Interventions:  - Provide teaching at level of understanding  - Provide teaching via preferred learning methods  Outcome: Progressing     Problem: METABOLIC, FLUID AND ELECTROLYTES - ADULT  Goal: Electrolytes maintained within normal limits  Description  INTERVENTIONS:  - Monitor labs and assess patient for signs and symptoms of electrolyte imbalances  - Administer electrolyte replacement as ordered  - Monitor response to electrolyte replacements, including repeat lab results as appropriate  - Instruct patient on fluid and nutrition as appropriate  Outcome: Progressing  Goal: Fluid balance maintained  Description  INTERVENTIONS:  - Monitor labs and assess for signs and symptoms of volume excess or deficit  - Monitor I/O and WT  - Instruct patient on fluid and nutrition as appropriate  Outcome: Progressing     Problem: Potential for Falls  Goal: Patient will remain free of falls  Description  INTERVENTIONS:  - Assess patient frequently for physical needs  -  Identify cognitive and physical deficits and behaviors that affect risk of falls  -  [place] fall precautions as indicated by assessment   - Educate patient/family on patient safety including physical limitations  - Instruct patient to call for assistance with activity based on assessment  - Modify environment to reduce risk of injury  - Consider OT/PT consult to assist with strengthening/mobility  Outcome: Progressing     Problem: Prexisting or High Potential for Compromised Skin Integrity  Goal: Skin integrity is maintained or improved  Description  INTERVENTIONS:  - Identify patients at risk for skin breakdown  - Assess and monitor skin integrity  - Assess and monitor nutrition and hydration status  - Monitor labs (i e  albumin)  - Assess for incontinence   - Turn and reposition patient  - Assist with mobility/ambulation  - Relieve pressure over bony prominences  - Avoid friction and shearing  - Provide appropriate hygiene as needed including keeping skin clean and dry  - Evaluate need for skin moisturizer/barrier cream  - Collaborate with interdisciplinary team (i e  Nutrition, Rehabilitation, etc )   - Patient/family teaching  Outcome: Progressing     Problem: Nutrition/Hydration-ADULT  Goal: Nutrient/Hydration intake appropriate for improving, restoring or maintaining nutritional needs  Description  Monitor and assess patient's nutrition/hydration status for malnutrition (ex- brittle hair, bruises, dry skin, pale skin and conjunctiva, muscle wasting, smooth red tongue, and disorientation)  Collaborate with interdisciplinary team and initiate plan and interventions as ordered  Monitor patient's weight and dietary intake as ordered or per policy  Utilize nutrition screening tool and intervene per policy  Determine patient's food preferences and provide high-protein, high-caloric foods as appropriate       INTERVENTIONS:  - Monitor oral intake, urinary output, labs, and treatment plans  - Assess nutrition and hydration status and recommend course of action  - Evaluate amount of meals eaten  - Assist patient with eating if necessary   - Allow adequate time for meals  - Recommend/ encourage appropriate diets, oral nutritional supplements, and vitamin/mineral supplements  - Order, calculate, and assess calorie counts as needed  - Recommend, monitor, and adjust tube feedings and TPN/PPN based on assessed needs  - Assess need for intravenous fluids  - Provide specific nutrition/hydration education as appropriate  - Include patient/family/caregiver in decisions related to nutrition  Outcome: Progressing

## 2019-07-29 NOTE — SOCIAL WORK
LOS - 4 days    SW met with pt and family to assess needs and discuss plans  Discussed goals of making sure pt's needs are met upon discharge  Pt is a resident of [facility] [employer]  Per pt she is a self-propel wheelchair, sometimes uses her walker  Receives assistance from assisted living staff for ADLs  Recommendation has already been made for short term rehab once pt is medically stable  Discussed option briefly with pt and family  Offered to follow to monitor progress and assist with planning once appropriate level of care is determined  Message left for [facility] to discuss prior level of function and potential plans  SW will continue to follow to monitor needs/progress and assist with planning as needed

## 2019-07-29 NOTE — PLAN OF CARE
Problem: RESPIRATORY - ADULT  Goal: Achieves optimal ventilation and oxygenation  Description  INTERVENTIONS:  - Assess for changes in respiratory status  - Assess for changes in mentation and behavior  - Position to facilitate oxygenation and minimize respiratory effort  - Oxygen administration by appropriate delivery method based on oxygen saturation (per order) or ABGs  - Initiate smoking cessation education as indicated  - Encourage broncho-pulmonary hygiene including cough, deep breathe, Incentive Spirometry  - Assess the need for suctioning and aspirate as needed  - Assess and instruct to report SOB or any respiratory difficulty  - Respiratory Therapy support as indicated  Outcome: Progressing     Problem: PAIN - ADULT  Goal: Verbalizes/displays adequate comfort level or baseline comfort level  Description  Interventions:  - Encourage patient to monitor pain and request assistance  - Assess pain using appropriate pain scale  - Administer analgesics based on type and severity of pain and evaluate response  - Implement non-pharmacological measures as appropriate and evaluate response  - Consider cultural and social influences on pain and pain management  - Notify physician/advanced practitioner if interventions unsuccessful or patient reports new pain  Outcome: Progressing     Problem: INFECTION - ADULT  Goal: Absence or prevention of progression during hospitalization  Description  INTERVENTIONS:  - Assess and monitor for signs and symptoms of infection  - Monitor lab/diagnostic results  - Monitor all insertion sites, i e  indwelling lines, tubes, and drains  - Monitor endotracheal (as able) and nasal secretions for changes in amount and color  - [place] appropriate cooling/warming therapies per order  - Administer medications as ordered  - Instruct and encourage patient and family to use good hand hygiene technique  - Identify and instruct in appropriate isolation precautions for identified infection/condition  Outcome: Progressing  Goal: Absence of fever/infection during neutropenic period  Description  INTERVENTIONS:  - Monitor WBC  - Implement neutropenic guidelines  Outcome: Progressing     Problem: SAFETY ADULT  Goal: Patient will remain free of falls  Description  INTERVENTIONS:  - Assess patient frequently for physical needs  -  Identify cognitive and physical deficits and behaviors that affect risk of falls    -  [place] fall precautions as indicated by assessment   - Educate patient/family on patient safety including physical limitations  - Instruct patient to call for assistance with activity based on assessment  - Modify environment to reduce risk of injury  - Consider OT/PT consult to assist with strengthening/mobility  Outcome: Progressing  Goal: Maintain or return to baseline ADL function  Description  INTERVENTIONS:  -  Assess patient's ability to carry out ADLs; assess patient's baseline for ADL function and identify physical deficits which impact ability to perform ADLs (bathing, care of mouth/teeth, toileting, grooming, dressing, etc )  - Assess/evaluate cause of self-care deficits   - Assess range of motion  - Assess patient's mobility; develop plan if impaired  - Assess patient's need for assistive devices and provide as appropriate  - Encourage maximum independence but intervene and supervise when necessary  ¯ Involve family in performance of ADLs  ¯ Assess for home care needs following discharge   ¯ Request OT consult to assist with ADL evaluation and planning for discharge  ¯ Provide patient education as appropriate  Outcome: Progressing  Goal: Maintain or return mobility status to optimal level  Description  INTERVENTIONS:  - Assess patient's baseline mobility status (ambulation, transfers, stairs, etc )    - Identify cognitive and physical deficits and behaviors that affect mobility  - Identify mobility aids required to assist with transfers and/or ambulation (gait belt, sit-to-stand, lift, walker, cane, etc )  - [place] fall precautions as indicated by assessment  - Record patient progress and toleration of activity level on Mobility SBAR; progress patient to next Phase/Stage  - Instruct patient to call for assistance with activity based on assessment  - Request Rehabilitation consult to assist with strengthening/weightbearing, etc   Outcome: Progressing     Problem: DISCHARGE PLANNING  Goal: Discharge to home or other facility with appropriate resources  Description  INTERVENTIONS:  - Identify barriers to discharge w/patient and caregiver  - Arrange for needed discharge resources and transportation as appropriate  - Identify discharge learning needs (meds, wound care, etc )  - Arrange for interpretive services to assist at discharge as needed  - Refer to Case Management Department for coordinating discharge planning if the patient needs post-hospital services based on physician/advanced practitioner order or complex needs related to functional status, cognitive ability, or social support system  Outcome: Progressing     Problem: Knowledge Deficit  Goal: Patient/family/caregiver demonstrates understanding of disease process, treatment plan, medications, and discharge instructions  Description  Complete learning assessment and assess knowledge base    Interventions:  - Provide teaching at level of understanding  - Provide teaching via preferred learning methods  Outcome: Progressing     Problem: METABOLIC, FLUID AND ELECTROLYTES - ADULT  Goal: Electrolytes maintained within normal limits  Description  INTERVENTIONS:  - Monitor labs and assess patient for signs and symptoms of electrolyte imbalances  - Administer electrolyte replacement as ordered  - Monitor response to electrolyte replacements, including repeat lab results as appropriate  - Instruct patient on fluid and nutrition as appropriate  Outcome: Progressing  Goal: Fluid balance maintained  Description  INTERVENTIONS:  - Monitor labs and assess for signs and symptoms of volume excess or deficit  - Monitor I/O and WT  - Instruct patient on fluid and nutrition as appropriate  Outcome: Progressing     Problem: Potential for Falls  Goal: Patient will remain free of falls  Description  INTERVENTIONS:  - Assess patient frequently for physical needs  -  Identify cognitive and physical deficits and behaviors that affect risk of falls  -  [place] fall precautions as indicated by assessment   - Educate patient/family on patient safety including physical limitations  - Instruct patient to call for assistance with activity based on assessment  - Modify environment to reduce risk of injury  - Consider OT/PT consult to assist with strengthening/mobility  Outcome: Progressing     Problem: Prexisting or High Potential for Compromised Skin Integrity  Goal: Skin integrity is maintained or improved  Description  INTERVENTIONS:  - Identify patients at risk for skin breakdown  - Assess and monitor skin integrity  - Assess and monitor nutrition and hydration status  - Monitor labs (i e  albumin)  - Assess for incontinence   - Turn and reposition patient  - Assist with mobility/ambulation  - Relieve pressure over bony prominences  - Avoid friction and shearing  - Provide appropriate hygiene as needed including keeping skin clean and dry  - Evaluate need for skin moisturizer/barrier cream  - Collaborate with interdisciplinary team (i e  Nutrition, Rehabilitation, etc )   - Patient/family teaching  Outcome: Progressing     Problem: Nutrition/Hydration-ADULT  Goal: Nutrient/Hydration intake appropriate for improving, restoring or maintaining nutritional needs  Description  Monitor and assess patient's nutrition/hydration status for malnutrition (ex- brittle hair, bruises, dry skin, pale skin and conjunctiva, muscle wasting, smooth red tongue, and disorientation)  Collaborate with interdisciplinary team and initiate plan and interventions as ordered  Monitor patient's weight and dietary intake as ordered or per policy  Utilize nutrition screening tool and intervene per policy  Determine patient's food preferences and provide high-protein, high-caloric foods as appropriate       INTERVENTIONS:  - Monitor oral intake, urinary output, labs, and treatment plans  - Assess nutrition and hydration status and recommend course of action  - Evaluate amount of meals eaten  - Assist patient with eating if necessary   - Allow adequate time for meals  - Recommend/ encourage appropriate diets, oral nutritional supplements, and vitamin/mineral supplements  - Order, calculate, and assess calorie counts as needed  - Recommend, monitor, and adjust tube feedings and TPN/PPN based on assessed needs  - Assess need for intravenous fluids  - Provide specific nutrition/hydration education as appropriate  - Include patient/family/caregiver in decisions related to nutrition  Outcome: Progressing

## 2019-07-29 NOTE — PROGRESS NOTES
Daily Progress Note - Critical Care/ Jaclyn Memos 80 y o  female MRN: [id number]  Unit/Bed#: ICU 05 Encounter: [id number]    ______________________________________________________________________  Assessment:   Principal Problem:    Acute on chronic respiratory failure with hypoxia (HCC)  Active Problems:    Diastolic heart failure (HCC)    Hyponatremia    Murmur    Urinary tract infection without hematuria    Scoliosis    Restless leg    Hypothyroidism  Resolved Problems:    * No resolved hospital problems  *      * Acute on chronic respiratory failure with hypoxia (HCC)  Assessment & Plan  · Patient tachypneic with RR in the 40s in the ER  Placed on BiPAP 14/6 and 35% for increased work of breathing   · Pt given lasix in ED for suspected vascular congestion  · CXR with bibasilar opacities - atelectasis vs infection  Procal remains negative - low suspicion for PNA- continue to monitor off abx  · Pulmonary hypertension and dilated cardiomyopathy on echo  PAP 70mmHg  EF 55%  Dilated vena cava  · Pt remains on Optiflow (does not tolerate BiPap)  50L/45%  Wean as tolerated  · Evaluate right lung for possible thoracentesis versus bronchoscopy with ultrasound  Pleural effusion versus consolidation  Continue aggressive chest PT and scheduled breathing treatments   · Patient does have home oxygen needs  2 L nasal cannula with exertion but no oxygen needs at rest   ABG suspicious for chronic hypercapnic respiratory failure with full compensation    · Continue Diuresis  · Appreciate Pulm input    Diastolic heart failure (HCC)  Assessment & Plan  Wt Readings from Last 3 Encounters:   07/29/19 67 5 kg (148 lb 13 oz)   05/23/18 62 1 kg (137 lb)   05/16/18 62 3 kg (137 lb 6 4 oz)     · Patient's weight up 1 7 kilos since admission- up 5 kilos since admission in July  · Mildly dilated vena cava on echo  · Continue diuresis    Hyponatremia  Assessment & Plan  · Hypo-osmolar hyponatremia  · Sodium currently at 128  Patient without symptoms  · Continue diuresis      Murmur  Assessment & Plan  · Echocardiogram demonstrates mild MR and significant pulmonary hypertension with PA P of 72    Urinary tract infection without hematuria  Assessment & Plan  · No infection present  · Abx d/c 7/28  · Observe off of abx    Hypothyroidism  Assessment & Plan  · Continue home Synthroid dose    Restless leg  Assessment & Plan  Restart home medications    Scoliosis  Assessment & Plan  · Tylenol for pain  · Supportive care  · Pulmonary hygiene       Plan:  Neuro:      · Delirium: CAM ICU positive no   ? If yes, intervention: regulate sleep wake cycle  environmental controls  ? Patient not sleeping overnight  High risk for delirium  · Pain controlled with: acetaminophen  ? Pain score: moderate - back  · Regulate sleep/wake cycle     CV:   ? Severe pulmonary hypertension with mild/moderate MR - continue diuresis  ? Patient net -1 4 L for 24 hours     Pulm:   · Acute on chronic hypoxic and hypercarbic respiratory failure -likely related to cardiac dysfunction-  diurese - no evidence of PNA  Wean HFNC as tolerated      GI:   · Nutrition/diet plan: House diet with fluid restriction     FEN:      · Fluid/Diuretic plan: Needs diuresis lasix IV  · Electrolytes repleted: yes  ? Goal: K >4 0, Mag >2 0, and Phos >3 0     :   · Indwelling Roberto present: no      ID:      · Discontinue abx  UC negative  Procal negative      Heme:      · Trend hgb and plts     Endo:      · Glycemic control plan: Blood glucose controlled on current regimen     Msk/Skin:  · Mobility goal: OOB with assistance  ? PT consult: yes  ?  OT consult: yes  · Frequent turning and off-loading     Family:  · Family updated within 24 hours: yes   · Family meeting planned today: no      Lines:  · PIV     VTE Prophylaxis:  · Pharmacologic Prophylaxis: Enoxaparin (Lovenox)  · Mechanical Prophylaxis: sequential compression device    Disposition: Continue Stepdown    Code Status: Level 3 - DNAR and DNI    Counseling / Coordination of Care  Total time spent today 35 minutes  Greater than 50% of total time was spent with the patient and / or family counseling and / or coordination of care  A description of the counseling / coordination of care: Discussed with patient  Discussed with nursing  Discussed with attending   ______________________________________________________________________    HPI/24hr events:  Patient with difficulty sleeping  Continues on high-flow nasal cannula  Diuresed 1 4 L    ______________________________________________________________________    Physical Exam:   Physical Exam   Constitutional: She is oriented to person, place, and time  She appears well-developed and well-nourished  HENT:   Head: Normocephalic and atraumatic  Eyes: Pupils are equal, round, and reactive to light  No scleral icterus  Neck: Normal range of motion  JVD present  Cardiovascular: Normal rate and regular rhythm  Murmur heard  Pulmonary/Chest: Effort normal  She has rales  Abdominal: Soft  Bowel sounds are normal  She exhibits no distension  Musculoskeletal: Normal range of motion  She exhibits edema  Neurological: She is alert and oriented to person, place, and time  No cranial nerve deficit  Skin: Skin is warm and dry  Capillary refill takes less than 2 seconds  No rash noted  She is not diaphoretic  No pallor  ______________________________________________________________________  Vitals:    19 0474 19 0600 19 0710 19 0727   BP:  130/92     BP Location:       Pulse:  97     Resp:  (!) 31     Temp:   97 8 °F (36 6 °C)    TempSrc:   Temporal    SpO2:  93%  94%   Weight: 67 5 kg (148 lb 13 oz)      Height:                  Temperature:   Temp (24hrs), Av °F (36 7 °C), Min:97 2 °F (36 2 °C), Max:98 6 °F (37 °C)    Current Temperature: 97 8 °F (36 6 °C)    Weights:   IBW: 40 9 kg    Body mass index is 31 1 kg/m²    Weight (last 2 days) Date/Time   Weight    07/29/19 0548   67 5 (148 81)    07/28/19 0722   68 9 (151 9)    07/27/19 0538   66 5 (146 61)              Hemodynamic Monitoring:  N/A       Non-Invasive/Invasive Ventilation Settings:  Respiratory    Lab Data (Last 4 hours)    None         O2/Vent Data (Last 4 hours)    None              No results found for: PHART, CSH6ZXA, PO2ART, QMG9INA, E7RGCYSN, BEART, SOURCE  SpO2: SpO2: 94 %, SpO2 Activity: SpO2 Activity: At Rest, SpO2 Device: O2 Device: Other (comment)(HFNC)    Intake and Outputs:  I/O       07/27 0701 - 07/28 0700 07/28 0701 - 07/29 0700 07/29 0701 - 07/30 0700    P  O  0 890     I V  (mL/kg) 1540 (23 2) 20 (0 3)     IV Piggyback 250 50     Total Intake(mL/kg) 1790 (26 9) 960 (14 2)     Urine (mL/kg/hr) 796 (0 5) 1430 (0 9)     Total Output 796 1430     Net +994 -470                    Nutrition:        Diet Orders   (From admission, onward)             Start     Ordered    07/28/19 0247  Diet Regular; Regular House  Diet effective now     Question Answer Comment   Diet Type Regular    Regular Regular House    RD to adjust diet per protocol?  Yes        07/28/19 0246                  Labs:   Results from last 7 days   Lab Units 07/27/19  0441 07/26/19  1259   WBC Thousand/uL 9 50 10 72*   HEMOGLOBIN g/dL 13 3 14 9   HEMATOCRIT % 42 3 46 4*   PLATELETS Thousands/uL 291 340   NEUTROS PCT % 75 77*   MONOS PCT % 15* 9     Results from last 7 days   Lab Units 07/29/19  0542 07/28/19  2043 07/28/19  0948  07/27/19  0441  07/26/19  1259   SODIUM mmol/L 129* 130* 130*   < > 124*   < > 123*   POTASSIUM mmol/L 4 5 4 7 4 7   < > 4 3   < > 4 1   CHLORIDE mmol/L 90* 89* 89*   < > 86*   < > 83*   CO2 mmol/L 37* 41* 38*   < > 38*   < > 37*   BUN mg/dL 20 22 22   < > 17   < > 17   CREATININE mg/dL 0 47* 0 62 0 54*   < > 0 51*   < > 0 54*   CALCIUM mg/dL 8 4 8 1* 8 3   < > 8 0*   < > 8 8   ALK PHOS U/L  --   --   --   --  63  --  81   ALT U/L  --   --   --   --  42  --  48   AST U/L  --   --   -- --  29  --  35    < > = values in this interval not displayed  Results from last 7 days   Lab Units 07/29/19  0542 07/28/19  0500 07/27/19  0441   MAGNESIUM mg/dL 2 5 2 4 2 1     Lab Results   Component Value Date    PHOS 5 5 (H) 07/27/2019      Results from last 7 days   Lab Units 07/26/19  1259   INR  1 02   PTT seconds 29     0   Lab Value Date/Time    TROPONINI 0 07 (H) 07/26/2019 1259    TROPONINI 0 04 05/04/2018 0642    TROPONINI 0 03 05/04/2018 0337    TROPONINI 0 05 (H) 05/04/2018 0025    TROPONINI 0 04 04/30/2018 1535     Results from last 7 days   Lab Units 07/26/19  1259   LACTIC ACID mmol/L 1 3     ABG:  Lab Results   Component Value Date    PHART 7 372 07/26/2019    [id number] 65 1 (HH) 07/26/2019    PO2ART 135 2 (H) 07/26/2019    KHS6LYX 37 0 (H) 07/26/2019    BEART 9 0 07/26/2019    SOURCE Radial, Right 07/26/2019       Imaging: CXR:   XR chest portable   Final Result      Worsening airspace disease, right greater than left suggesting laminar edema and effusions  Infection not excluded  Workstation performed: [id number]         XR chest portable   Final Result      Bibasilar opacities representing atelectasis or pneumonia  Workstation performed: [id number]         XR chest 1 view portable   Final Result      Increased interstitial and vascular congestion with bibasilar densities and probable small effusions  Workstation performed: [id number]         XR chest portable    (Results Pending)   XR chest portable    (Results Pending)      I have personally reviewed pertinent reports  and I have personally reviewed pertinent films in PACS  ECHO:  Severe pulmonary hypertension I have personally reviewed pertinent reports  EKG:  Normal sinus rhythm    Micro:  Lab Results   Component Value Date    BLOODCX No Growth at 48 hrs  07/26/2019    BLOODCX No Growth at 48 hrs  07/26/2019    BLOODCX No Growth After 5 Days   04/30/2018    URINECX No Growth <1000 cfu/mL 07/27/2019 SPUTUMCULTUR Culture too young- will reincubate 04/30/2018       Allergies: Allergies   Allergen Reactions    Sulfa Antibiotics      Medications:   Scheduled Meds:    Current Facility-Administered Medications:  acetaminophen 650 mg Oral Q6H PRN Calvin Anderson PA-C   budesonide 0 5 mg Nebulization Q12H Sheri Galvez, VINITA   citalopram 10 mg Oral Daily Sheri Galvez, VINITA   docusate sodium 100 mg Oral Daily Sheri Galvez, VINITA   enoxaparin 40 mg Subcutaneous Daily Sheri Galvez, VINITA   furosemide 40 mg Intravenous Once Sheri Galvez, VINITA   ipratropium-albuterol 3 mL Nebulization Q6H PRN Calvin Anderson PA-C   levalbuterol 0 63 mg Nebulization Q8H Sheri Galvez, VINITA   levothyroxine 75 mcg Oral Daily Sheri Galvez, VINITA   melatonin 3 mg Oral HS ARNIE Queen   pantoprazole 40 mg Oral Daily Sheri Galvez, VINITA   pramipexole 0 125 mg Oral TID ARNIE Queen   primidone 50 mg Oral Q12H [address] Sheri Galvez PA-C     Continuous Infusions:   PRN Meds:    acetaminophen 650 mg Q6H PRN   ipratropium-albuterol 3 mL Q6H PRN       Invasive lines and devices:   Invasive Devices     Peripheral Intravenous Line            Peripheral IV 07/26/19 Left Antecubital 3 days    Peripheral IV 07/26/19 Left Wrist 2 days                   SIGNATURE: Calvin Anderson PA-C  DATE: July 29, 2019

## 2019-07-30 PROBLEM — N39.0 URINARY TRACT INFECTION WITHOUT HEMATURIA: Status: RESOLVED | Noted: 2019-01-01 | Resolved: 2019-01-01

## 2019-07-30 PROBLEM — I27.20 PULMONARY HYPERTENSION (HCC): Status: ACTIVE | Noted: 2019-01-01

## 2019-07-30 NOTE — PROGRESS NOTES
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  Benign familial tremor  Pulmonary artery hypertension may be due to effects of chronic hypoxemia from her scoliosis and possibly due to some diastolic dysfunction  Estimated PA pressure is 72 mm Hg  Hyponatremia probably due to fluid overload    Plan:  I along with Dr Su Ibrahim spoke to the family at length  Concur with IV furosemide her patient was given dose of 40 mg IV this a m  And then 60 mg IV this evening  Patient does not want to use BiPAP for ventilatory support at bedtime  She states she cannot tolerate it      Subjective:   Patient states she does have shortness of breath with activity  Still does not feel well  Objective:     Vitals: Blood pressure 113/60, pulse 94, temperature 97 9 °F (36 6 °C), temperature source Temporal, resp  rate 22, height 4' 10" (1 473 m), weight 67 5 kg (148 lb 13 oz), SpO2 92 %  ,Body mass index is 31 1 kg/m²  Intake/Output Summary (Last 24 hours) at 7/29/2019 2110  Last data filed at 7/29/2019 2001  Gross per 24 hour   Intake 240 ml   Output 1550 ml   Net -1310 ml       Physical Exam: Physical Exam   Constitutional: She is oriented to person, place, and time  She appears well-developed and well-nourished  No distress  Patient is sitting in chair  No distress  She is on 45% Opti Flow nasal high-flow oxygen  No conversational dyspnea   HENT:   Head: Normocephalic  Nose: Nose normal    Mouth/Throat: Oropharynx is clear and moist  No oropharyngeal exudate  Eyes: Pupils are equal, round, and reactive to light  Conjunctivae are normal    Neck: Neck supple   No JVD present  No tracheal deviation present  Cardiovascular: Normal rate, regular rhythm and normal heart sounds  Pulmonary/Chest: Effort normal    Lung sounds are decreased inter some tubular breath sounds left base  Patient does have thoracic scoliosis with apex of the curvature to the right   Abdominal: Soft  She exhibits no distension  There is no tenderness  There is no guarding  Musculoskeletal:   Trace edema   Lymphadenopathy:     She has no cervical adenopathy  Neurological: She is alert and oriented to person, place, and time  Skin: Skin is warm and dry  No rash noted  Psychiatric: She has a normal mood and affect  Her behavior is normal  Thought content normal         Labs: I have personally reviewed pertinent lab results  , ABG:   Lab Results   Component Value Date    PHART 7 372 07/26/2019    [id number] 65 1 (HH) 07/26/2019    PO2ART 135 2 (H) 07/26/2019    [id number] 37 0 (H) 07/26/2019    BEART 9 0 07/26/2019    SOURCE Radial, Right 07/26/2019   , BNP: No results found for: BNP, CBC:   Lab Results   Component Value Date    WBC 9 50 07/27/2019    HGB 13 3 07/27/2019    HCT 42 3 07/27/2019    MCV 99 (H) 07/27/2019     07/27/2019    MCH 31 0 07/27/2019    MCHC 31 4 07/27/2019    RDW 13 6 07/27/2019    MPV 9 0 07/27/2019    NRBC 0 07/27/2019   , CMP:   Lab Results   Component Value Date    K 4 4 07/29/2019    CL 89 (L) 07/29/2019    CO2 37 (H) 07/29/2019    BUN 19 07/29/2019    CREATININE 0 61 07/29/2019    CALCIUM 8 0 (L) 07/29/2019    AST 29 07/27/2019    ALT 42 07/27/2019    ALKPHOS 63 07/27/2019    EGFR 79 07/29/2019   , PT/INR:   Lab Results   Component Value Date    INR 1 02 07/26/2019   , Troponin: No results found for: TROPONIN    Imaging and other studies: I have personally reviewed pertinent reports     and I have personally reviewed pertinent films in PACS

## 2019-07-30 NOTE — PLAN OF CARE
Problem: RESPIRATORY - ADULT  Goal: Achieves optimal ventilation and oxygenation  Description  INTERVENTIONS:  - Assess for changes in respiratory status  - Assess for changes in mentation and behavior  - Position to facilitate oxygenation and minimize respiratory effort  - Oxygen administration by appropriate delivery method based on oxygen saturation (per order) or ABGs  - Initiate smoking cessation education as indicated  - Encourage broncho-pulmonary hygiene including cough, deep breathe, Incentive Spirometry  - Assess the need for suctioning and aspirate as needed  - Assess and instruct to report SOB or any respiratory difficulty  - Respiratory Therapy support as indicated  Outcome: Progressing     Problem: PAIN - ADULT  Goal: Verbalizes/displays adequate comfort level or baseline comfort level  Description  Interventions:  - Encourage patient to monitor pain and request assistance  - Assess pain using appropriate pain scale  - Administer analgesics based on type and severity of pain and evaluate response  - Implement non-pharmacological measures as appropriate and evaluate response  - Consider cultural and social influences on pain and pain management  - Notify physician/advanced practitioner if interventions unsuccessful or patient reports new pain  Outcome: Progressing     Problem: INFECTION - ADULT  Goal: Absence or prevention of progression during hospitalization  Description  INTERVENTIONS:  - Assess and monitor for signs and symptoms of infection  - Monitor lab/diagnostic results  - Monitor all insertion sites, i e  indwelling lines, tubes, and drains  - Monitor endotracheal (as able) and nasal secretions for changes in amount and color  - [place] appropriate cooling/warming therapies per order  - Administer medications as ordered  - Instruct and encourage patient and family to use good hand hygiene technique  - Identify and instruct in appropriate isolation precautions for identified infection/condition  Outcome: Progressing  Goal: Absence of fever/infection during neutropenic period  Description  INTERVENTIONS:  - Monitor WBC  - Implement neutropenic guidelines  Outcome: Progressing     Problem: SAFETY ADULT  Goal: Patient will remain free of falls  Description  INTERVENTIONS:  - Assess patient frequently for physical needs  -  Identify cognitive and physical deficits and behaviors that affect risk of falls    -  Falls fall precautions as indicated by assessment   - Educate patient/family on patient safety including physical limitations  - Instruct patient to call for assistance with activity based on assessment  - Modify environment to reduce risk of injury  - Consider OT/PT consult to assist with strengthening/mobility  Outcome: Progressing  Goal: Maintain or return to baseline ADL function  Description  INTERVENTIONS:  -  Assess patient's ability to carry out ADLs; assess patient's baseline for ADL function and identify physical deficits which impact ability to perform ADLs (bathing, care of mouth/teeth, toileting, grooming, dressing, etc )  - Assess/evaluate cause of self-care deficits   - Assess range of motion  - Assess patient's mobility; develop plan if impaired  - Assess patient's need for assistive devices and provide as appropriate  - Encourage maximum independence but intervene and supervise when necessary  ¯ Involve family in performance of ADLs  ¯ Assess for home care needs following discharge   ¯ Request OT consult to assist with ADL evaluation and planning for discharge  ¯ Provide patient education as appropriate  Outcome: Progressing  Goal: Maintain or return mobility status to optimal level  Description  INTERVENTIONS:  - Assess patient's baseline mobility status (ambulation, transfers, stairs, etc )    - Identify cognitive and physical deficits and behaviors that affect mobility  - Identify mobility aids required to assist with transfers and/or ambulation (gait belt, sit-to-stand, lift, walker, cane, etc )  - [place] fall precautions as indicated by assessment  - Record patient progress and toleration of activity level on Mobility SBAR; progress patient to next Phase/Stage  - Instruct patient to call for assistance with activity based on assessment  - Request Rehabilitation consult to assist with strengthening/weightbearing, etc   Outcome: Progressing     Problem: DISCHARGE PLANNING  Goal: Discharge to home or other facility with appropriate resources  Description  INTERVENTIONS:  - Identify barriers to discharge w/patient and caregiver  - Arrange for needed discharge resources and transportation as appropriate  - Identify discharge learning needs (meds, wound care, etc )  - Arrange for interpretive services to assist at discharge as needed  - Refer to Case Management Department for coordinating discharge planning if the patient needs post-hospital services based on physician/advanced practitioner order or complex needs related to functional status, cognitive ability, or social support system  Outcome: Progressing     Problem: Knowledge Deficit  Goal: Patient/family/caregiver demonstrates understanding of disease process, treatment plan, medications, and discharge instructions  Description  Complete learning assessment and assess knowledge base    Interventions:  - Provide teaching at level of understanding  - Provide teaching via preferred learning methods  Outcome: Progressing     Problem: METABOLIC, FLUID AND ELECTROLYTES - ADULT  Goal: Electrolytes maintained within normal limits  Description  INTERVENTIONS:  - Monitor labs and assess patient for signs and symptoms of electrolyte imbalances  - Administer electrolyte replacement as ordered  - Monitor response to electrolyte replacements, including repeat lab results as appropriate  - Instruct patient on fluid and nutrition as appropriate  Outcome: Progressing  Goal: Fluid balance maintained  Description  INTERVENTIONS:  - Monitor labs and assess for signs and symptoms of volume excess or deficit  - Monitor I/O and WT  - Instruct patient on fluid and nutrition as appropriate  Outcome: Progressing     Problem: Potential for Falls  Goal: Patient will remain free of falls  Description  INTERVENTIONS:  - Assess patient frequently for physical needs  -  Identify cognitive and physical deficits and behaviors that affect risk of falls  -  [place] fall precautions as indicated by assessment   - Educate patient/family on patient safety including physical limitations  - Instruct patient to call for assistance with activity based on assessment  - Modify environment to reduce risk of injury  - Consider OT/PT consult to assist with strengthening/mobility  Outcome: Progressing     Problem: Prexisting or High Potential for Compromised Skin Integrity  Goal: Skin integrity is maintained or improved  Description  INTERVENTIONS:  - Identify patients at risk for skin breakdown  - Assess and monitor skin integrity  - Assess and monitor nutrition and hydration status  - Monitor labs (i e  albumin)  - Assess for incontinence   - Turn and reposition patient  - Assist with mobility/ambulation  - Relieve pressure over bony prominences  - Avoid friction and shearing  - Provide appropriate hygiene as needed including keeping skin clean and dry  - Evaluate need for skin moisturizer/barrier cream  - Collaborate with interdisciplinary team (i e  Nutrition, Rehabilitation, etc )   - Patient/family teaching  Outcome: Progressing     Problem: Nutrition/Hydration-ADULT  Goal: Nutrient/Hydration intake appropriate for improving, restoring or maintaining nutritional needs  Description  Monitor and assess patient's nutrition/hydration status for malnutrition (ex- brittle hair, bruises, dry skin, pale skin and conjunctiva, muscle wasting, smooth red tongue, and disorientation)  Collaborate with interdisciplinary team and initiate plan and interventions as ordered  Monitor patient's weight and dietary intake as ordered or per policy  Utilize nutrition screening tool and intervene per policy  Determine patient's food preferences and provide high-protein, high-caloric foods as appropriate       INTERVENTIONS:  - Monitor oral intake, urinary output, labs, and treatment plans  - Assess nutrition and hydration status and recommend course of action  - Evaluate amount of meals eaten  - Assist patient with eating if necessary   - Allow adequate time for meals  - Recommend/ encourage appropriate diets, oral nutritional supplements, and vitamin/mineral supplements  - Order, calculate, and assess calorie counts as needed  - Recommend, monitor, and adjust tube feedings and TPN/PPN based on assessed needs  - Assess need for intravenous fluids  - Provide specific nutrition/hydration education as appropriate  - Include patient/family/caregiver in decisions related to nutrition  Outcome: Progressing

## 2019-07-30 NOTE — OCCUPATIONAL THERAPY NOTE
OT TREATMENT       07/30/19 1538   Restrictions/Precautions   Other Precautions Chair Alarm; Bed Alarm; Fall Risk;O2;Multiple lines  (in ICU, high flow O2)   Pain Assessment   Pain Assessment No/denies pain   Bed Mobility   Sit to Supine 2  Maximal assistance   Additional items Assist x 2;LE management   Transfers   Sit to Stand 3  Moderate assistance   Additional items Assist x 2   Stand to Sit 2  Maximal assistance   Additional items Assist x 2   Stand pivot 2  Maximal assistance  (chair to bed with RW)   Additional items Assist x 2   Functional Mobility   Functional Mobility 2  Maximal assistance   Additional Comments assist of 2, few steps bed to chair   Additional items Rolling walker   ROM- Right Upper Extremities   R Shoulder AROM; Flexion; Horizontal ABduction   R Elbow AROM;Elbow flexion;Elbow extension   R Hand AROM; Index finger; Thumb; Long finger;Ring finger;Little finger   R Weight/Reps/Sets 10 times each seated in chair with verbal cues    ROM - Left Upper Extremities    L Shoulder AROM; Flexion; Horizontal ABduction   L Elbow AROM;Elbow flexion;Elbow extension   L Hand AROM; Thumb; Index finger; Long finger;Ring finger;Little finger   L Weight/Reps/Sets 10 times each seated in chair with verbal cues    Assessment   Assessment Patient is lethargic requiring verbal cues to complete activity  Patient requires max assist of 2 for transfers  Patient is generally weak and deconditioned  Patient would benefit from STR  Plan   Treatment Interventions ADL retraining;Functional transfer training;UE strengthening/ROM; Endurance training;Patient/family training;Equipment evaluation/education; Activityengagement; Compensatory technique education   Recommendation   OT Discharge Recommendation [address] [facility] License Number  [facility] 87 OTR/L [id number]

## 2019-07-30 NOTE — PROGRESS NOTES
Daily Progress Note - Critical Care/ Clarisse Clifford 80 y o  female MRN: [id number]  Unit/Bed#: ICU 05 Encounter: [id number]    ______________________________________________________________________  Assessment:   Principal Problem:    Acute on chronic respiratory failure with hypoxia (HCC)  Active Problems:    Pulmonary hypertension (HCC)    Hyponatremia    Murmur    Urinary tract infection without hematuria    Scoliosis    Restless leg    Hypothyroidism  Resolved Problems:    * No resolved hospital problems  *      * Acute on chronic respiratory failure with hypoxia (HCC)  Assessment & Plan  · Patient tachypneic with RR in the 40s in the ER  Placed on BiPAP 14/6 and 35% for increased work of breathing   · Pt given lasix in ED for suspected vascular congestion  · CXR with bibasilar opacities - atelectasis vs infection  Procal remains negative - low suspicion for PNA- continue to monitor off abx  · Patient does have home oxygen needs  2 L nasal cannula with exertion but no oxygen needs at rest   ABG suspicious for chronic hypercapnic respiratory failure with full compensation  · Echo showed EF 55% with pulmonary hypertension with estimated PAP of 70mmHg  EF 55%  Dilated vena cava  · Pt remains on Optiflow, will plan to wean to NC today as tolerated  · Continue aggressive chest PT and scheduled breathing treatments   · Continue Diuresis, received 40mg lasix yesterday morning and an additional 60mg dose last evening   -1L yesterday   · Pulmonary following, appreciate input     Pulmonary hypertension (HCC)  Assessment & Plan  Wt Readings from Last 3 Encounters:   07/29/19 67 5 kg (148 lb 13 oz)   05/23/18 62 1 kg (137 lb)   05/16/18 62 3 kg (137 lb 6 4 oz)     · Based on admitting presentation, SOB was thought to be secondary to diastolic dysfunction  · Echocardiogram did not show DD but did show severe pulmonary hypertension with estimated PAP 70s  · Patient's weight up 1 7 kilos since admission- up 5 kilos since admission in July  · Mildly dilated vena cava on echo  · Continue diuresis  · Yesterday patient was negative 1L /24 hours after 40mg lasix in the AM, additional 60mg in the evening  · Pulmonary following    Hyponatremia  Assessment & Plan  · Hypo-osmolar hyponatremia, serum osmo 280  · Urin osmo 351, urine sodium 39 (after receiving lasix)  · Admission sodium of 120, peaked at 128 but now downtrending again to 127  · Patient without symptoms  · Continue to monitor q12  · Continue diuresis      Murmur  Assessment & Plan  · Echocardiogram demonstrates mild MR and significant pulmonary hypertension with PA P of 72    Urinary tract infection without hematuria  Assessment & Plan  · No infection present  · Abx d/c 7/28  · Observe off of abx    Hypothyroidism  Assessment & Plan  · Continue home Synthroid dose    Restless leg  Assessment & Plan  · Home medications resumed    Scoliosis  Assessment & Plan  · Tylenol for pain  · Supportive care  · Scoliosis likely causing some pulmonary restrictive pathology   · Pulmonary hygiene      Plan:    Neuro:   · Delirium: CAM ICU positive no   · If yes, intervention: NA  · Pain controlled with: PRN tylenol, 2 5mg oxycodone  · Pain score: mild  · Regulate sleep/wake cycle  · Insomnia: sleep hygiene, melatonin HS, resumed restless leg medication   · Continue celexa     CV:   · Rhythm: NSR  · Follow rhythm on telemetry    Pulm:   Acute on chronic hypoxemic hypercapnic respiratory failure   · Severe pulmonary hypertension  · Continue diuresis  · Wean HFNC to nasal cannula  · continue neb treatments    GI:   · Nutrition/diet plan: regular diet   · Stress ulcer prophylaxis: Protonix PO  · Bowel regimen: Colace  · Last BM: PTA    FEN:   Hyponatremia  · Improving, most recently 127  · Fluid/Diuretic plan: Needs diuresis lasix IV  · Goal 24 hour fluid balance: -500mL-1L  · Electrolytes repleted: yes  · Goal: K >4 0, Mag >2 0, and Phos >3 0    :   · Indwelling Roberto present: no · accurate I&Os    ID:   · Patient initially on cefepime for possible pneumonia vs UTI coverage  · procalcitonin negative x 2, abx discontinued on 7/28   · Trend temps and WBC count    Heme:   · Trend hgb and plts    Endo:   · Continue synthroid  · Glycemic control plan: Blood glucose controlled on current regimen    Msk/Skin:  · Mobility goal: out of bed as tolerated   · PT consult: yes  · OT consult: yes  · Frequent turning and off-loading    Family:  · Family updated within 24 hours: yes   · Family meeting planned today: no     Lines:  · PIV    VTE Prophylaxis:  · Pharmacologic Prophylaxis: Enoxaparin (Lovenox)  · Mechanical Prophylaxis: sequential compression device    Disposition: Continue Stepdown    Code Status: Level 3 - DNAR and DNI    Counseling / Coordination of Care  Total time spent today 32 minutes  Greater than 50% of total time was spent with the patient and / or family counseling and / or coordination of care  A description of the counseling / coordination of care:   ______________________________________________________________________    HPI/24hr events: No acute events overnight  HFNC requirements lessened  Hemodynamically stable  Patient remains anxious at times overnight secondary to inability to sleep  Denies any other complaints      ______________________________________________________________________    Physical Exam:   Physical Exam   Constitutional: She is oriented to person, place, and time  She appears well-developed and well-nourished  No distress  HENT:   Head: Normocephalic and atraumatic  Eyes: Pupils are equal, round, and reactive to light  EOM are normal  No scleral icterus  Neck: Normal range of motion  Neck supple  Cardiovascular: Normal rate and regular rhythm  Murmur heard  Pulmonary/Chest: No accessory muscle usage  No respiratory distress  She has decreased breath sounds  She has no wheezes  She has no rhonchi   She has rales in the right lower field and the left lower field  Abdominal: Soft  Bowel sounds are normal  She exhibits no distension  There is no tenderness  Musculoskeletal: Normal range of motion  She exhibits edema  Mild lower extremity edema    Neurological: She is alert and oriented to person, place, and time  Skin: Skin is warm and dry  Capillary refill takes less than 2 seconds  She is not diaphoretic  No erythema        ______________________________________________________________________  Vitals:    199 19 0000   BP: 124/67   140/73   BP Location:       Pulse: 78   97   Resp: (!) 33   (!) 32   Temp:       TempSrc:       SpO2: 92% 92% 95% 93%   Weight:       Height:                  Temperature:   Temp (24hrs), Av 9 °F (36 6 °C), Min:97 2 °F (36 2 °C), Max:98 6 °F (37 °C)    Current Temperature: 97 9 °F (36 6 °C)    Weights:   IBW: 40 9 kg    Body mass index is 31 1 kg/m²  Weight (last 2 days)     Date/Time   Weight    19 0548   67 5 (148 81)    19 0722   68 9 (151 9)              Non-Invasive/Invasive Ventilation Settings:  Respiratory    Lab Data (Last 4 hours)    None         O2/Vent Data (Last 4 hours)    None              No results found for: PHART, UTI6ZGG, PO2ART, RNT7VAI, F0BZUCJT, BEART, SOURCE  SpO2: SpO2: 93 %    Intake and Outputs:  I/O       701 -  0700 701 -  0700    P  O  890 190    I V  (mL/kg) 20 (0 3)     IV Piggyback 50     Total Intake(mL/kg) 960 (14 2) 190 (2 8)    Urine (mL/kg/hr) 1430 (0 9) 1200 (0 7)    Total Output 1430 1200    Net -470 -1010                  Nutrition:        Diet Orders   (From admission, onward)             Start     Ordered    19 0247  Diet Regular; Regular House  Diet effective now     Question Answer Comment   Diet Type Regular    Regular Regular House    RD to adjust diet per protocol?  Yes        19 0246                Labs:   Results from last 7 days   Lab Units 19  0441 19  1259   WBC Thousand/uL 9 50 10 72*   HEMOGLOBIN g/dL 13 3 14 9   HEMATOCRIT % 42 3 46 4*   PLATELETS Thousands/uL 291 340   NEUTROS PCT % 75 77*   MONOS PCT % 15* 9     Results from last 7 days   Lab Units 07/29/19  1824 07/29/19  0542 07/28/19  2043  07/27/19  0441  07/26/19  1259   SODIUM mmol/L 127* 129* 130*   < > 124*   < > 123*   POTASSIUM mmol/L 4 4 4 5 4 7   < > 4 3   < > 4 1   CHLORIDE mmol/L 89* 90* 89*   < > 86*   < > 83*   CO2 mmol/L 37* 37* 41*   < > 38*   < > 37*   BUN mg/dL 19 20 22   < > 17   < > 17   CREATININE mg/dL 0 61 0 47* 0 62   < > 0 51*   < > 0 54*   CALCIUM mg/dL 8 0* 8 4 8 1*   < > 8 0*   < > 8 8   ALK PHOS U/L  --   --   --   --  63  --  81   ALT U/L  --   --   --   --  42  --  48   AST U/L  --   --   --   --  29  --  35    < > = values in this interval not displayed  Results from last 7 days   Lab Units 07/29/19  0542 07/28/19  0500 07/27/19  0441   MAGNESIUM mg/dL 2 5 2 4 2 1     Lab Results   Component Value Date    PHOS 5 5 (H) 07/27/2019      Results from last 7 days   Lab Units 07/26/19  1259   INR  1 02   PTT seconds 29     0   Lab Value Date/Time    TROPONINI 0 07 (H) 07/26/2019 1259    TROPONINI 0 04 05/04/2018 0642    TROPONINI 0 03 05/04/2018 0337    TROPONINI 0 05 (H) 05/04/2018 0025    TROPONINI 0 04 04/30/2018 1535     Results from last 7 days   Lab Units 07/26/19  1259   LACTIC ACID mmol/L 1 3     ABG:  Lab Results   Component Value Date    PHART 7 372 07/26/2019    DHA1YGZ 65 1 (HH) 07/26/2019    PO2ART 135 2 (H) 07/26/2019    FZB9EFP 37 0 (H) 07/26/2019    BEART 9 0 07/26/2019    SOURCE Radial, Right 07/26/2019       Imaging: CXR:  I have personally reviewed pertinent reports  and I have personally reviewed pertinent films in PACS  ECHO:  I have personally reviewed pertinent films in PACS  EKG: NSR on telemetry     Micro:  Lab Results   Component Value Date    BLOODCX No Growth at 72 hrs  07/26/2019    BLOODCX No Growth at 72 hrs  07/26/2019    BLOODCX No Growth After 5 Days  04/30/2018    URINECX No Growth <1000 cfu/mL 07/27/2019    SPUTUMCULTUR Culture too young- will reincubate 04/30/2018       Allergies: Allergies   Allergen Reactions    Sulfa Antibiotics      Medications:   Scheduled Meds:    Current Facility-Administered Medications:  acetaminophen 650 mg Oral Q6H [address] Sheri Galvez, PA-C   budesonide 0 5 mg Nebulization Q12H Sheri Galvez, PA-C   citalopram 10 mg Oral Daily Sheri Galvez, PA-C   docusate sodium 100 mg Oral Daily Sheri Galvez, PA-C   enoxaparin 40 mg Subcutaneous Daily Sheri Galvez, PA-C   ipratropium-albuterol 3 mL Nebulization Q6H PRN Naz Aguilera, PA-C   levalbuterol 0 63 mg Nebulization Q8H Sheri Galvez, PA-C   levothyroxine 75 mcg Oral Daily Sheri Galvez, PA-C   melatonin 6 mg Oral HS Sheri Galvez, PA-C   oxyCODONE 2 5 mg Oral Q4H PRN Naz Aguilera, PA-C   pantoprazole 40 mg Oral Daily Sheri Galvez, PA-C   polyethylene glycol 17 g Oral Daily PRN ARNIE Aguilera   pramipexole 0 125 mg Oral TID ARNIE Aguilera   primidone 50 mg Oral Q12H [address] Sheri Galvez, PA-[place]     Continuous Infusions:   PRN Meds:    ipratropium-albuterol 3 mL Q6H PRN   oxyCODONE 2 5 mg Q4H PRN   polyethylene glycol 17 g Daily PRN       Invasive lines and devices:   Invasive Devices     Peripheral Intravenous Line            Peripheral IV 07/26/19 Left Antecubital 4 days    Peripheral IV 07/26/19 Left Wrist 3 days                   SIGNATURE: ARNIE Aguilera  DATE: July 30, 2019

## 2019-07-30 NOTE — PLAN OF CARE
Problem: OCCUPATIONAL THERAPY ADULT  Goal: Performs self-care activities at highest level of function for planned discharge setting  See evaluation for individualized goals  Outcome: Progressing  Note:   Limitation: Decreased ADL status, Decreased UE strength, Decreased Safe judgement during ADL, Decreased endurance, Decreased self-care trans, Decreased high-level ADLs  Prognosis: Good  Assessment: Patient is lethargic requiring verbal cues to complete activity  Patient requires max assist of 2 for transfers  Patient is generally weak and deconditioned  Patient would benefit from STR       OT Discharge Recommendation: Short Term Rehab

## 2019-07-30 NOTE — PROGRESS NOTES
Progress Note - Pulmonary   Noel Ruth 80 y o  female MRN: [id number]  Unit/Bed#: ICU 05 Encounter: [id number]    Assessment/Plan:    Acute on chronic hypoxic hypercapnic respiratory failure, multifactorial due to below   Titrate oxygen to maintain saturations greater than or equal to 89%  Activity as tolerated  Continue incentive spirometer  Further plan as below  Abnormal chest x-ray with pulmonary edema and bilateral pleural effusions right greater than left in the setting of underlying severe pulmonary hypertension/decompensated cor pulmonale   Diuresis per primary team with Lasix and Diamox  Monitor BMP  Given worsened chest x-ray with bilateral pleural effusions, may benefit from thoracentesis  Consider bedside ultrasound later this afternoon  Overall prognosis is poor  Restrictive lung disease due to severe thoracic scoliosis   Supportive care plan as above  Has refused BiPAP in the past      Discussed with primary team     Chief Complaint:    I do not know what's going on      Subjective: Chris Landrum is lying in bed this morning  She reports she feels short of breath, but unchanged from yesterday  She does not perceive she is coughing significantly  She denies chest pain  She reports she just overall does not feel well  Objective:    Vitals: Blood pressure 129/58, pulse (!) 106, temperature 98 2 °F (36 8 °C), temperature source Tympanic, resp  rate (!) 36, height 4' 10" (1 473 m), weight 67 5 kg (148 lb 13 oz), SpO2 93 %  13L NC,Body mass index is 31 1 kg/m²        Intake/Output Summary (Last 24 hours) at 7/30/2019 0925  Last data filed at 7/30/2019 0801  Gross per 24 hour   Intake 180 ml   Output 1300 ml   Net -1120 ml       Invasive Devices     Peripheral Intravenous Line            Peripheral IV 07/26/19 Left Antecubital 4 days    Peripheral IV 07/26/19 Left Wrist 3 days                Physical Exam:     Physical Exam   Constitutional: She is oriented to person, place, and time  She appears well-developed and well-nourished  She is cooperative  Non-toxic appearance  No distress  HENT:   Head: Normocephalic and atraumatic  Mouth/Throat: No oropharyngeal exudate  Eyes: EOM are normal  No scleral icterus  Neck: Neck supple  No JVD present  No tracheal deviation present  Cardiovascular: Normal rate, regular rhythm, S1 normal and S2 normal  Exam reveals no gallop and no friction rub  No murmur heard  Pulmonary/Chest: Effort normal  No accessory muscle usage or stridor  No tachypnea  No respiratory distress  She has decreased breath sounds in the right lower field and the left lower field  She has no wheezes  She has no rhonchi  She has no rales  She exhibits no tenderness  Abdominal: Soft  Bowel sounds are normal  She exhibits no distension  There is no tenderness  There is no rebound and no guarding  Musculoskeletal: She exhibits no edema  Neurological: She is alert and oriented to person, place, and time  She has normal strength  GCS eye subscore is 4  GCS verbal subscore is 5  GCS motor subscore is 6  Skin: Skin is warm and dry  No rash noted  She is not diaphoretic  No cyanosis  Psychiatric: Her speech is normal    Vitals reviewed      Labs:   CBC:   Lab Results   Component Value Date    WBC 8 61 07/30/2019    HGB 13 5 07/30/2019    HCT 45 5 07/30/2019     (H) 07/30/2019     07/30/2019    MCH 30 7 07/30/2019    MCHC 29 7 (L) 07/30/2019    RDW 14 0 07/30/2019    MPV 9 1 07/30/2019    NRBC 0 07/30/2019   , CMP:   Lab Results   Component Value Date    SODIUM 129 (L) 07/30/2019    K 4 5 07/30/2019    CL 89 (L) 07/30/2019    CO2 42 (H) 07/30/2019    BUN 16 07/30/2019    CREATININE 0 48 (L) 07/30/2019    CALCIUM 8 2 (L) 07/30/2019    AST 24 07/30/2019    ALT 42 07/30/2019    ALKPHOS 74 07/30/2019    EGFR 85 07/30/2019     Imaging and other studies: I have personally reviewed pertinent reports   , I have personally reviewed pertinent films in PACS and Chest x-ray today shows pulmonary vascular congestion with bilateral pleural effusions right greater than left

## 2019-07-31 NOTE — PROGRESS NOTES
Progress Note - Pulmonary   Fan Sarkar 80 y o  female MRN: [id number]  Unit/Bed#: ICU 05 Encounter: [id number]    Assessment:  Acute on chronic hypercapnic hypoxemic respiratory failure with no improvement  Patient remains on moderate amounts of oxygen with 55% Opti Flow high-flow nasal cannula oxygen  Portable chest x-ray today shows persistent bilateral pleural effusions right greater than left and pulmonary vascular congestion  Decompensated cor pulmonale with no improvement  Severe restrictive lung disease due to severe thoracic scoliosis    Plan:  Patient declined thoracentesis procedure today  She states she wants to enter hospice care  Attending doctor Michelle some will speak with family      Subjective:   Patient states she gets session breath easily  States she wants to pursue hospice option  She declined thoracentesis procedure earlier today    Objective:     Vitals: Blood pressure 152/91, pulse 79, temperature (!) 97 3 °F (36 3 °C), temperature source Temporal, resp  rate (!) 34, height 4' 10" (1 473 m), weight 67 1 kg (147 lb 14 9 oz), SpO2 92 %  ,Body mass index is 30 92 kg/m²  Intake/Output Summary (Last 24 hours) at 7/31/2019 0825  Last data filed at 7/31/2019 0516  Gross per 24 hour   Intake 480 ml   Output 1000 ml   Net -520 ml       Physical Exam: Physical Exam   Constitutional: She is oriented to person, place, and time  She appears well-developed and well-nourished  No distress  Patient is on 55% Opti Flow oxygen  O2 saturation is 93%  She is awake  Presently not in distress   HENT:   Head: Normocephalic  Nose: Nose normal    Mouth/Throat: Oropharynx is clear and moist  No oropharyngeal exudate  Eyes: Pupils are equal, round, and reactive to light  Conjunctivae are normal    Neck: Neck supple  No JVD present  No tracheal deviation present  Cardiovascular: Normal rate, regular rhythm and normal heart sounds     Pulmonary/Chest: Effort normal    Lung sounds are diminished above lower lobes  Abdominal: Soft  She exhibits no distension  There is no tenderness  There is no guarding  Musculoskeletal: She exhibits no edema  Lymphadenopathy:     She has no cervical adenopathy  Neurological: She is alert and oriented to person, place, and time  Skin: Skin is warm and dry  No rash noted  Psychiatric: She has a normal mood and affect  Her behavior is normal  Thought content normal         Labs: I have personally reviewed pertinent lab results  , ABG:   Lab Results   Component Value Date    PHART 7 372 07/26/2019    GWY5OAW 65 1 (HH) 07/26/2019    PO2ART 135 2 (H) 07/26/2019    SSZ9UTP 37 0 (H) 07/26/2019    BEART 9 0 07/26/2019    SOURCE Radial, Right 07/26/2019   , BNP: No results found for: BNP, CBC:   Lab Results   Component Value Date    WBC 8 61 07/30/2019    HGB 13 5 07/30/2019    HCT 45 5 07/30/2019     (H) 07/30/2019     07/30/2019    MCH 30 7 07/30/2019    MCHC 29 7 (L) 07/30/2019    RDW 14 0 07/30/2019    MPV 9 1 07/30/2019    NRBC 0 07/30/2019   , CMP:   Lab Results   Component Value Date    K 4 5 07/31/2019    CL 86 (L) 07/31/2019    CO2 44 (H) 07/31/2019    BUN 16 07/31/2019    CREATININE 0 51 (L) 07/31/2019    CALCIUM 8 6 07/31/2019    AST 24 07/30/2019    ALT 42 07/30/2019    ALKPHOS 74 07/30/2019    EGFR 84 07/31/2019   , PT/INR:   Lab Results   Component Value Date    INR 1 02 07/26/2019   , Troponin: No results found for: TROPONIN    Imaging and other studies: I have personally reviewed pertinent reports     and I have personally reviewed pertinent films in PACS

## 2019-07-31 NOTE — PROGRESS NOTES
Daily Progress Note - Critical Care/ Jose Galloway 80 y o  female MRN: [id number]  Unit/Bed#: ICU 05 Encounter: [id number]    ______________________________________________________________________  Assessment:   Principal Problem:    Acute on chronic respiratory failure with hypoxia (HCC)  Active Problems:    Pulmonary hypertension (HCC)    Hyponatremia    Murmur    Scoliosis    Restless leg    Hypothyroidism  Resolved Problems:    Urinary tract infection without hematuria      * Acute on chronic respiratory failure with hypoxia (HCC)  Assessment & Plan  · Still requiring HFNC with minimal improvement in oxygen needs  · Echo showed EF 55% with pulmonary hypertension with estimated PAP of 70mmHg  EF 55%  Dilated vena cava  · Pt remains on Optiflow  · Pulmonary following, appreciate input   · Discussed possible IR thoracentesis today however patient this morning adamantly not interested in procedure  Requesting hospice  Awaiting family to come in today for further discussion and possible transition to hospice    Pulmonary hypertension [facility])  Assessment & Plan  Wt Readings from Last 3 Encounters:   07/29/19 67 5 kg (148 lb 13 oz)   05/23/18 62 1 kg (137 lb)   05/16/18 62 3 kg (137 lb 6 4 oz)     · severe pulmonary hypertension with estimated PAP 70s  · Pulmonary following  · Diuresis with minimal improvement in oxygen requirements       Hyponatremia  Assessment & Plan  · Hypo-osmolar hyponatremia, serum osmo 280  · Urin osmo 351, urine sodium 39 (after receiving lasix)  · Patient without symptoms  · Continue to monitor q12  · Continue diuresis      Murmur  Assessment & Plan  · Echocardiogram demonstrates mild MR and significant pulmonary hypertension with PA P of 72    Urinary tract infection without hematuriaresolved as of 7/30/2019  Assessment & Plan  · No infection present  · Abx d/c 7/28  · Observe off of abx    Hypothyroidism  Assessment & Plan  · Continue home Synthroid dose    Restless leg  Assessment & Plan  · Home medications resumed    Scoliosis  Assessment & Plan  · Tylenol for pain  · Supportive care  · Scoliosis likely causing some pulmonary restrictive pathology   · Pulmonary hygiene      Plan:    Neuro:   · Delirium: CAM ICU positive no   · If yes, intervention:   · Pain controlled with:   · Pain score: none  · Regulate sleep/wake cycle    CV:   · Rhythm: NSR  · Follow rhythm on telemetry    Pulm:   · HFNC    GI:   · Nutrition/diet plan: regular diet  · Stress ulcer prophylaxis: No prophylaxis needed  · Bowel regimen: Colace  · Last BM: 7/26    FEN:   · Fluid/Diuretic plan: No intervention  · Goal 24 hour fluid balance: negative  · Electrolytes repleted: yes  · Goal: K >4 0, Mag >2 0, and Phos >3 0    :   · Indwelling Roberto present: no     ID:   · Trend temps and WBC count    Heme:   · Trend hgb and plts    Endo:   · Glycemic control plan: Blood glucose controlled on current regimen    Msk/Skin:  · Mobility goal:   · PT consult: yes  · OT consult: yes  · Frequent turning and off-loading    Family:  · Family updated within 24 hours: yes   · Family meeting planned today: yes     Lines:  · PIV    VTE Prophylaxis:  · Pharmacologic Prophylaxis: Enoxaparin (Lovenox)  · Mechanical Prophylaxis: sequential compression device    Disposition: Transfer to Hospice    Code Status: Level 3 - DNAR and DNI    Counseling / Coordination of Care  Total time spent today 33 minutes  Greater than 50% of total time was spent with the patient and / or family counseling and / or coordination of care  A description of the counseling / coordination of care: reviewing labs, discussing with family  ______________________________________________________________________    HPI/24hr events: no acute events overnight  Still requiring HFNC  Not interested in procedures today   Requesting hospice consult, wants to be comfortable     ______________________________________________________________________    Physical Exam: Physical Exam   Constitutional: She appears well-developed and well-nourished  No distress  HENT:   Head: Normocephalic and atraumatic  Eyes: Pupils are equal, round, and reactive to light  Neck: Neck supple  Cardiovascular: Normal rate and regular rhythm  Murmur heard  Systolic murmur is present with a grade of 2/6  Pulmonary/Chest: Effort normal and breath sounds normal  No respiratory distress  Abdominal: Soft  Bowel sounds are normal  She exhibits no distension  Musculoskeletal: She exhibits edema  Neurological: She is alert  Oriented to person, place, year, and president   Skin: Skin is warm and dry  Psychiatric: She has a normal mood and affect  Nursing note and vitals reviewed  ______________________________________________________________________  Vitals:    19 7764 19 0500 19 0555 19 0600   BP:  140/69  152/91   BP Location:       Pulse:  89  79   Resp:  (!) 27  (!) 34   Temp:       TempSrc:       SpO2: 93% 92%  91%   Weight:   67 1 kg (147 lb 14 9 oz)    Height:                  Temperature:   Temp (24hrs), Av 2 °F (36 8 °C), Min:97 8 °F (36 6 °C), Max:98 6 °F (37 °C)    Current Temperature: 97 8 °F (36 6 °C)    Weights:   IBW: 40 9 kg    Body mass index is 30 92 kg/m²  Weight (last 2 days)     Date/Time   Weight    19 0555   67 1 (147 93)    19 0600   67 5 (148 81)    19 0548   67 5 (148 81)              Hemodynamic Monitoring:  N/A       Non-Invasive/Invasive Ventilation Settings:  Respiratory    Lab Data (Last 4 hours)    None         O2/Vent Data (Last 4 hours)       045          Non-Invasive Ventilation Mode HFNC                 No results found for: PHART, ZJH5JEQ, PO2ART, IUY5PXA, A8DCYDLN, BEART, SOURCE  SpO2: SpO2: 92 %, SpO2 Activity: SpO2 Activity: At Rest, SpO2 Device: O2 Device: (optiflow), Capnography: Capnography: No    Intake and Outputs:  I/O       701 -  -  07    P  O  250 600    Total Intake(mL/kg) 250 (3 7) 600 (8 9)    Urine (mL/kg/hr) 1300 (0 8) 1000 (0 6)    Total Output 1300 1000    Net -1050 -400          Unmeasured Urine Occurrence  2 x            Nutrition:        Diet Orders   (From admission, onward)             Start     Ordered    07/30/19 0807  Room Service  Once     Question:  Type of Service  Answer:  Room Service - Appropriate with Assistance    07/30/19 0806    07/28/19 0247  Diet Regular; Regular House  Diet effective now     Question Answer Comment   Diet Type Regular    Regular Regular House    RD to adjust diet per protocol? Yes        07/28/19 0246                  Labs:   Results from last 7 days   Lab Units 07/30/19  0459 07/27/19  0441 07/26/19  1259   WBC Thousand/uL 8 61 9 50 10 72*   HEMOGLOBIN g/dL 13 5 13 3 14 9   HEMATOCRIT % 45 5 42 3 46 4*   PLATELETS Thousands/uL 280 291 340   NEUTROS PCT % 80* 75 77*   MONOS PCT % 11 15* 9     Results from last 7 days   Lab Units 07/31/19  0508 07/30/19  1551 07/30/19  0459  07/27/19  0441  07/26/19  1259   SODIUM mmol/L 129* 129* 129*   < > 124*   < > 123*   POTASSIUM mmol/L 4 5 5 3 4 5   < > 4 3   < > 4 1   CHLORIDE mmol/L 86* 88* 89*   < > 86*   < > 83*   CO2 mmol/L 44* 44* 42*   < > 38*   < > 37*   BUN mg/dL 16 17 16   < > 17   < > 17   CREATININE mg/dL 0 51* 0 45* 0 48*   < > 0 51*   < > 0 54*   CALCIUM mg/dL 8 6 8 0* 8 2*   < > 8 0*   < > 8 8   ALK PHOS U/L  --   --  74  --  63  --  81   ALT U/L  --   --  42  --  42  --  48   AST U/L  --   --  24  --  29  --  35    < > = values in this interval not displayed       Results from last 7 days   Lab Units 07/31/19  0508 07/30/19  0459 07/29/19  0542   MAGNESIUM mg/dL 2 5 2 5 2 5     Lab Results   Component Value Date    PHOS 2 7 07/31/2019    PHOS 2 6 07/30/2019    PHOS 5 5 (H) 07/27/2019      Results from last 7 days   Lab Units 07/26/19  1259   INR  1 02   PTT seconds 29     0   Lab Value Date/Time    TROPONINI 0 07 (H) 07/26/2019 1259    TROPONINI 0 04 05/04/2018 0642    TROPONINI 0 03 05/04/2018 0337    TROPONINI 0 05 (H) 05/04/2018 0025    TROPONINI 0 04 04/30/2018 1535     Results from last 7 days   Lab Units 07/26/19  1259   LACTIC ACID mmol/L 1 3     ABG:  Lab Results   Component Value Date    PHART 7 372 07/26/2019    CVL2IKB 65 1 (HH) 07/26/2019    PO2ART 135 2 (H) 07/26/2019    QVB7PJP 37 0 (H) 07/26/2019    BEART 9 0 07/26/2019    SOURCE Radial, Right 07/26/2019       Imaging:   EKG:     Micro:  Lab Results   Component Value Date    BLOODCX No Growth After 4 Days  07/26/2019    BLOODCX No Growth After 4 Days  07/26/2019    BLOODCX No Growth After 5 Days  04/30/2018    URINECX No Growth <1000 cfu/mL 07/27/2019    SPUTUMCULTUR Culture too young- will reincubate 04/30/2018       Allergies: Allergies   Allergen Reactions    Sulfa Antibiotics      Takes lasix at home       Medications:   Scheduled Meds:  Current Facility-Administered Medications:  acetaminophen 650 mg Oral Q6H [facility] Sherifrandy Galvez, PA-C   budesonide 0 5 mg Nebulization Q12H Sherifrandy Galvez, PA-C   citalopram 10 mg Oral Daily Sheri Leonor, PA-C   docusate sodium 100 mg Oral Daily Sheri Susjuan pablo, PA-C   enoxaparin 40 mg Subcutaneous Daily Sheri Leonor, PA-C   ipratropium-albuterol 3 mL Nebulization Q6H PRN Karen Bussing, PA-C   levalbuterol 0 63 mg Nebulization Q8H Sheri Leonor, PA-C   levothyroxine 75 mcg Oral Daily Sheri Leonor, PA-C   melatonin 6 mg Oral HS Sheri Leonor, PA-C   oxyCODONE 2 5 mg Oral Q4H PRN Karen Bussing, PA-C   pantoprazole 40 mg Oral Daily Sheri Leonor, PA-C   polyethylene glycol 17 g Oral Daily PRN ARNIE Mejia   pramipexole 0 125 mg Oral TID ARNIE Mejia   primidone 50 mg Oral Q12H [facility] Sheri Galvez PA-C     Continuous Infusions:   PRN Meds:    ipratropium-albuterol 3 mL Q6H PRN   oxyCODONE 2 5 mg Q4H PRN   polyethylene glycol 17 g Daily PRN       Invasive lines and devices:   Invasive Devices     Peripheral Intravenous Line            Peripheral IV 07/30/19 Left; Lower Arm less than 1 day          Drain            External Urinary Catheter less than 1 day                   SIGNATURE: Noemi Perry PA-C  DATE: July 31, 2019

## 2019-07-31 NOTE — PLAN OF CARE
Problem: RESPIRATORY - ADULT  Goal: Achieves optimal ventilation and oxygenation  Description  INTERVENTIONS:  - Assess for changes in respiratory status  - Assess for changes in mentation and behavior  - Position to facilitate oxygenation and minimize respiratory effort  - Oxygen administration by appropriate delivery method based on oxygen saturation (per order) or ABGs  - Initiate smoking cessation education as indicated  - Encourage broncho-pulmonary hygiene including cough, deep breathe, Incentive Spirometry  - Assess the need for suctioning and aspirate as needed  - Assess and instruct to report SOB or any respiratory difficulty  - Respiratory Therapy support as indicated  Outcome: Progressing     Problem: PAIN - ADULT  Goal: Verbalizes/displays adequate comfort level or baseline comfort level  Description  Interventions:  - Encourage patient to monitor pain and request assistance  - Assess pain using appropriate pain scale  - Administer analgesics based on type and severity of pain and evaluate response  - Implement non-pharmacological measures as appropriate and evaluate response  - Consider cultural and social influences on pain and pain management  - Notify physician/advanced practitioner if interventions unsuccessful or patient reports new pain  Outcome: Progressing     Problem: INFECTION - ADULT  Goal: Absence or prevention of progression during hospitalization  Description  INTERVENTIONS:  - Assess and monitor for signs and symptoms of infection  - Monitor lab/diagnostic results  - Monitor all insertion sites, i e  indwelling lines, tubes, and drains  - Monitor endotracheal (as able) and nasal secretions for changes in amount and color  - [place] appropriate cooling/warming therapies per order  - Administer medications as ordered  - Instruct and encourage patient and family to use good hand hygiene technique  - Identify and instruct in appropriate isolation precautions for identified infection/condition  Outcome: Progressing  Goal: Absence of fever/infection during neutropenic period  Description  INTERVENTIONS:  - Monitor WBC  - Implement neutropenic guidelines  Outcome: Progressing     Problem: SAFETY ADULT  Goal: Patient will remain free of falls  Description  INTERVENTIONS:  - Assess patient frequently for physical needs  -  Identify cognitive and physical deficits and behaviors that affect risk of falls    -  [place] fall precautions as indicated by assessment   - Educate patient/family on patient safety including physical limitations  - Instruct patient to call for assistance with activity based on assessment  - Modify environment to reduce risk of injury  - Consider OT/PT consult to assist with strengthening/mobility  Outcome: Progressing  Goal: Maintain or return to baseline ADL function  Description  INTERVENTIONS:  -  Assess patient's ability to carry out ADLs; assess patient's baseline for ADL function and identify physical deficits which impact ability to perform ADLs (bathing, care of mouth/teeth, toileting, grooming, dressing, etc )  - Assess/evaluate cause of self-care deficits   - Assess range of motion  - Assess patient's mobility; develop plan if impaired  - Assess patient's need for assistive devices and provide as appropriate  - Encourage maximum independence but intervene and supervise when necessary  ¯ Involve family in performance of ADLs  ¯ Assess for home care needs following discharge   ¯ Request OT consult to assist with ADL evaluation and planning for discharge  ¯ Provide patient education as appropriate  Outcome: Progressing  Goal: Maintain or return mobility status to optimal level  Description  INTERVENTIONS:  - Assess patient's baseline mobility status (ambulation, transfers, stairs, etc )    - Identify cognitive and physical deficits and behaviors that affect mobility  - Identify mobility aids required to assist with transfers and/or ambulation (gait belt, sit-to-stand, lift, walker, cane, etc )  - [place] fall precautions as indicated by assessment  - Record patient progress and toleration of activity level on Mobility SBAR; progress patient to next Phase/Stage  - Instruct patient to call for assistance with activity based on assessment  - Request Rehabilitation consult to assist with strengthening/weightbearing, etc   Outcome: Progressing     Problem: DISCHARGE PLANNING  Goal: Discharge to home or other facility with appropriate resources  Description  INTERVENTIONS:  - Identify barriers to discharge w/patient and caregiver  - Arrange for needed discharge resources and transportation as appropriate  - Identify discharge learning needs (meds, wound care, etc )  - Arrange for interpretive services to assist at discharge as needed  - Refer to Case Management Department for coordinating discharge planning if the patient needs post-hospital services based on physician/advanced practitioner order or complex needs related to functional status, cognitive ability, or social support system  Outcome: Progressing     Problem: Knowledge Deficit  Goal: Patient/family/caregiver demonstrates understanding of disease process, treatment plan, medications, and discharge instructions  Description  Complete learning assessment and assess knowledge base    Interventions:  - Provide teaching at level of understanding  - Provide teaching via preferred learning methods  Outcome: Progressing     Problem: METABOLIC, FLUID AND ELECTROLYTES - ADULT  Goal: Electrolytes maintained within normal limits  Description  INTERVENTIONS:  - Monitor labs and assess patient for signs and symptoms of electrolyte imbalances  - Administer electrolyte replacement as ordered  - Monitor response to electrolyte replacements, including repeat lab results as appropriate  - Instruct patient on fluid and nutrition as appropriate  Outcome: Progressing  Goal: Fluid balance maintained  Description  INTERVENTIONS:  - Monitor labs and assess for signs and symptoms of volume excess or deficit  - Monitor I/O and WT  - Instruct patient on fluid and nutrition as appropriate  Outcome: Progressing     Problem: Potential for Falls  Goal: Patient will remain free of falls  Description  INTERVENTIONS:  - Assess patient frequently for physical needs  -  Identify cognitive and physical deficits and behaviors that affect risk of falls  -  [place] fall precautions as indicated by assessment   - Educate patient/family on patient safety including physical limitations  - Instruct patient to call for assistance with activity based on assessment  - Modify environment to reduce risk of injury  - Consider OT/PT consult to assist with strengthening/mobility  Outcome: Progressing     Problem: Prexisting or High Potential for Compromised Skin Integrity  Goal: Skin integrity is maintained or improved  Description  INTERVENTIONS:  - Identify patients at risk for skin breakdown  - Assess and monitor skin integrity  - Assess and monitor nutrition and hydration status  - Monitor labs (i e  albumin)  - Assess for incontinence   - Turn and reposition patient  - Assist with mobility/ambulation  - Relieve pressure over bony prominences  - Avoid friction and shearing  - Provide appropriate hygiene as needed including keeping skin clean and dry  - Evaluate need for skin moisturizer/barrier cream  - Collaborate with interdisciplinary team (i e  Nutrition, Rehabilitation, etc )   - Patient/family teaching  Outcome: Progressing     Problem: Nutrition/Hydration-ADULT  Goal: Nutrient/Hydration intake appropriate for improving, restoring or maintaining nutritional needs  Description  Monitor and assess patient's nutrition/hydration status for malnutrition (ex- brittle hair, bruises, dry skin, pale skin and conjunctiva, muscle wasting, smooth red tongue, and disorientation)  Collaborate with interdisciplinary team and initiate plan and interventions as ordered  Monitor patient's weight and dietary intake as ordered or per policy  Utilize nutrition screening tool and intervene per policy  Determine patient's food preferences and provide high-protein, high-caloric foods as appropriate       INTERVENTIONS:  - Monitor oral intake, urinary output, labs, and treatment plans  - Assess nutrition and hydration status and recommend course of action  - Evaluate amount of meals eaten  - Assist patient with eating if necessary   - Allow adequate time for meals  - Recommend/ encourage appropriate diets, oral nutritional supplements, and vitamin/mineral supplements  - Order, calculate, and assess calorie counts as needed  - Recommend, monitor, and adjust tube feedings and TPN/PPN based on assessed needs  - Assess need for intravenous fluids  - Provide specific nutrition/hydration education as appropriate  - Include patient/family/caregiver in decisions related to nutrition  Outcome: Progressing

## 2019-07-31 NOTE — SPEECH THERAPY NOTE
Patient refused all po today indicating she has no desire to eat  RN reports very little intake recently  Patient is currently pending hospice evaluation  Will follow up as indicated      JESSICA Long S , [address] [facility]  Speech Language Pathologist   [phone]

## 2019-07-31 NOTE — SOCIAL WORK
Spoke with Hospice liaison [phone] who met with pt's 2 daughters and all are in agreement with hospice but would like to wait to sign consent tomorrow morning when pt's other daughter arrives  Per liaison, pt meets general inpt criteria for hospice and will meet with family tomorrow morning for consents to be signed

## 2019-08-01 PROBLEM — Z51.5 HOSPICE CARE: Status: ACTIVE | Noted: 2019-01-01

## 2019-08-01 PROBLEM — E87.1 HYPONATREMIA: Status: RESOLVED | Noted: 2019-01-01 | Resolved: 2019-01-01

## 2019-08-01 NOTE — H&P
History and Physical - Critical Care/ Sukhjinder Beckett 80 y o  female MRN: [id number]  Unit/Bed#: ICU 05 Encounter: [id number]    Reason for Admission / Chief Complaint:  Hospice    History of Present Illness:  Dana Prescott is a 80 y o  female with past medical history of GERD, hypothyroidism, depression who presented to the emergency department with worsening shortness of breath on July 26  She was initially hypoxic and required BiPAP for increased work of breathing  She was admitted to the intensive care unit  Chest x-ray and exam were consistent with volume overload  Future echocardiogram showed severe pulmonary hypertension  Patient was aggressively diuresed however had minimal improvement in her oxygenation  Over the course of several days, ongoing conversations had with the family and the patient regarding goals of care, and both the patient and family agreed to hospice  She was transitioned to hospice on 08/01      Past Medical History:  Past Medical History:   Diagnosis Date    Arthritis     Benign familial tremor     Cellulitis     Cellulitis     Chest pain     Constipation     Cough     Depression     Disease of thyroid gland     Edema     lower ext   GERD (gastroesophageal reflux disease)     Hypothyroidism     [facility]     [facility] disease ([facility] [address] )     Scoliosis     Thoracic scoliosis    Patient was diagnosed with scoliosis around age 13    Scoliosis     SOB (shortness of breath)     Tremor        Past Surgical History:  Past Surgical History:   Procedure Laterality Date    HYSTERECTOMY         Past Family History:  Family History   Problem Relation Age of Onset    Heart disease Father     Heart disease Brother        Social History:  Social History     Tobacco Use   Smoking Status Former Smoker    Packs/day: 0 25    Years: 10 00    Pack years: 2 50    Types: Cigarettes    Start date: 80    Last attempt to quit: 1955    Years since quittin 6   Smokeless Tobacco Never Used      Social History     Substance and Sexual Activity   Alcohol Use Never    Alcohol/week: 0 0 standard drinks    Frequency: Never    Comment: social     Social History     Substance and Sexual Activity   Drug Use No         Medications:  Current Facility-Administered Medications   Medication Dose Route Frequency    acetaminophen (TYLENOL) tablet 650 mg  650 mg Oral Q6H PRN    bisacodyl (DULCOLAX) rectal suppository 10 mg  10 mg Rectal Daily PRN    [START ON 2019] docusate sodium (COLACE) capsule 100 mg  100 mg Oral Daily    glycopyrrolate (ROBINUL) injection 0 2 mg  0 2 mg Intravenous Q4H PRN    ipratropium-albuterol (DUO-NEB) 0 5-2 5 mg/3 mL inhalation solution 3 mL  3 mL Nebulization Q6H PRN    LORazepam (ATIVAN) 2 mg/mL injection 0 5 mg  0 5 mg Intravenous Q1H PRN    morphine injection 0 5 mg  0 5 mg Intravenous Q3H PRN    morphine injection 0 5 mg  0 5 mg Intravenous Q4H    ondansetron (ZOFRAN) injection 4 mg  4 mg Intravenous Q6H PRN     Home medications:  Prior to Admission medications    Medication Sig Start Date End Date Taking?  Authorizing Provider   Acetaminophen (ARTHRITIS PAIN RELIEVER PO) Take 650 mg by mouth every 6 (six) hours as needed    Historical Provider, MD   albuterol (ACCUNEB) 1 25 MG/3ML nebulizer solution Take 1 ampule by nebulization every 6 (six) hours as needed for wheezing    Historical Provider, MD   Ascorbic Acid (VITAMIN C) 100 MG tablet Take 100 mg by mouth daily    Historical Provider, MD   benzonatate (TESSALON PERLES) 100 mg capsule Take 100 mg by mouth 3 (three) times a day as needed for cough    Historical Provider, MD   Calcium Carb-Cholecalciferol (CALCIUM+D3) 600-800 MG-UNIT TABS Take by mouth 2 (two) times a day    Historical Provider, MD   citalopram (CeleXA) 10 mg tablet Take 10 mg by mouth daily    Historical Provider, MD   docusate sodium (COLACE) 100 mg capsule Take 100 mg by mouth daily    Historical Provider, MD   fluticasone (FLONASE) 50 mcg/act nasal spray 1 spray into each nostril daily    Historical Provider, MD   furosemide (LASIX) 40 mg tablet Take 40 mg by mouth 2 (two) times a day    Historical Provider, MD   ipratropium (ATROVENT) 0 02 % nebulizer solution Take 0 5 mg by nebulization every 6 (six) hours as needed for wheezing or shortness of breath    Historical Provider, MD   levothyroxine 75 mcg tablet Take 75 mcg by mouth daily    Historical Provider, MD   Liniments (SALONPAS PAIN RELIEF PATCH EX) Apply topically daily as needed    Historical Provider, MD   nitroglycerin (NITROSTAT) 0 4 mg SL tablet Place 0 4 mg under the tongue every 5 (five) minutes as needed for chest pain    Historical Provider, MD   pantoprazole (PROTONIX) 40 mg tablet Take 40 mg by mouth daily    Historical Provider, MD   pramipexole (MIRAPEX) 0 125 mg tablet Take 0 125 mg by mouth 3 (three) times a day    Historical Provider, MD   primidone (MYSOLINE) 50 mg tablet Take by mouth 2 (two) times a day    Historical Provider, MD   Probiotic Product ([address]) CAPS Take by mouth    Historical Provider, MD   traMADol (ULTRAM) 50 mg tablet Take 50 mg by mouth 3 (three) times a day    Historical Provider, MD     Allergies: Allergies   Allergen Reactions    Sulfa Antibiotics      Takes lasix at home         ROS:   Review of Systems   Unable to perform ROS: Mental status change       Vitals:  Vitals:    19 1130   BP: 119/60   BP Location: Right arm   Pulse: 83   Resp: 22   Temp: 97 6 °F (36 4 °C)   TempSrc: Temporal   SpO2: (!) 84%     Temperature:   Temp (24hrs), Av 7 °F (36 5 °C), Min:97 1 °F (36 2 °C), Max:98 1 °F (36 7 °C)    Current Temperature: 97 6 °F (36 4 °C)    Weights: There is no height or weight on file to calculate BMI      Hemodynamic Monitoring:  N/A     Non-Invasive/Invasive Ventilation Settings:  Respiratory    Lab Data (Last 4 hours)    None         O2/Vent Data (Last 4 hours)    None Physical Exam:  Physical Exam   Constitutional: She is oriented to person, place, and time  She appears well-developed and well-nourished  No distress  HENT:   Head: Normocephalic  Cardiovascular: Normal rate and regular rhythm  No murmur heard  Pulmonary/Chest: Effort normal and breath sounds normal  No respiratory distress  Abdominal: Soft  Bowel sounds are normal  She exhibits no distension  Musculoskeletal: She exhibits no edema  Neurological: She is alert and oriented to person, place, and time  Labs:  Results from last 7 days   Lab Units 07/30/19  0459 07/27/19  0441 07/26/19  1259   WBC Thousand/uL 8 61 9 50 10 72*   HEMOGLOBIN g/dL 13 5 13 3 14 9   HEMATOCRIT % 45 5 42 3 46 4*   PLATELETS Thousands/uL 280 291 340   NEUTROS PCT % 80* 75 77*   MONOS PCT % 11 15* 9      Results from last 7 days   Lab Units 07/31/19  0508 07/30/19  1551 07/30/19  0459  07/27/19  0441  07/26/19  1259   SODIUM mmol/L 129* 129* 129*   < > 124*   < > 123*   POTASSIUM mmol/L 4 5 5 3 4 5   < > 4 3   < > 4 1   CHLORIDE mmol/L 86* 88* 89*   < > 86*   < > 83*   CO2 mmol/L 44* 44* 42*   < > 38*   < > 37*   BUN mg/dL 16 17 16   < > 17   < > 17   CREATININE mg/dL 0 51* 0 45* 0 48*   < > 0 51*   < > 0 54*   CALCIUM mg/dL 8 6 8 0* 8 2*   < > 8 0*   < > 8 8   ALK PHOS U/L  --   --  74  --  63  --  81   ALT U/L  --   --  42  --  42  --  48   AST U/L  --   --  24  --  29  --  35    < > = values in this interval not displayed       Results from last 7 days   Lab Units 07/31/19  0508 07/30/19  0459 07/29/19  0542   MAGNESIUM mg/dL 2 5 2 5 2 5     Results from last 7 days   Lab Units 07/31/19  0508 07/30/19  0459 07/27/19  0441   PHOSPHORUS mg/dL 2 7 2 6 5 5*      Results from last 7 days   Lab Units 07/26/19  1259   INR  1 02   PTT seconds 29     Results from last 7 days   Lab Units 07/26/19  1259   LACTIC ACID mmol/L 1 3     0   Lab Value Date/Time    TROPONINI 0 07 (H) 07/26/2019 1259    TROPONINI 0 04 05/04/2018 0064    TROPONINI 0 03 05/04/2018 0337    TROPONINI 0 05 (H) 05/04/2018 0025    TROPONINI 0 04 04/30/2018 1535           Micro:  Blood Culture:   Lab Results   Component Value Date    BLOODCX No Growth After 5 Days  07/26/2019    BLOODCX No Growth After 5 Days  07/26/2019    BLOODCX No Growth After 5 Days  04/30/2018    BLOODCX No Growth After 5 Days  04/30/2018     Urine Culture:   Lab Results   Component Value Date    URINECX No Growth <1000 cfu/mL 07/27/2019     Sputum Culture: No components found for: SPUTUMCX  Wound Culure: No results found for: Jany Adan  ______________________________________________________________________    Assessment:   Active Problems:    Hospice care  Resolved Problems:    * No resolved hospital problems  *      Hospice care  Assessment & Plan  · Patient was severe pulmonary hypertension and CHF  · Unresponsive to aggressive diuresis with persistent high oxygen requirements  · Patient and family agreeable to transition to hospice  · Morphine scheduled and as needed  · Transition to regular nasal cannula    Family:  · Family updated: yes     Disposition: Admit to med/surg hospice      ______________________________________________________________________        Invasive lines and devices: Invasive Devices     Peripheral Intravenous Line            Peripheral IV 07/30/19 Left; Lower Arm 2 days                Code Status: Level 4 - Comfort Care  POA:    POLST:      Given critical illness, patient length of stay will require greater than two midnights      Signature: Raudel Shore PA-C  Date: 8/1/2019

## 2019-08-01 NOTE — ASSESSMENT & PLAN NOTE
· Patient was severe pulmonary hypertension and CHF  · Unresponsive to aggressive diuresis with persistent high oxygen requirements  · Patient and family agreeable to transition to hospice  · Morphine scheduled and as needed  · Transition to regular nasal cannula

## 2019-08-01 NOTE — PLAN OF CARE
Pt transitioned to [address]  Discontinue current care plan         Problem: RESPIRATORY - ADULT  Goal: Achieves optimal ventilation and oxygenation  Description  INTERVENTIONS:  - Assess for changes in respiratory status  - Assess for changes in mentation and behavior  - Position to facilitate oxygenation and minimize respiratory effort  - Oxygen administration by appropriate delivery method based on oxygen saturation (per order) or ABGs  - Initiate smoking cessation education as indicated  - Encourage broncho-pulmonary hygiene including cough, deep breathe, Incentive Spirometry  - Assess the need for suctioning and aspirate as needed  - Assess and instruct to report SOB or any respiratory difficulty  - Respiratory Therapy support as indicated  8/1/2019 1115 by Francois Griggs RN  Outcome: Completed  8/1/2019 1001 by Francois Griggs RN  Outcome: Progressing     Problem: PAIN - ADULT  Goal: Verbalizes/displays adequate comfort level or baseline comfort level  Description  Interventions:  - Encourage patient to monitor pain and request assistance  - Assess pain using appropriate pain scale  - Administer analgesics based on type and severity of pain and evaluate response  - Implement non-pharmacological measures as appropriate and evaluate response  - Consider cultural and social influences on pain and pain management  - Notify physician/advanced practitioner if interventions unsuccessful or patient reports new pain  8/1/2019 1115 by Francois Griggs RN  Outcome: Completed  8/1/2019 1001 by Francois Griggs RN  Outcome: Progressing     Problem: INFECTION - ADULT  Goal: Absence or prevention of progression during hospitalization  Description  INTERVENTIONS:  - Assess and monitor for signs and symptoms of infection  - Monitor lab/diagnostic results  - Monitor all insertion sites, i e  indwelling lines, tubes, and drains  - Monitor endotracheal (as able) and nasal secretions for changes in amount and color  - [place] appropriate cooling/warming therapies per order  - Administer medications as ordered  - Instruct and encourage patient and family to use good hand hygiene technique  - Identify and instruct in appropriate isolation precautions for identified infection/condition  8/1/2019 1115 by Юлия Burciaga RN  Outcome: Completed  8/1/2019 1001 by Юлия Burciaga RN  Outcome: Progressing  Goal: Absence of fever/infection during neutropenic period  Description  INTERVENTIONS:  - Monitor WBC  - Implement neutropenic guidelines  8/1/2019 1115 by Юлия Burciaga RN  Outcome: Completed  8/1/2019 1001 by Юлия Burciaga RN  Outcome: Progressing     Problem: SAFETY ADULT  Goal: Patient will remain free of falls  Description  INTERVENTIONS:  - Assess patient frequently for physical needs  -  Identify cognitive and physical deficits and behaviors that affect risk of falls    -  [place] fall precautions as indicated by assessment   - Educate patient/family on patient safety including physical limitations  - Instruct patient to call for assistance with activity based on assessment  - Modify environment to reduce risk of injury  - Consider OT/PT consult to assist with strengthening/mobility  8/1/2019 1115 by Юлия Burciaga RN  Outcome: Completed  8/1/2019 1001 by Юлия Burciaga RN  Outcome: Progressing  Goal: Maintain or return to baseline ADL function  Description  INTERVENTIONS:  -  Assess patient's ability to carry out ADLs; assess patient's baseline for ADL function and identify physical deficits which impact ability to perform ADLs (bathing, care of mouth/teeth, toileting, grooming, dressing, etc )  - Assess/evaluate cause of self-care deficits   - Assess range of motion  - Assess patient's mobility; develop plan if impaired  - Assess patient's need for assistive devices and provide as appropriate  - Encourage maximum independence but intervene and supervise when necessary  ¯ Involve family in performance of ADLs  ¯ Assess for home care needs following discharge   ¯ Request OT consult to assist with ADL evaluation and planning for discharge  ¯ Provide patient education as appropriate  8/1/2019 1115 by Jessi Silva RN  Outcome: Completed  8/1/2019 1001 by Jessi Silva RN  Outcome: Progressing  Goal: Maintain or return mobility status to optimal level  Description  INTERVENTIONS:  - Assess patient's baseline mobility status (ambulation, transfers, stairs, etc )    - Identify cognitive and physical deficits and behaviors that affect mobility  - Identify mobility aids required to assist with transfers and/or ambulation (gait belt, sit-to-stand, lift, walker, cane, etc )  - [place] fall precautions as indicated by assessment  - Record patient progress and toleration of activity level on Mobility SBAR; progress patient to next Phase/Stage  - Instruct patient to call for assistance with activity based on assessment  - Request Rehabilitation consult to assist with strengthening/weightbearing, etc   8/1/2019 1115 by Jessi Silva RN  Outcome: Completed  8/1/2019 1001 by Jessi Silva RN  Outcome: Progressing     Problem: DISCHARGE PLANNING  Goal: Discharge to home or other facility with appropriate resources  Description  INTERVENTIONS:  - Identify barriers to discharge w/patient and caregiver  - Arrange for needed discharge resources and transportation as appropriate  - Identify discharge learning needs (meds, wound care, etc )  - Arrange for interpretive services to assist at discharge as needed  - Refer to Case Management Department for coordinating discharge planning if the patient needs post-hospital services based on physician/advanced practitioner order or complex needs related to functional status, cognitive ability, or social support system  8/1/2019 1115 by Jessi Silva RN  Outcome: Completed  8/1/2019 1001 by Jessi Silva RN  Outcome: Progressing     Problem: Knowledge Deficit  Goal: Patient/family/caregiver demonstrates understanding of disease process, treatment plan, medications, and discharge instructions  Description  Complete learning assessment and assess knowledge base  Interventions:  - Provide teaching at level of understanding  - Provide teaching via preferred learning methods  8/1/2019 1115 by Elio Castanon RN  Outcome: Completed  8/1/2019 1001 by Elio Castanon RN  Outcome: Progressing     Problem: METABOLIC, FLUID AND ELECTROLYTES - ADULT  Goal: Electrolytes maintained within normal limits  Description  INTERVENTIONS:  - Monitor labs and assess patient for signs and symptoms of electrolyte imbalances  - Administer electrolyte replacement as ordered  - Monitor response to electrolyte replacements, including repeat lab results as appropriate  - Instruct patient on fluid and nutrition as appropriate  8/1/2019 1115 by Elio Castanon RN  Outcome: Completed  8/1/2019 1001 by Elio Castanon RN  Outcome: Progressing  Goal: Fluid balance maintained  Description  INTERVENTIONS:  - Monitor labs and assess for signs and symptoms of volume excess or deficit  - Monitor I/O and WT  - Instruct patient on fluid and nutrition as appropriate  8/1/2019 1115 by Elio Castanon RN  Outcome: Completed  8/1/2019 1001 by Elio Castanon RN  Outcome: Progressing     Problem: Potential for Falls  Goal: Patient will remain free of falls  Description  INTERVENTIONS:  - Assess patient frequently for physical needs  -  Identify cognitive and physical deficits and behaviors that affect risk of falls    -  [place] fall precautions as indicated by assessment   - Educate patient/family on patient safety including physical limitations  - Instruct patient to call for assistance with activity based on assessment  - Modify environment to reduce risk of injury  - Consider OT/PT consult to assist with strengthening/mobility  8/1/2019 1115 by Elio Castanon RN  Outcome: Completed  8/1/2019 1001 by Elio Castanon RN  Outcome: Progressing     Problem: Prexisting or High Potential for Compromised Skin Integrity  Goal: Skin integrity is maintained or improved  Description  INTERVENTIONS:  - Identify patients at risk for skin breakdown  - Assess and monitor skin integrity  - Assess and monitor nutrition and hydration status  - Monitor labs (i e  albumin)  - Assess for incontinence   - Turn and reposition patient  - Assist with mobility/ambulation  - Relieve pressure over bony prominences  - Avoid friction and shearing  - Provide appropriate hygiene as needed including keeping skin clean and dry  - Evaluate need for skin moisturizer/barrier cream  - Collaborate with interdisciplinary team (i e  Nutrition, Rehabilitation, etc )   - Patient/family teaching  8/1/2019 1115 by Erin Noguera RN  Outcome: Completed  8/1/2019 1001 by Erin Noguera RN  Outcome: Progressing     Problem: Nutrition/Hydration-ADULT  Goal: Nutrient/Hydration intake appropriate for improving, restoring or maintaining nutritional needs  Description  Monitor and assess patient's nutrition/hydration status for malnutrition (ex- brittle hair, bruises, dry skin, pale skin and conjunctiva, muscle wasting, smooth red tongue, and disorientation)  Collaborate with interdisciplinary team and initiate plan and interventions as ordered  Monitor patient's weight and dietary intake as ordered or per policy  Utilize nutrition screening tool and intervene per policy  Determine patient's food preferences and provide high-protein, high-caloric foods as appropriate       INTERVENTIONS:  - Monitor oral intake, urinary output, labs, and treatment plans  - Assess nutrition and hydration status and recommend course of action  - Evaluate amount of meals eaten  - Assist patient with eating if necessary   - Allow adequate time for meals  - Recommend/ encourage appropriate diets, oral nutritional supplements, and vitamin/mineral supplements  - Order, calculate, and assess calorie counts as needed  - Recommend, monitor, and adjust tube feedings and TPN/PPN based on assessed needs  - Assess need for intravenous fluids  - Provide specific nutrition/hydration education as appropriate  - Include patient/family/caregiver in decisions related to nutrition  8/1/2019 1115 by Jessi Silva RN  Outcome: Completed  8/1/2019 1001 by Jessi Silva, RN  Outcome: Progressing

## 2019-08-01 NOTE — PROGRESS NOTES
Daily Progress Note - Critical Care/ Anabelle Bueno 80 y o  female MRN: [id number]  Unit/Bed#: ICU 05 Encounter: [id number]    ______________________________________________________________________  Assessment:   Principal Problem:    Acute on chronic respiratory failure with hypoxia (HCC)  Active Problems:    Pulmonary hypertension (HCC)    Hyponatremia    Murmur    Scoliosis    Restless leg    Hypothyroidism  Resolved Problems:    Urinary tract infection without hematuria      * Acute on chronic respiratory failure with hypoxia (HCC)  Assessment & Plan  · Still requiring HFNC with minimal improvement in oxygen needs  · Echo showed EF 55% with pulmonary hypertension with estimated PAP of 70mmHg  EF 55%  Dilated vena cava  · After further discussion with family yesterday, they are in agreement with transitioning to hospice however they are waiting for another family member  Patient herself also is in agreement with hospice  Pulmonary hypertension (HCC)  Assessment & Plan  Wt Readings from Last 3 Encounters:   07/29/19 67 5 kg (148 lb 13 oz)   05/23/18 62 1 kg (137 lb)   05/16/18 62 3 kg (137 lb 6 4 oz)     · severe pulmonary hypertension with estimated PAP 70s  · Pulmonary following  · Diuresis with minimal improvement in oxygen requirements       Hyponatremiaresolved as of 8/1/2019  Assessment & Plan  · Hypo-osmolar hyponatremia, serum osmo 280  · Urin osmo 351, urine sodium 39 (after receiving lasix)  · Patient without symptoms  · Continue to monitor q12  · Continue diuresis      Murmur  Assessment & Plan  · Echocardiogram demonstrates mild MR and significant pulmonary hypertension with PA P of 72    Urinary tract infection without hematuriaresolved as of 7/30/2019  Assessment & Plan  · No infection present  · Abx d/c 7/28  · Observe off of abx    Hypothyroidism  Assessment & Plan  · Continue home Synthroid dose    Restless leg  Assessment & Plan  · Home medications resumed    Scoliosis  Assessment & Plan  · Tylenol for pain  · Supportive care  · Scoliosis likely causing some pulmonary restrictive pathology   · Pulmonary hygiene    Disposition: Transfer to Hospice    Code Status: Level 3 - DNAR and DNI    Counseling / Coordination of Care    ______________________________________________________________________    HPI/24hr events: States she is very tired  Wants to proceed with hospice and wants to be comfortable     ______________________________________________________________________    Physical Exam:   Physical Exam   Constitutional: She is oriented to person, place, and time  She appears well-developed and well-nourished  No distress  HENT:   Head: Normocephalic and atraumatic  Eyes: Pupils are equal, round, and reactive to light  Cardiovascular: Normal rate and regular rhythm  No murmur heard  Pulmonary/Chest: Breath sounds normal  She is in respiratory distress  Abdominal: Soft  Bowel sounds are normal  She exhibits no distension  Musculoskeletal: She exhibits edema  Neurological: She is alert and oriented to person, place, and time  Skin: Skin is warm and dry  Nursing note and vitals reviewed  ______________________________________________________________________  Vitals:    19 0000 19 0200 19 0400 19 0420   BP: 130/69 132/75 138/94    BP Location:       Pulse: 74 83 95    Resp: 22 (!) 10 (!) 27    Temp: 97 5 °F (36 4 °C)  97 8 °F (36 6 °C)    TempSrc: Temporal  Temporal    SpO2: 96% 96% 94% 95%   Weight:       Height:                  Temperature:   Temp (24hrs), Av 8 °F (36 6 °C), Min:97 3 °F (36 3 °C), Max:98 1 °F (36 7 °C)    Current Temperature: 97 8 °F (36 6 °C)    Weights:   IBW: 40 9 kg    Body mass index is 30 92 kg/m²    Weight (last 2 days)     Date/Time   Weight    19 0555   67 1 (147 93)    19 0600   67 5 (148 81)              Hemodynamic Monitoring:  N/A       Non-Invasive/Invasive Ventilation Settings:  Respiratory    Lab Data (Last 4 hours)    None         O2/Vent Data (Last 4 hours)      08/01 0420          Non-Invasive Ventilation Mode HFNC                 No results found for: PHART, GTJ4VKH, PO2ART, CQW2PYM, K1ABBWFU, BEART, SOURCE  SpO2: SpO2: 94 %, SpO2 Activity: SpO2 Activity: At Rest    Intake and Outputs:  I/O       07/30 0701 - 07/31 0700 07/31 0701 - 08/01 0700    P  O  600 270    Total Intake(mL/kg) 600 (8 9) 270 (4)    Urine (mL/kg/hr) 1000 (0 6)     Total Output 1000     Net -400 +270          Unmeasured Urine Occurrence 2 x 4 x            Nutrition:        Diet Orders   (From admission, onward)             Start     Ordered    07/30/19 0807  Room Service  Once     Question:  Type of Service  Answer:  Room Service - Appropriate with Assistance    07/30/19 0806    07/28/19 0247  Diet Regular; Regular House  Diet effective now     Question Answer Comment   Diet Type Regular    Regular Regular House    RD to adjust diet per protocol? Yes        07/28/19 0246                  Labs:   Results from last 7 days   Lab Units 07/30/19  0459 07/27/19  0441 07/26/19  1259   WBC Thousand/uL 8 61 9 50 10 72*   HEMOGLOBIN g/dL 13 5 13 3 14 9   HEMATOCRIT % 45 5 42 3 46 4*   PLATELETS Thousands/uL 280 291 340   NEUTROS PCT % 80* 75 77*   MONOS PCT % 11 15* 9     Results from last 7 days   Lab Units 07/31/19  0508 07/30/19  1551 07/30/19  0459  07/27/19  0441  07/26/19  1259   SODIUM mmol/L 129* 129* 129*   < > 124*   < > 123*   POTASSIUM mmol/L 4 5 5 3 4 5   < > 4 3   < > 4 1   CHLORIDE mmol/L 86* 88* 89*   < > 86*   < > 83*   CO2 mmol/L 44* 44* 42*   < > 38*   < > 37*   BUN mg/dL 16 17 16   < > 17   < > 17   CREATININE mg/dL 0 51* 0 45* 0 48*   < > 0 51*   < > 0 54*   CALCIUM mg/dL 8 6 8 0* 8 2*   < > 8 0*   < > 8 8   ALK PHOS U/L  --   --  74  --  63  --  81   ALT U/L  --   --  42  --  42  --  48   AST U/L  --   --  24  --  29  --  35    < > = values in this interval not displayed       Results from last 7 days   Lab Units 07/31/19  0508 07/30/19  0459 07/29/19  0542   MAGNESIUM mg/dL 2 5 2 5 2 5     Lab Results   Component Value Date    PHOS 2 7 07/31/2019    PHOS 2 6 07/30/2019    PHOS 5 5 (H) 07/27/2019      Results from last 7 days   Lab Units 07/26/19  1259   INR  1 02   PTT seconds 29     0   Lab Value Date/Time    TROPONINI 0 07 (H) 07/26/2019 1259    TROPONINI 0 04 05/04/2018 0642    TROPONINI 0 03 05/04/2018 0337    TROPONINI 0 05 (H) 05/04/2018 0025    TROPONINI 0 04 04/30/2018 1535     Results from last 7 days   Lab Units 07/26/19  1259   LACTIC ACID mmol/L 1 3     ABG:  Lab Results   Component Value Date    PHART 7 372 07/26/2019    ZMX1QTG 65 1 (HH) 07/26/2019    PO2ART 135 2 (H) 07/26/2019    ORJ4MCT 37 0 (H) 07/26/2019    BEART 9 0 07/26/2019    SOURCE Radial, Right 07/26/2019       Imaging:   EKG: nsr    Micro:  Lab Results   Component Value Date    BLOODCX No Growth After 5 Days  07/26/2019    BLOODCX No Growth After 5 Days  07/26/2019    BLOODCX No Growth After 5 Days  04/30/2018    URINECX No Growth <1000 cfu/mL 07/27/2019    SPUTUMCULTUR Culture too young- will reincubate 04/30/2018       Allergies:    Allergies   Allergen Reactions    Sulfa Antibiotics      Takes lasix at home       Medications:   Scheduled Meds:  Current Facility-Administered Medications:  acetaminophen 650 mg Oral Q6H [address] Sheri Galvez, PA-[place]   acetylcysteine 3 mL Nebulization Q8H ARNIE Lopez   budesonide 0 5 mg Nebulization Q12H Sheri Galvez PA-C   citalopram 10 mg Oral Daily Sheri Galvez, PA-[place]   docusate sodium 100 mg Oral Daily Sheri Galvez, PA-[place]   enoxaparin 40 mg Subcutaneous Daily Sheri Galvez, PA-C   ipratropium-albuterol 3 mL Nebulization Q6H PRN Lissy Escort, PA-C   levalbuterol 0 63 mg Nebulization Q8H Sheri Galvez PA-C   levothyroxine 75 mcg Oral Daily Sheri Galvez PA-C   melatonin 6 mg Oral HS Lissy Rothman PA-C   morphine injection 2 mg Intravenous Q4H PRN Svitlana Menard PA-C   pantoprazole 40 mg Oral Daily Sheri Galvez PA-C   polyethylene glycol 17 g Oral Daily PRN ARNIE Mejia   pramipexole 0 125 mg Oral TID ARNIE Mejia   primidone 50 mg Oral Q12H [facility] Sheri Galvez PA-C     Continuous Infusions:   PRN Meds:    ipratropium-albuterol 3 mL Q6H PRN   morphine injection 2 mg Q4H PRN   polyethylene glycol 17 g Daily PRN       Invasive lines and devices: Invasive Devices     Peripheral Intravenous Line            Peripheral IV 07/30/19 Left; Lower Arm 1 day                   SIGNATURE: Svitlana Menard PA-C  DATE: August 1, 2019

## 2019-08-01 NOTE — DISCHARGE SUMMARY
Critical Care Discharge Death Summary   Tanner Holley 80 y o  female MRN: [id number]  [place] [id number]   Unit/Bed#: ICU 05 Encounter: [id number]    [address] [place] [address]    Admitting Provider: Escobar Medrano MD  Discharge Provider: Escobar Medrano MD    Admission Date: 2019       Date of Death: 2019    Primary Discharge Diagnosis  Principal Problem:    Hospice care  Active Problems:    Acute on chronic respiratory failure with hypoxia ([address] )    Pulmonary hypertension ([address] )  Resolved Problems:    * No resolved hospital problems  *      Consulting Providers   · Pulmonary    Therapeutic Operative Procedures Performed  · None    Diagnostic Procedures Performed  Labs, radiology, microbiology- see EMR for complete results    Hospital Course  80 y o  female with past medical history of GERD, hypothyroidism, depression who presented to the emergency department with worsening shortness of breath on   She was initially hypoxic and required BiPAP for increased work of breathing  She was admitted to the intensive care unit  Chest x-ray and exam were consistent with volume overload  Future echocardiogram showed severe pulmonary hypertension  Patient was aggressively diuresed however had minimal improvement in her oxygenation  Over the course of several days, ongoing conversations had with the family and the patient regarding goals of care, and both the patient and family agreed to hospice  She was transitioned to hospice on    She was pronounced dead at [address] [facility]    Date, Time and Cause of Death    Date of Death:  19  Time of Death:   5:58 PM  Preliminary Cause of Death:  Acute respiratory failure with hypoxia  Entered by:  Jayashree GAITAN[DT1 1]     Attribution     DT1 1 ARNIE Ramírez 19 18:18          Code Status: Level 4 - Comfort Care    Discharge Condition:     Signature: ARNIE Ramírez  Date: 2019

## 2019-08-01 NOTE — PROGRESS NOTES
Haley Cullen 80 y o  female MRN: [id number]  [address]   Unit/Bed#: ICU 05 Encounter: [id number]    Death Pronouncement Note    Called to bedside by RN  Patient is identified visually and identification confirmed with hospital identification bracelet  No spontaneous movements were present  There was no response to verbal or tactile stimuli  No spontaneous respirations present  No breath sounds were appreciated over bilateral lung fields  No heart sounds were auscultated across the precordium  Asystolic on two contiguous leads  Time of death: 8400  Family was notified yes  Family member notified: Daughters were present at bedside  Attending physician, Dr Eliezer Evans, notified       ARNIE Warren  August 1, 2019

## 2019-08-01 NOTE — SPEECH THERAPY NOTE
Speech Language/Pathology    Speech/Language Pathology Progress Note    Patient Name: Toribio GRACIAW Date: 8/1/2019         Subjective:  Pt seen for dysphagia tx follow up  Pt to be transitioned to hospice later today  Pt on HFNC, RR in 30's, family member at bedside  Pt agreeable to eating some fresh fruit  Objective:  Pt fed small pcs of fresh fruit-cantaloupe, strawberries, honeydew melon and sips of apple juice by straw  Pt w/ prolonged mastication of fruit, took sips of juice to aid w/ clearance of fruit residue pcs  Pt w/ varying attention/CHIKI during session-appeared to become more fatigued during session  Needed occas verbal/tactile stim to maintain alertness and clear oral cavity  Pt appeared w/ adequate oral control of thin liquids, only took 1-2 sips at a time  [place] complete w/ no overt s/s aspiration, however pt appeared fatigued and needed rest breaks  Assessment:  Pt w/ increased mastication of solid foods, which fatigues pt, would choose softer foods, feed slowly and small amts at a time  No overt s/s aspiration w/ single sips of thin liquids  Plan/Recommendations: Will sign off, pt to be transitioned to hospice later today       April Dinora Hayes MA CCC-SLP  Speech Patholgist  [place] license # [address] [facility] [id number]  [facility] [place] license # [id number]  Available via [email or web address]

## 2019-08-01 NOTE — RESPIRATORY THERAPY NOTE
RT Protocol Note  Padma Rebollar 80 y o  female MRN: [id number]  Unit/Bed#: ICU 05 Encounter: [id number]    Assessment    Principal Problem:    Hospice care      Home Pulmonary Medications:  neb    Past Medical History:   Diagnosis Date    Arthritis     Benign familial tremor     Cellulitis     Cellulitis     Chest pain     Constipation     Cough     Depression     Disease of thyroid gland     Edema     lower ext   GERD (gastroesophageal reflux disease)     Hypothyroidism     [facility] disease ([facility]     Parkinson disease ([facility] [address] )     Scoliosis     Thoracic scoliosis  Patient was diagnosed with scoliosis around age 13    Scoliosis     SOB (shortness of breath)     Tremor      Social History     Socioeconomic History    Marital status:       Spouse name: Not on file    Number of children: Not on file    Years of education: Not on file    Highest education level: Not on file   Occupational History    Not on file   Social Needs    Financial resource strain: Not on file    Food insecurity:     Worry: Not on file     Inability: Not on file    Transportation needs:     Medical: Not on file     Non-medical: Not on file   Tobacco Use    Smoking status: Former Smoker     Packs/day: 0 25     Years: 10 00     Pack years: 2 50     Types: Cigarettes     Start date:      Last attempt to quit:      Years since quittin 6    Smokeless tobacco: Never Used   Substance and Sexual Activity    Alcohol use: Never     Alcohol/week: 0 0 standard drinks     Frequency: Never     Comment: social    Drug use: No    Sexual activity: Not Currently   Lifestyle    Physical activity:     Days per week: Not on file     Minutes per session: Not on file    Stress: Not on file   Relationships    Social connections:     Talks on phone: Not on file     Gets together: Not on file     Attends Christian service: Not on file     Active member of club or organization: Not on file     Attends meetings of clubs or organizations: Not on file     Relationship status: Not on file    Intimate partner violence:     Fear of current or ex partner: Not on file     Emotionally abused: Not on file     Physically abused: Not on file     Forced sexual activity: Not on file   Other Topics Concern    Not on file   Social History Narrative    Not on file       Subjective         Objective    Physical Exam:        Vitals:  Blood pressure 124/65, pulse 102, temperature 97 6 °F (36 4 °C), temperature source Temporal, resp  rate (!) 25, height 4' 10" (1 473 m), weight 66 7 kg (147 lb), SpO2 93 %  Results from last 7 days   Lab Units 07/26/19  1943   PH ART  7 372   PCO2 ART mm Hg 65 1*   PO2 ART mm Hg 135 2*   HCO3 ART mmol/L 37 0*   BASE EXC ART mmol/L 9 0   O2 CONTENT ART mL/dL 20 5   O2 HGB, ARTERIAL % 97 0   ABG SOURCE  Radial, Right   WANDER TEST  Yes       Imaging and other studies: I have personally reviewed pertinent reports              Plan

## 2019-08-01 NOTE — PLAN OF CARE
Problem: RESPIRATORY - ADULT  Goal: Achieves optimal ventilation and oxygenation  Description  INTERVENTIONS:  - Assess for changes in respiratory status  - Assess for changes in mentation and behavior  - Position to facilitate oxygenation and minimize respiratory effort  - Oxygen administration by appropriate delivery method based on oxygen saturation (per order) or ABGs  - Initiate smoking cessation education as indicated  - Encourage broncho-pulmonary hygiene including cough, deep breathe, Incentive Spirometry  - Assess the need for suctioning and aspirate as needed  - Assess and instruct to report SOB or any respiratory difficulty  - Respiratory Therapy support as indicated  Outcome: Progressing     Problem: PAIN - ADULT  Goal: Verbalizes/displays adequate comfort level or baseline comfort level  Description  Interventions:  - Encourage patient to monitor pain and request assistance  - Assess pain using appropriate pain scale  - Administer analgesics based on type and severity of pain and evaluate response  - Implement non-pharmacological measures as appropriate and evaluate response  - Consider cultural and social influences on pain and pain management  - Notify physician/advanced practitioner if interventions unsuccessful or patient reports new pain  Outcome: Progressing     Problem: INFECTION - ADULT  Goal: Absence or prevention of progression during hospitalization  Description  INTERVENTIONS:  - Assess and monitor for signs and symptoms of infection  - Monitor lab/diagnostic results  - Monitor all insertion sites, i e  indwelling lines, tubes, and drains  - Monitor endotracheal (as able) and nasal secretions for changes in amount and color  - [place] appropriate cooling/warming therapies per order  - Administer medications as ordered  - Instruct and encourage patient and family to use good hand hygiene technique  - Identify and instruct in appropriate isolation precautions for identified infection/condition  Outcome: Progressing  Goal: Absence of fever/infection during neutropenic period  Description  INTERVENTIONS:  - Monitor WBC  - Implement neutropenic guidelines  Outcome: Progressing     Problem: SAFETY ADULT  Goal: Patient will remain free of falls  Description  INTERVENTIONS:  - Assess patient frequently for physical needs  -  Identify cognitive and physical deficits and behaviors that affect risk of falls    -  [place] fall precautions as indicated by assessment   - Educate patient/family on patient safety including physical limitations  - Instruct patient to call for assistance with activity based on assessment  - Modify environment to reduce risk of injury  - Consider OT/PT consult to assist with strengthening/mobility  Outcome: Progressing  Goal: Maintain or return to baseline ADL function  Description  INTERVENTIONS:  -  Assess patient's ability to carry out ADLs; assess patient's baseline for ADL function and identify physical deficits which impact ability to perform ADLs (bathing, care of mouth/teeth, toileting, grooming, dressing, etc )  - Assess/evaluate cause of self-care deficits   - Assess range of motion  - Assess patient's mobility; develop plan if impaired  - Assess patient's need for assistive devices and provide as appropriate  - Encourage maximum independence but intervene and supervise when necessary  ¯ Involve family in performance of ADLs  ¯ Assess for home care needs following discharge   ¯ Request OT consult to assist with ADL evaluation and planning for discharge  ¯ Provide patient education as appropriate  Outcome: Progressing  Goal: Maintain or return mobility status to optimal level  Description  INTERVENTIONS:  - Assess patient's baseline mobility status (ambulation, transfers, stairs, etc )    - Identify cognitive and physical deficits and behaviors that affect mobility  - Identify mobility aids required to assist with transfers and/or ambulation (gait belt, sit-to-stand, lift, walker, cane, etc )  - [place] fall precautions as indicated by assessment  - Record patient progress and toleration of activity level on Mobility SBAR; progress patient to next Phase/Stage  - Instruct patient to call for assistance with activity based on assessment  - Request Rehabilitation consult to assist with strengthening/weightbearing, etc   Outcome: Progressing     Problem: DISCHARGE PLANNING  Goal: Discharge to home or other facility with appropriate resources  Description  INTERVENTIONS:  - Identify barriers to discharge w/patient and caregiver  - Arrange for needed discharge resources and transportation as appropriate  - Identify discharge learning needs (meds, wound care, etc )  - Arrange for interpretive services to assist at discharge as needed  - Refer to Case Management Department for coordinating discharge planning if the patient needs post-hospital services based on physician/advanced practitioner order or complex needs related to functional status, cognitive ability, or social support system  Outcome: Progressing     Problem: Knowledge Deficit  Goal: Patient/family/caregiver demonstrates understanding of disease process, treatment plan, medications, and discharge instructions  Description  Complete learning assessment and assess knowledge base    Interventions:  - Provide teaching at level of understanding  - Provide teaching via preferred learning methods  Outcome: Progressing     Problem: METABOLIC, FLUID AND ELECTROLYTES - ADULT  Goal: Electrolytes maintained within normal limits  Description  INTERVENTIONS:  - Monitor labs and assess patient for signs and symptoms of electrolyte imbalances  - Administer electrolyte replacement as ordered  - Monitor response to electrolyte replacements, including repeat lab results as appropriate  - Instruct patient on fluid and nutrition as appropriate  Outcome: Progressing  Goal: Fluid balance maintained  Description  INTERVENTIONS:  - Monitor labs and assess for signs and symptoms of volume excess or deficit  - Monitor I/O and WT  - Instruct patient on fluid and nutrition as appropriate  Outcome: Progressing     Problem: Potential for Falls  Goal: Patient will remain free of falls  Description  INTERVENTIONS:  - Assess patient frequently for physical needs  -  Identify cognitive and physical deficits and behaviors that affect risk of falls  -  [place] fall precautions as indicated by assessment   - Educate patient/family on patient safety including physical limitations  - Instruct patient to call for assistance with activity based on assessment  - Modify environment to reduce risk of injury  - Consider OT/PT consult to assist with strengthening/mobility  Outcome: Progressing     Problem: Prexisting or High Potential for Compromised Skin Integrity  Goal: Skin integrity is maintained or improved  Description  INTERVENTIONS:  - Identify patients at risk for skin breakdown  - Assess and monitor skin integrity  - Assess and monitor nutrition and hydration status  - Monitor labs (i e  albumin)  - Assess for incontinence   - Turn and reposition patient  - Assist with mobility/ambulation  - Relieve pressure over bony prominences  - Avoid friction and shearing  - Provide appropriate hygiene as needed including keeping skin clean and dry  - Evaluate need for skin moisturizer/barrier cream  - Collaborate with interdisciplinary team (i e  Nutrition, Rehabilitation, etc )   - Patient/family teaching  Outcome: Progressing     Problem: Nutrition/Hydration-ADULT  Goal: Nutrient/Hydration intake appropriate for improving, restoring or maintaining nutritional needs  Description  Monitor and assess patient's nutrition/hydration status for malnutrition (ex- brittle hair, bruises, dry skin, pale skin and conjunctiva, muscle wasting, smooth red tongue, and disorientation)  Collaborate with interdisciplinary team and initiate plan and interventions as ordered  Monitor patient's weight and dietary intake as ordered or per policy  Utilize nutrition screening tool and intervene per policy  Determine patient's food preferences and provide high-protein, high-caloric foods as appropriate       INTERVENTIONS:  - Monitor oral intake, urinary output, labs, and treatment plans  - Assess nutrition and hydration status and recommend course of action  - Evaluate amount of meals eaten  - Assist patient with eating if necessary   - Allow adequate time for meals  - Recommend/ encourage appropriate diets, oral nutritional supplements, and vitamin/mineral supplements  - Order, calculate, and assess calorie counts as needed  - Recommend, monitor, and adjust tube feedings and TPN/PPN based on assessed needs  - Assess need for intravenous fluids  - Provide specific nutrition/hydration education as appropriate  - Include patient/family/caregiver in decisions related to nutrition  Outcome: Progressing

## 2019-08-01 NOTE — PLAN OF CARE
Hospice care plan initiated  Problem: Potential for Falls  Goal: Patient will remain free of falls  Description  INTERVENTIONS:  PROM as needed for comfort  - Assess patient frequently for physical needs  - Educate patient/family on patient safety including physical limitations  - Modify environment to reduce risk of injury   Outcome: Progressing     Problem: Prexisting or High Potential for Compromised Skin Integrity  Goal: Skin integrity is maintained or improved  Description  INTERVENTIONS:    - Assess for incontinence   - Turn and reposition patient as needed for comfort  - Relieve pressure over bony prominences  - Avoid friction and shearing  - Provide appropriate hygiene as needed including keeping skin clean and dry  - Evaluate need for skin moisturizer/barrier cream  - Patient/family teaching   Outcome: Progressing     Problem: COPING  Goal: Pt/Family able to verbalize concerns and demonstrate effective coping strategies  Description  INTERVENTIONS:  - Assist patient/family to identify coping skills, available support systems and cultural and spiritual values  - Provide emotional support, including active listening and acknowledgement of concerns of patient and caregivers  - Reduce environmental stimuli, as able  - Provide patient education  - Assess for spiritual pain/suffering and initiate spiritual care, including notification of Pastoral Care or janet based community as needed  - Assess effectiveness of coping strategies  Outcome: Progressing     Problem: DEATH & DYING  Goal: Pt/Family communicate acceptance of impending death and expresses psychological comfort and peace  Description  INTERVENTIONS:  Hospice Care Initiated with Wood Sr     - Assess patient/family anxiety and grief process related to end of life issues  - Provide emotional, spiritual and psychosocial support  - Provide information about the patient's health status with consideration of family and cultural values  - Communicate willingness to discuss death and facilitate grief process  with patient/family as appropriate  - Emphasize sustaining relationships within family system and community, or janet/spiritual traditions  - [address], Pastoral care or other ancillary consults as needed  - Refer to community support groups as appropriate   Outcome: Progressing     Problem: PAIN - ADULT  Goal: Verbalizes/displays adequate comfort level or baseline comfort level  Description  Interventions:    - Assess pain using appropriate pain scale  - Administer analgesics based on type and severity of pain and evaluate response  - Implement non-pharmacological measures as appropriate and evaluate response  - Consider cultural and social influences on pain and pain management  - Notify physician/advanced practitioner if interventions unsuccessful or patient reports new pain   Outcome: Progressing

## 2019-08-01 NOTE — SOCIAL WORK
SW following to assist as needed  Family met with [facility] nurse again today and decision was made to transition to hospice care  Pt was placed on inpatient hospice with [address] Uriah Martinez

## 2019-08-01 NOTE — SOCIAL WORK
Pt was transitioned to [facility] this morning  SW following to support and assist as needed

## 2019-08-01 NOTE — PLAN OF CARE
Problem: Potential for Falls  Goal: Patient will remain free of falls  Description  INTERVENTIONS:  PROM as needed for comfort  - Assess patient frequently for physical needs  - Educate patient/family on patient safety including physical limitations  - Modify environment to reduce risk of injury   8/1/2019 1845 by Juju Juan RN  Outcome: Completed  8/1/2019 1319 by Juju Juan RN  Outcome: Progressing     Problem: Prexisting or High Potential for Compromised Skin Integrity  Goal: Skin integrity is maintained or improved  Description  INTERVENTIONS:    - Assess for incontinence   - Turn and reposition patient as needed for comfort  - Relieve pressure over bony prominences  - Avoid friction and shearing  - Provide appropriate hygiene as needed including keeping skin clean and dry  - Evaluate need for skin moisturizer/barrier cream  - Patient/family teaching   8/1/2019 1845 by Juju Juan RN  Outcome: Completed  8/1/2019 1319 by Juju Juan RN  Outcome: Progressing     Problem: COPING  Goal: Pt/Family able to verbalize concerns and demonstrate effective coping strategies  Description  INTERVENTIONS:  - Assist patient/family to identify coping skills, available support systems and cultural and spiritual values  - Provide emotional support, including active listening and acknowledgement of concerns of patient and caregivers  - Reduce environmental stimuli, as able  - Provide patient education  - Assess for spiritual pain/suffering and initiate spiritual care, including notification of Pastoral Care or janet based community as needed  - Assess effectiveness of coping strategies  8/1/2019 1845 by Juju Juan RN  Outcome: Completed  8/1/2019 1319 by Juju Juan RN  Outcome: Progressing     Problem: DEATH & DYING  Goal: Pt/Family communicate acceptance of impending death and expresses psychological comfort and peace  Description  INTERVENTIONS:  Hospice Care Initiated with Wilbert Leigh     - Assess patient/family anxiety and grief process related to end of life issues  - Provide emotional, spiritual and psychosocial support  - Provide information about the patient's health status with consideration of family and cultural values  - Communicate willingness to discuss death and facilitate grief process  with patient/family as appropriate  - Emphasize sustaining relationships within family system and community, or janet/spiritual traditions  - [address], Pastoral care or other ancillary consults as needed  - Refer to community support groups as appropriate   8/1/2019 1845 by Юлия Burciaga RN  Outcome: Completed  8/1/2019 1319 by Юлия Burciaga RN  Outcome: Progressing     Problem: PAIN - ADULT  Goal: Verbalizes/displays adequate comfort level or baseline comfort level  Description  Interventions:    - Assess pain using appropriate pain scale  - Administer analgesics based on type and severity of pain and evaluate response  - Implement non-pharmacological measures as appropriate and evaluate response  - Consider cultural and social influences on pain and pain management  - Notify physician/advanced practitioner if interventions unsuccessful or patient reports new pain   8/1/2019 1845 by Юлия Burciaga RN  Outcome: Completed  8/1/2019 1319 by Юлия Burciaga RN  Outcome: Progressing     Problem: RESPIRATORY - ADULT  Goal: Achieves optimal ventilation and oxygenation  Description  INTERVENTIONS:  - Assess for changes in respiratory status  - Assess for changes in mentation and behavior  - Position to facilitate oxygenation and minimize respiratory effort  - Oxygen administration by appropriate delivery method based on oxygen saturation (per order) or ABGs  - Initiate smoking cessation education as indicated  - Encourage broncho-pulmonary hygiene including cough, deep breathe, Incentive Spirometry  - Assess the need for suctioning and aspirate as needed  - Assess and instruct to report SOB or any respiratory difficulty  - Respiratory Therapy support as indicated  Outcome: Completed

## 2019-08-01 NOTE — DISCHARGE SUMMARY
Discharge Summary - Padma Rebollar 80 y o  female MRN: [id number]    Unit/Bed#: ICU 05 Encounter: [id number]    Admission Date:   Admission Orders (From admission, onward)     Ordered        07/26/19 1604  Inpatient Admission (expected length of stay for this patient Order details is greater than two midnights)  Once                     Admitting Diagnosis: CHF (congestive heart failure) ([facility] ) [I50 9]  Hypoxia [R09 02]  COPD with acute exacerbation ([facility] ) [J44 1]  Shortness of breath [R06 02]    HPI: "Padma Rebollar is a 80 y o  female who presents with 1 week history of shortness of breath  Past medical history includes GERD, hypothyroidism, depression, and restless leg  Patient called EMS due to worsening shortness of breath and upon arrival, they noted that she was hypoxic with Sp02 in the 70s  In the ER she was placed on BiPAP for tachypnea and increased work of breathing  She received 125mg solumedrol, nebulizers and 20mg of lasix  CXR showed mild vascular congestion  BNP was noted to be 2,500 which was significantly higher than previous admission  She will be admitted to the step down unit for further workup and monitoring  "    Procedures Performed:   Orders Placed This Encounter   Procedures    ED ECG Documentation Only       Summary of Hospital Course: Admitted to ICU  Patient was aggressively diuresed however had minimal improvement in oxygenation  Over the course of several days, ongoing conversations were had with the family and the patient regarding goals of care given minimal improvement  Patient declined thoracentesis and stated she wanted to be comfortable  After further discussion with family and patient, they were agreeable to hospice care      Significant Findings, Care, Treatment and Services Provided:     Complications: none    Discharge Diagnosis: heart failure - hospice    Resolved Problems  Date Reviewed: 7/31/2019          Resolved    Hyponatremia 8/1/2019     Resolved by  Neal Young VINITA Colbert    Urinary tract infection without hematuria 7/30/2019     Resolved by  ARNIE Mayberry          Condition at Discharge: poor         Discharge instructions/Information to patient and family:   See after visit summary for information provided to patient and family  Provisions for Follow-Up Care:  See after visit summary for information related to follow-up care and any pertinent home health orders  PCP: Petr Cooley DO    Disposition: Hospice    Planned Readmission: Yes Hospice    Discharge Statement   I spent 30 minutes discharging the patient  This time was spent on the day of discharge  I had direct contact with the patient on the day of discharge  Additional documentation is required if more than 30 minutes were spent on discharge  Discharge Medications:  See after visit summary for reconciled discharge medications provided to patient and family

## 2019-08-01 NOTE — [place] UNIVERSAL TRANSFER FORM
[place] UNIVERSAL TRANSFER FORM  (ALL ITEMS MUST BE COMPLETED)    1  TRANSFER FROM: [id number]    2  DATE OF TRANSFER: 8/1/2019                        TIME OF TRANSFER: 1130    3  PATIENT NAME: LARRY Thomas      YOB: 1926                             GENDER: female    4  LANGUAGE:   English    5  PHYSICIAN NAME:  Meron Colvin MD                   PHONE: [phone]    6  CODE STATUS: Level 4 - [facility] DNR Attached: No    7  :                                      :  Extended Emergency Contact Information  Primary Emergency Contact: [address] of [address] [facility] Phone: [phone]  Mobile Phone: [phone]  Relation: Daughter  Secondary Emergency Contact: Pau Tello   [facility] of [address] [facility] Phone: [phone]  Mobile Phone: [phone]  Relation: Daughter           Sanket Correa Representative/Proxy:  No           Legal Guardian:  No             NAME OF:           HEALTH CARE REPRESENTATIVE/PROXY:                                         OR           LEGAL GUARDIAN, IF NOT :                                               PHONE:  (Day)           (Night)                        (Cell)    8  REASON FOR TRANSFER: (Must include brief medical history and recent changes in physical function or cognition ) Pt transitioning to inpatient hospice care  V/S: /71 (BP Location: Right arm)   Pulse 81   Temp 97 6 °F (36 4 °C) (Temporal)   Resp (!) 49   Ht 4' 10" (1 473 m)   Wt 67 1 kg (147 lb 14 9 oz)   SpO2 94%   BMI 30 92 kg/m²           PAIN: None    9  PRIMARY DIAGNOSIS: Acute on chronic respiratory failure with hypoxia (HCC)      Secondary Diagnosis:         Pacemaker: No      Internal Defib: No          Mental Health Diagnosis (if Applicable):    10  RESTRAINTS: No     11   RESPIRATORY NEEDS: Oxygen Device NC for comfort,    12  ISOLATION/PRECAUTION: None    13  ALLERGY: Sulfa antibiotics    14  SENSORY:       Vision Good, Hearing Good  and Speech Clear    15  SKIN CONDITION: No Wounds    16  DIET: Regular    17  IV ACCESS: None    18  PERSONAL ITEMS SENT WITH PATIENT: None    19  ATTACHED DOCUMENTS: MUST ATTACH CURRENT MEDICATION INFORMATION Discharge Summary    20  AT RISK ALERTS:Falls, Pressure Ulcer and Aspiration        HARM TO: N/A    21  WEIGHT BEARING STATUS:         Left Leg: Full        Right Leg: Full    22  MENTAL STATUS:Alert and Oriented    23  FUNCTION:        Walk: Not Able        Transfer: Not Able        Toilet: Not Able        Feed: With Help    24  IMMUNIZATIONS/SCREENING:     Immunization History   Administered Date(s) Administered    Influenza TIV (IM) 11/24/2004    Zoster 01/14/2010       25  BOWEL: Incontinent     26  BLADDER: Incontinent    27   SENDING FACILITY CONTACT: Gill Sen RN                  Title: RN        Unit: ICU        Phone: [phone] [address] (if known):        Title:        Unit:         Phone:         FORM PREFILLED BY (if applicable)       Title:       Unit:        Phone:         Amarilis Salmeron RN      Title: RN      Phone: [phone]

## 2024-06-25 NOTE — ASSESSMENT & PLAN NOTE
Known diagnosis  Likely contributing to shortness of breath given its severity Pt fell down basement stairs, approximately 7. No LOC. The stairs are carpeted. Pt has a road rash above her nose between her eyes. Pt seem well appearing in triage. Full ROM. Occurred at 1800. Tylenol @1600. Pt was given tylenol prior to the even, at 1600. Yesterday pt was here and dx with something on her legs and mouth, possible HFM. Refer to chart for more information.    +hematoma to forehead, +abrasion above nose, awake and alert, no cspine tenderness

## 2025-07-22 NOTE — PROGRESS NOTES
Progress Note - Pulmonary   Phyllistine Pounds 80 y o  female MRN: [id number]  Unit/Bed#: -01 Encounter: [id number]    Assessment/Plan:  Acute hypoxic respiratory failure due to asthmatic bronchitis, likely viral in etiology - improved              Titrate oxygen to maintain saturations greater than or equal to 88%              Out of bed as tolerated               Continue flutter valve and incentive spirometer               Continue doxycycline and follow-up sputum culture               Transition to PO prednisone 40mg x 5 days              Continue Xopenex/Atrovent every 6 hours and b i d  Pulmicort   She will need to rinse her mouth after use of this  May benefit from 8 to 12 weeks and inhaler of ICS/LABA but; however, will evaluate closer to discharge  She wants to limit medications needed      Intractable cough due to above              Scheduled Tessalon and continue Mucinex               Continue PPI for GERD as potential aggravating factor      Dysphagia              Speech evaluation noted  Least restrictive diet and aspiration precautions      Outpatient follow-up pending hospital course   Discussed with Internal Medicine and patient's family at bedside  Hunter Tijerina remains a Level 2 DNAR/DNI  Her son-in-law has her living well and will bring it in  Chief Complaint:   Bronchospasm/bronchitis    Subjective:   No acute events overnight  The patient generally feels better  Reports her breathing is improved  Daughter says patient looks much more comfortable  Objective:     Vitals: Blood pressure 110/62, pulse 73, temperature 98 °F (36 7 °C), temperature source Oral, resp  rate 18, height 4' 9" (1 448 m), weight 65 4 kg (144 lb 2 9 oz), SpO2 98 %  ,Body mass index is 31 2 kg/m²        Intake/Output Summary (Last 24 hours) at 05/05/18 0841  Last data filed at 05/04/18 1401   Gross per 24 hour   Intake              880 ml   Output                0 ml   Net              880 ml We can write a letter for her  Due to the conditions of CRPS, RSD as well as fibromyalgia and arthritis and anxiety   Invasive Devices     Peripheral Intravenous Line            Peripheral IV 05/04/18 Left Forearm 1 day                Physical Exam:  -  Awake/alert  -  Sitting in myron  -  Mild diffuse rhonchi  -  RRR  -  No LE edema  -  Soft nt/nd  -  Warm well perfused    Labs: I have personally reviewed pertinent lab results       Imaging and other studies: I have personally reviewed pertinent films in PACS  CXR 4/30/2018:  Low lung volumes; prominent dextroscoliosis